# Patient Record
Sex: FEMALE | Race: WHITE | NOT HISPANIC OR LATINO | Employment: FULL TIME | URBAN - METROPOLITAN AREA
[De-identification: names, ages, dates, MRNs, and addresses within clinical notes are randomized per-mention and may not be internally consistent; named-entity substitution may affect disease eponyms.]

---

## 2023-08-29 ENCOUNTER — HOSPITAL ENCOUNTER (OUTPATIENT)
Dept: DATA CONVERSION | Facility: HOSPITAL | Age: 58
Discharge: HOME | End: 2023-08-29
Payer: MEDICARE

## 2023-08-29 DIAGNOSIS — B34.9 VIRAL INFECTION, UNSPECIFIED: ICD-10-CM

## 2023-08-29 DIAGNOSIS — G93.31 POSTVIRAL FATIGUE SYNDROME: ICD-10-CM

## 2023-08-29 LAB
ALBUMIN SERPL-MCNC: 3.7 GM/DL (ref 3.5–5)
ALBUMIN/GLOB SERPL: 1.2 RATIO (ref 1.5–3)
ALP BLD-CCNC: 97 U/L (ref 35–125)
ALT SERPL-CCNC: 28 U/L (ref 5–40)
ANION GAP SERPL CALCULATED.3IONS-SCNC: 13 MMOL/L (ref 0–19)
AST SERPL-CCNC: 31 U/L (ref 5–40)
BASOPHILS # BLD AUTO: 0.02 K/UL (ref 0–0.22)
BASOPHILS NFR BLD AUTO: 0.3 % (ref 0–1)
BILIRUB SERPL-MCNC: 0.2 MG/DL (ref 0.1–1.2)
BUN SERPL-MCNC: 24 MG/DL (ref 8–25)
BUN/CREAT SERPL: 24 RATIO (ref 8–21)
CALCIUM SERPL-MCNC: 9.5 MG/DL (ref 8.5–10.4)
CHLORIDE SERPL-SCNC: 105 MMOL/L (ref 97–107)
CO2 SERPL-SCNC: 25 MMOL/L (ref 24–31)
CREAT SERPL-MCNC: 1 MG/DL (ref 0.4–1.6)
DEPRECATED RDW RBC AUTO: 47.5 FL (ref 37–54)
DIFFERENTIAL METHOD BLD: ABNORMAL
EOSINOPHIL # BLD AUTO: 0.03 K/UL (ref 0–0.45)
EOSINOPHIL NFR BLD: 0.5 % (ref 0–3)
ERYTHROCYTE [DISTWIDTH] IN BLOOD BY AUTOMATED COUNT: 13.4 % (ref 11.7–15)
GFR SERPL CREATININE-BSD FRML MDRD: 66 ML/MIN/1.73 M2
GLOBULIN SER-MCNC: 3.1 G/DL (ref 1.9–3.7)
GLUCOSE SERPL-MCNC: 115 MG/DL (ref 65–99)
HCT VFR BLD AUTO: 38.8 % (ref 36–44)
HGB BLD-MCNC: 12.8 GM/DL (ref 12–15)
IMM GRANULOCYTES # BLD AUTO: 0.02 K/UL (ref 0–0.1)
LYMPHOCYTES # BLD AUTO: 0.93 K/UL (ref 1.2–3.2)
LYMPHOCYTES NFR BLD MANUAL: 14.8 % (ref 20–40)
MCH RBC QN AUTO: 31.5 PG (ref 26–34)
MCHC RBC AUTO-ENTMCNC: 33 % (ref 31–37)
MCV RBC AUTO: 95.6 FL (ref 80–100)
MONOCYTES # BLD AUTO: 0.67 K/UL (ref 0–0.8)
MONOCYTES NFR BLD MANUAL: 10.7 % (ref 0–8)
NEUTROPHILS # BLD AUTO: 4.62 K/UL
NEUTROPHILS # BLD AUTO: 4.62 K/UL (ref 1.8–7.7)
NEUTROPHILS.IMMATURE NFR BLD: 0.3 % (ref 0–1)
NEUTS SEG NFR BLD: 73.4 % (ref 50–70)
NRBC BLD-RTO: 0 /100 WBC
PLATELET # BLD AUTO: 262 K/UL (ref 150–450)
PMV BLD AUTO: 10 CU (ref 7–12.6)
POTASSIUM SERPL-SCNC: 4.6 MMOL/L (ref 3.4–5.1)
PROT SERPL-MCNC: 6.8 G/DL (ref 5.9–7.9)
RBC # BLD AUTO: 4.06 M/UL (ref 4–4.9)
SODIUM SERPL-SCNC: 143 MMOL/L (ref 133–145)
TSH SERPL DL<=0.05 MIU/L-ACNC: 0.89 MIU/L (ref 0.27–4.2)
WBC # BLD AUTO: 6.3 K/UL (ref 4.5–11)

## 2023-09-13 ENCOUNTER — HOSPITAL ENCOUNTER (OUTPATIENT)
Dept: DATA CONVERSION | Facility: HOSPITAL | Age: 58
Discharge: HOME | End: 2023-09-13
Payer: MEDICARE

## 2023-09-13 DIAGNOSIS — M70.62 TROCHANTERIC BURSITIS, LEFT HIP: ICD-10-CM

## 2023-10-09 DIAGNOSIS — G89.29 OTHER CHRONIC PAIN: Primary | ICD-10-CM

## 2023-10-09 RX ORDER — LEVOCETIRIZINE DIHYDROCHLORIDE 5 MG/1
TABLET, FILM COATED ORAL EVERY 24 HOURS
COMMUNITY

## 2023-10-09 RX ORDER — OXYCODONE AND ACETAMINOPHEN 5; 325 MG/1; MG/1
TABLET ORAL
COMMUNITY
Start: 2023-01-10 | End: 2024-04-29 | Stop reason: ALTCHOICE

## 2023-10-09 RX ORDER — BUPROPION HYDROCHLORIDE 150 MG/1
TABLET ORAL
COMMUNITY
End: 2024-04-16

## 2023-10-09 RX ORDER — AMOXICILLIN AND CLAVULANATE POTASSIUM 875; 125 MG/1; MG/1
1 TABLET, FILM COATED ORAL 2 TIMES DAILY
COMMUNITY
Start: 2022-11-25 | End: 2022-12-05

## 2023-10-09 RX ORDER — TRIAMCINOLONE ACETONIDE 0.25 MG/G
CREAM TOPICAL
COMMUNITY
Start: 2023-06-07 | End: 2024-04-29

## 2023-10-09 RX ORDER — BUPROPION HYDROCHLORIDE 300 MG/1
TABLET ORAL
COMMUNITY
End: 2024-04-16

## 2023-10-09 RX ORDER — ACETAMINOPHEN 325 MG/1
TABLET ORAL EVERY 4 HOURS
COMMUNITY

## 2023-10-09 RX ORDER — DOXYCYCLINE 100 MG/1
100 CAPSULE ORAL 2 TIMES DAILY
COMMUNITY
Start: 2023-01-10 | End: 2023-01-17

## 2023-10-09 RX ORDER — DULOXETIN HYDROCHLORIDE 60 MG/1
60 CAPSULE, DELAYED RELEASE ORAL DAILY
COMMUNITY
End: 2024-04-16

## 2023-10-09 RX ORDER — MOMETASONE FUROATE AND FORMOTEROL FUMARATE DIHYDRATE 100; 5 UG/1; UG/1
AEROSOL RESPIRATORY (INHALATION) EVERY 12 HOURS
COMMUNITY
End: 2024-04-29 | Stop reason: ALTCHOICE

## 2023-10-09 RX ORDER — MECLIZINE HCL 12.5 MG 12.5 MG/1
TABLET ORAL
COMMUNITY
End: 2024-04-29 | Stop reason: ALTCHOICE

## 2023-10-09 RX ORDER — ACETAMINOPHEN, ASPIRIN (NSAID), AND CAFFEINE 250; 250; 65 MG/1; MG/1; MG/1
TABLET, FILM COATED ORAL EVERY 6 HOURS
COMMUNITY

## 2023-10-09 RX ORDER — CELECOXIB 200 MG/1
CAPSULE ORAL
COMMUNITY
End: 2024-01-02 | Stop reason: SDUPTHER

## 2023-10-09 RX ORDER — SULFAMETHOXAZOLE AND TRIMETHOPRIM 800; 160 MG/1; MG/1
TABLET ORAL
COMMUNITY
End: 2024-04-29 | Stop reason: ALTCHOICE

## 2023-10-09 RX ORDER — ELECTROLYTES/DEXTROSE
SOLUTION, ORAL ORAL EVERY 24 HOURS
COMMUNITY

## 2023-10-09 RX ORDER — FLUTICASONE PROPIONATE 50 MCG
SPRAY, SUSPENSION (ML) NASAL
COMMUNITY
Start: 2022-11-25 | End: 2024-04-29 | Stop reason: ALTCHOICE

## 2023-10-09 RX ORDER — ALBUTEROL SULFATE 90 UG/1
AEROSOL, METERED RESPIRATORY (INHALATION)
COMMUNITY
Start: 2013-07-23

## 2023-10-09 RX ORDER — BUDESONIDE AND FORMOTEROL FUMARATE DIHYDRATE 160; 4.5 UG/1; UG/1
2 AEROSOL RESPIRATORY (INHALATION) 2 TIMES DAILY
COMMUNITY
Start: 2013-10-14 | End: 2024-04-29 | Stop reason: ALTCHOICE

## 2023-10-09 RX ORDER — CELECOXIB 200 MG/1
200 CAPSULE ORAL DAILY
Qty: 30 CAPSULE | Refills: 0 | OUTPATIENT
Start: 2023-10-09

## 2023-10-09 RX ORDER — ONDANSETRON 4 MG/1
TABLET, FILM COATED ORAL
COMMUNITY
Start: 2023-08-30 | End: 2024-04-29 | Stop reason: ALTCHOICE

## 2023-10-09 RX ORDER — PREDNISONE 10 MG/1
TABLET ORAL
COMMUNITY
End: 2024-04-29 | Stop reason: ALTCHOICE

## 2023-10-10 RX ORDER — CELECOXIB 200 MG/1
200 CAPSULE ORAL DAILY
Qty: 30 CAPSULE | Refills: 2 | Status: SHIPPED | OUTPATIENT
Start: 2023-10-10 | End: 2023-11-09

## 2023-11-02 PROBLEM — E21.3 HYPERPARATHYROIDISM (MULTI): Status: ACTIVE | Noted: 2023-11-02

## 2023-11-02 PROBLEM — N39.490 OVERFLOW INCONTINENCE: Status: ACTIVE | Noted: 2023-11-02

## 2023-11-02 PROBLEM — G54.2 CERVICAL NERVE ROOT IMPINGEMENT: Status: ACTIVE | Noted: 2023-11-02

## 2023-11-02 PROBLEM — G47.33 OBSTRUCTIVE SLEEP APNEA SYNDROME: Status: ACTIVE | Noted: 2023-11-02

## 2023-11-02 PROBLEM — K90.9 INTESTINAL MALABSORPTION (HHS-HCC): Status: ACTIVE | Noted: 2023-11-02

## 2023-11-02 PROBLEM — J45.20 MILD INTERMITTENT ASTHMA WITHOUT COMPLICATION (HHS-HCC): Status: ACTIVE | Noted: 2023-11-02

## 2023-11-02 PROBLEM — E55.9 VITAMIN D DEFICIENCY: Status: ACTIVE | Noted: 2023-11-02

## 2023-11-02 PROBLEM — G43.109 OCULAR MIGRAINE: Status: ACTIVE | Noted: 2023-11-02

## 2023-11-02 PROBLEM — M19.012 ARTHRITIS OF LEFT ACROMIOCLAVICULAR JOINT: Status: ACTIVE | Noted: 2023-11-02

## 2023-11-02 PROBLEM — G89.29 OTHER CHRONIC PAIN: Status: ACTIVE | Noted: 2023-11-02

## 2023-11-02 PROBLEM — F41.8 DEPRESSION WITH ANXIETY: Status: ACTIVE | Noted: 2023-11-02

## 2023-11-02 PROBLEM — G44.89 OTHER HEADACHE SYNDROME: Status: ACTIVE | Noted: 2023-11-02

## 2023-11-02 PROBLEM — M50.30 DDD (DEGENERATIVE DISC DISEASE), CERVICAL: Status: ACTIVE | Noted: 2023-11-02

## 2023-11-02 PROBLEM — J45.30 MILD PERSISTENT ASTHMA WITHOUT COMPLICATION (HHS-HCC): Status: ACTIVE | Noted: 2023-11-02

## 2023-11-02 PROBLEM — F43.21 GRIEF: Status: ACTIVE | Noted: 2023-11-02

## 2023-11-02 PROBLEM — N39.3 STRESS INCONTINENCE (FEMALE) (MALE): Status: ACTIVE | Noted: 2023-11-02

## 2023-11-02 PROBLEM — J45.909 ASTHMA (HHS-HCC): Status: ACTIVE | Noted: 2023-11-02

## 2023-11-02 PROBLEM — E66.01 MORBID (SEVERE) OBESITY DUE TO EXCESS CALORIES (MULTI): Status: ACTIVE | Noted: 2023-11-02

## 2023-11-02 PROBLEM — M47.812 SPONDYLOSIS WITHOUT MYELOPATHY OR RADICULOPATHY, CERVICAL REGION: Status: ACTIVE | Noted: 2023-11-02

## 2023-11-02 PROBLEM — J31.0 CHRONIC RHINITIS: Status: ACTIVE | Noted: 2023-11-02

## 2023-11-02 PROBLEM — H93.13 TINNITUS OF BOTH EARS: Status: ACTIVE | Noted: 2023-11-02

## 2023-11-02 PROBLEM — M19.011 PRIMARY OSTEOARTHRITIS, RIGHT SHOULDER: Status: ACTIVE | Noted: 2023-11-02

## 2023-11-02 RX ORDER — CALCIUM CITRATE/VITAMIN D3 500MG-12.5
1 TABLET,CHEWABLE ORAL 2 TIMES DAILY
COMMUNITY

## 2023-11-02 RX ORDER — PNV NO.95/FERROUS FUM/FOLIC AC 28MG-0.8MG
1 TABLET ORAL DAILY
COMMUNITY

## 2023-11-02 RX ORDER — ASCORBIC ACID 125 MG
1 TABLET,CHEWABLE ORAL DAILY
COMMUNITY
End: 2024-04-29 | Stop reason: ALTCHOICE

## 2023-11-02 RX ORDER — ONDANSETRON 4 MG/1
1 TABLET, FILM COATED ORAL EVERY 4 HOURS PRN
COMMUNITY
Start: 2023-08-30 | End: 2024-04-29 | Stop reason: ALTCHOICE

## 2023-11-06 ENCOUNTER — ANCILLARY PROCEDURE (OUTPATIENT)
Dept: RADIOLOGY | Facility: CLINIC | Age: 58
End: 2023-11-06
Payer: MEDICARE

## 2023-11-06 ENCOUNTER — OFFICE VISIT (OUTPATIENT)
Dept: ORTHOPEDIC SURGERY | Facility: CLINIC | Age: 58
End: 2023-11-06
Payer: MEDICARE

## 2023-11-06 DIAGNOSIS — M25.512 LEFT SHOULDER PAIN, UNSPECIFIED CHRONICITY: ICD-10-CM

## 2023-11-06 DIAGNOSIS — M25.812 IMPINGEMENT OF LEFT SHOULDER: Primary | ICD-10-CM

## 2023-11-06 PROCEDURE — 73030 X-RAY EXAM OF SHOULDER: CPT | Mod: LT,FY

## 2023-11-06 PROCEDURE — 99213 OFFICE O/P EST LOW 20 MIN: CPT | Performed by: ORTHOPAEDIC SURGERY

## 2023-11-06 PROCEDURE — 20611 DRAIN/INJ JOINT/BURSA W/US: CPT | Performed by: ORTHOPAEDIC SURGERY

## 2023-11-06 PROCEDURE — 1036F TOBACCO NON-USER: CPT | Performed by: ORTHOPAEDIC SURGERY

## 2023-11-06 RX ORDER — TRIAMCINOLONE ACET/0.9%NACL/PF 40 MG/ML
40 VIAL (ML) INJECTION ONCE
Status: COMPLETED | OUTPATIENT
Start: 2023-11-06 | End: 2023-11-06

## 2023-11-06 RX ADMIN — Medication 40 MG: at 11:16

## 2023-11-06 ASSESSMENT — ENCOUNTER SYMPTOMS
FEVER: 0
ARTHRALGIAS: 1
SHORTNESS OF BREATH: 0
WHEEZING: 0
FATIGUE: 0
CHILLS: 0

## 2023-11-06 ASSESSMENT — PAIN SCALES - GENERAL: PAINLEVEL_OUTOF10: 7

## 2023-11-06 ASSESSMENT — PAIN - FUNCTIONAL ASSESSMENT: PAIN_FUNCTIONAL_ASSESSMENT: 0-10

## 2023-11-06 NOTE — PROGRESS NOTES
Reason for Appointment  Chief Complaint   Patient presents with    Left Shoulder - Follow-up       History of Present Illness  Patient is a 58 y.o. female here today for evaluation of new left shoulder pain. She is about 2 months s/p a right subacromial injection which has provided excellent relief. X-rays today do not show any severe DJD of the shoulder joint, there is some mild AC joint DJD. She complains of similar symptoms in the left shoulder that are worse with lifting and better with rest. The pain is anterior and anterior lateral. She has continued to work as a .     History reviewed. No pertinent past medical history.    History reviewed. No pertinent surgical history.    Medication Documentation Review Audit       Reviewed by Deborah Stein MA (Medical Assistant) on 11/06/23 at 0807      Medication Order Taking? Sig Documenting Provider Last Dose Status   acetaminophen (TylenoL) 325 mg tablet [id number]  every 4 hours. Historical Provider, MD  Active   albuterol (Ventolin HFA) 90 mcg/actuation inhaler [id number]  Inhale. Historical Provider, MD  Active   aspirin-acetaminophen-caffeine (Excedrin Extra Strength) 250-250-65 mg tablet [id number]  every 6 hours. Historical Provider, MD  Active   biotin 5 mg capsule [id number]  once every 24 hours. Historical Provider, MD  Active   budesonide-formoteroL (Symbicort) 160-4.5 mcg/actuation inhaler [id number]  Inhale 2 puffs 2 times a day. Historical Provider, MD  Active   buPROPion XL (Wellbutrin XL) 150 mg 24 hr tablet [id number]  TAKE ONE TABLET BY MOUTH DAILY IN THE MORNING ONCE A DAY WITH A 300 MG TABLET Historical Provider, MD  Active   buPROPion XL (Wellbutrin XL) 300 mg 24 hr tablet [id number]  TAKE ONE TABLET BY MOUTH DAILY IN THE MORNING WITH  MG DOSE Historical Provider, MD  Active   calcium citrate-vitamin D3 500 mg-12.5 mcg (500 unit) tablet,chewable [id number]  Chew 1 tablet 2 times a day. Historical Provider, MD  Active   celecoxib  (CeleBREX) 200 mg capsule [id number]  take one capsule by mouth once daily with food for 30 Historical Provider, MD  Active   celecoxib (CeleBREX) 200 mg capsule [id number]  Take 1 capsule (200 mg) by mouth once daily. Joel Jones MD  Active   cholecalciferol, vitamin D3, 25 mcg (1,000 unit) tablet,chewable [id number]  Chew 1 tablet (25 mcg) once daily. Historical Provider, MD  Active   cyanocobalamin (Vitamin B-12) 100 mcg tablet [id number]  Take 1 tablet (100 mcg) by mouth once daily. Historical Provider, MD  Active   Dulera 100-5 mcg/actuation inhaler [id number]  every 12 hours. Historical Provider, MD  Active   DULoxetine (Cymbalta) 60 mg DR capsule [id number]  Take 1 capsule (60 mg) by mouth once daily. Historical Provider, MD  Active   fluticasone (Flonase) 50 mcg/actuation nasal spray [id number]  INSTILL 1 SPRAY INTO EACH NOSTRIL DAILY FOR 14 DAYS Historical Provider, MD  Active   levocetirizine (Xyzal) 5 mg tablet [id number]  once every 24 hours. Historical Provider, MD  Active   meclizine (Antivert) 12.5 mg tablet [id number]  TAKE 1 TO 2 TABLETS BY MOUTH 3 TIMES A DAY for 10 Historical Provider, MD  Active   multivit-minerals/folic acid (ADULT ONE DAILY MULTIVITAMIN ORAL) [id number]  Take by mouth. As directed Historical Provider, MD  Active   ondansetron (Zofran) 4 mg tablet [id number]  TAKE ONE TABLET BY MOUTH EVERY 4 HOURS AS NEEDED FOR NAUSEA/VOMITING/HEADACHE Historical Provider, MD  Active   ondansetron (Zofran) 4 mg tablet [id number]  Take 1 tablet (4 mg) by mouth every 4 hours if needed.  N/V/HA for 30 days Historical Provider, MD  Active   oxyCODONE-acetaminophen (Percocet) 5-325 mg tablet [id number]  TAKE ONE TABLET BY MOUTH EVERY 6 HOURS AS NEEDED FOR PAIN FOR 7 DAYS Historical Provider, MD  Active   predniSONE (Deltasone) 10 mg tablet [id number]  TAKE 4 TABLETS (40 MG) BY MOUTH DAILY FOR 4 DAYS  THEN TAKE 2 TABLETS (20 MG) DAILY FOR 4 DAYS  THEN TAKE 1 TABLET (10 MG) DAILY FOR 4 DAYS. Historical  Provider, MD  Active   sulfamethoxazole-trimethoprim (Bactrim DS) 800-160 mg tablet [id number]  TAKE ONE TABLET BY MOUTH TWO TIMES A DAY FOR 3 DAYS Historical Provider, MD  Active   triamcinolone (Kenalog) 0.025 % cream [id number]  APPLY TOPICALLY 2-4 TIMES DAILY. DO NOT APPLY TO FACE/INTERTRIGINOUS (ARMPIT OR GROIN) AREAS. Historical Provider, MD  Active                    Allergies   Allergen Reactions    House Dust Mite Other     congested, stuffy    Mold Other     congested, stuffy       Review of Systems   Constitutional:  Negative for chills, fatigue and fever.   Respiratory:  Negative for shortness of breath and wheezing.    Cardiovascular:  Negative for chest pain and leg swelling.   Musculoskeletal:  Positive for arthralgias.   All other systems reviewed and are negative.      Exam   On exam, the left shoulder shows good ROM and excellent cuff strength, markedly positive impingement sign, no significant biceps tenderness, good pulses, good sensation, good deltoid function    Assessment   Encounter Diagnosis   Name Primary?    Left shoulder pain, unspecified chronicity        Plan   The injection to the right shoulder provided excellent relief. Today we discussed conservative treatment. At this point, the patient is experiencing increased left shoulder pain that is consistent with impingement syndrome on clinical examination with positive impingement signs. We will do one cortisone injection into the left subacromial space in hopes to calm their symptoms nicely. Pt understands the small risk of infection and warning signs including flare reaction.     Procedures  After discussing the risks and benefits of the procedure with proceeded with an injection.  Using ultrasound guidance we identified the acromion, humeral head and the subacromial bursa, images obtained. We then sterilely injected the left subacrominal space with a mixture of 40 mg of Kenalog and 1 cc of 1 % lidocaine. Pt tolerated the procedure well  without any adverse reactions.    I, Sita Muniz, attest that this documentation has been prepared under the direction and in the presence of Armani Vegas MD. By signing below, I, Armani Vegas MD, personally performed the services described in this documentation. All medical record entries made by the scribe were at my direction and in my presence. I have reviewed the chart and agree that the record reflects my personal performance and is accurate and complete. 11/06/23

## 2023-12-05 ENCOUNTER — TELEPHONE (OUTPATIENT)
Dept: PAIN MEDICINE | Facility: CLINIC | Age: 58
End: 2023-12-05
Payer: MEDICARE

## 2023-12-05 NOTE — TELEPHONE ENCOUNTER
Pt lm, asking for celebrex refill and another procedure. I didn't see any recent appts for her. I called her back to schedule a follow up/ ended up leaving her a message stating she needs a follow up appt to discuss med refills and procedure. kb

## 2024-01-01 NOTE — PROGRESS NOTES
[facility]  Follow Up Office Visit Note 2024    Patient Information: Arin Phan, MRN: [id number], : 1965   Primary Care/Referring Physician: Alejandrina Hodge MD, [address] [facility] [address] / [facility] [address]     Chief Complaint: Neck and shoulder pain  History of Pain: Ms. Arin Phan is a 58 y.o. female with a PMHx of asthma, GAYE, anxiety, depression who presents for left sided neck and shoulder pain.    She describes primarily axial neck pain, worse on the left, which worsens when extending her head, rotating her head to the left.  This pain radiates down to her left shoulder but not further down the arm.  She does describe intermittent tingling in her hands.  She has undergone a C6/7 MARK with Dr. Jones in the past with over 60% pain relief for 3-4 months. She has also been utilizing Celecoxib daily and ran out a few months ago with significant worsening of her pain.     She also describes pain in other areas of her body, including her left shoulder, left hip, and bilateral knees. Was last seen by Dr. Vegas on 23 who performed a left subacromial bursa injection. She is also seeing Dr. Prescott for hip and knee pain (has had injections in both knees and her left hip). Of note, she is planning to undergo surgery on her right foot on 24.     Current Pain Medications: OTC Tylenol, Celecoxib 200 mg daily, Duloxetine 60 mg daily,   Previously Tried Pain Medications:    Relevant Surgeries: Denies spine or major orthopedic surgery.   Injections: Underwent bilateral subacromial bursa injections , left hip injection, bilateral knee injections, C6/7 MARK  Physical/Occupational Therapy: Has done PT for this pain in the past    Medications:   Current Outpatient Medications   Medication Instructions    acetaminophen (TylenoL) 325 mg tablet Every 4 hours    albuterol (Ventolin HFA) 90 mcg/actuation inhaler inhalation    aspirin-acetaminophen-caffeine (Excedrin Extra Strength) 250-250-65 mg  tablet Every 6 hours    biotin 5 mg capsule Every 24 hours    budesonide-formoteroL (Symbicort) 160-4.5 mcg/actuation inhaler 2 puffs, inhalation, 2 times daily    buPROPion XL (Wellbutrin XL) 150 mg 24 hr tablet TAKE ONE TABLET BY MOUTH DAILY IN THE MORNING ONCE A DAY WITH A 300 MG TABLET    buPROPion XL (Wellbutrin XL) 300 mg 24 hr tablet TAKE ONE TABLET BY MOUTH DAILY IN THE MORNING WITH  MG DOSE    calcium citrate-vitamin D3 500 mg-12.5 mcg (500 unit) tablet,chewable 1 tablet, oral, 2 times daily    celecoxib (CELEBREX) 200 mg, oral, Daily    cholecalciferol, vitamin D3, 25 mcg (1,000 unit) tablet,chewable 1 tablet, oral, Daily    cyanocobalamin (Vitamin B-12) 100 mcg tablet 1 tablet, oral, Daily    Dulera 100-5 mcg/actuation inhaler Every 12 hours    DULoxetine (CYMBALTA) 60 mg, oral, Daily    fluticasone (Flonase) 50 mcg/actuation nasal spray INSTILL 1 SPRAY INTO EACH NOSTRIL DAILY FOR 14 DAYS    levocetirizine (Xyzal) 5 mg tablet Every 24 hours    meclizine (Antivert) 12.5 mg tablet TAKE 1 TO 2 TABLETS BY MOUTH 3 TIMES A DAY for 10    multivit-minerals/folic acid (ADULT ONE DAILY MULTIVITAMIN ORAL) oral, As directed    ondansetron (Zofran) 4 mg tablet TAKE ONE TABLET BY MOUTH EVERY 4 HOURS AS NEEDED FOR NAUSEA/VOMITING/HEADACHE    ondansetron (Zofran) 4 mg tablet 1 tablet, oral, Every 4 hours PRN,  N/V/HA for 30 days<BR>    oxyCODONE-acetaminophen (Percocet) 5-325 mg tablet TAKE ONE TABLET BY MOUTH EVERY 6 HOURS AS NEEDED FOR PAIN FOR 7 DAYS    predniSONE (Deltasone) 10 mg tablet TAKE 4 TABLETS (40 MG) BY MOUTH DAILY FOR 4 DAYS  THEN TAKE 2 TABLETS (20 MG) DAILY FOR 4 DAYS  THEN TAKE 1 TABLET (10 MG) DAILY FOR 4 DAYS.    sulfamethoxazole-trimethoprim (Bactrim DS) 800-160 mg tablet TAKE ONE TABLET BY MOUTH TWO TIMES A DAY FOR 3 DAYS    triamcinolone (Kenalog) 0.025 % cream APPLY TOPICALLY 2-4 TIMES DAILY. DO NOT APPLY TO FACE/INTERTRIGINOUS (ARMPIT OR GROIN) AREAS.      Allergies:   Allergies   Allergen  Reactions    House Dust Mite Other     congested, stuffy    Mold Other     congested, stuffy       Past Medical & Surgical History:  History reviewed. No pertinent past medical history. History reviewed. No pertinent surgical history.    Family History   Problem Relation Name Age of Onset    Heart disease Mother 81     Coronary artery disease Mother 81     Other (s/p cabg) Mother 81     Hypertension Father 81     Obesity Father 81     Atrial fibrillation Father 81     Heart failure Father 81     Other (CRF) Father 81     Other (CVA) Brother 59      Social History     Socioeconomic History    Marital status: Single     Spouse name: Not on file    Number of children: Not on file    Years of education: Not on file    Highest education level: Not on file   Occupational History    Not on file   Tobacco Use    Smoking status: Never    Smokeless tobacco: Never   Substance and Sexual Activity    Alcohol use: Never    Drug use: Never    Sexual activity: Not on file   Other Topics Concern    Not on file   Social History Narrative    Not on file     Social Determinants of Health     Financial Resource Strain: Not on file   Food Insecurity: Not on file   Transportation Needs: Not on file   Physical Activity: Not on file   Stress: Not on file   Social Connections: Not on file   Intimate Partner Violence: Not on file   Housing Stability: Not on file       Problems, Past medical history, past surgical history, Medications, allergies, social and family history reviewed and as per the electronic medical record from today's encounter    Review of Systems:  CONST: No fever, chills, fatigue, weight changes  EYES: No loss of vision  ENT: No hearing loss, tinnitus  CV: No chest pain, palpitations  RESP: No dyspnea, shortness of breath, cough  GI: No stool incontinence, nausea, vomiting  : No urinary incontinence  MSK: No joint swelling  SKIN: No rash, no hives  NEURO: No headache, dizziness, weakness  PSYCH: Hx of anxiety and  "depression  HEM/LYMPH: No easy bruising or bleeding  All other systems reviewed are negative     Physical Exam:  Vitals: /80   Pulse 81   Resp 18   Ht 1.626 m (5' 4\")   Wt 109 kg (240 lb)   BMI 41.20 kg/m²   General: No apparent distress. Alert, appropriate, oriented x 3. Mood and affect normal. Speaking in full sentences.  HENT: Normocephalic, atraumatic. Hearing intact.  Eyes: Extraocular movements grossly intact. Pupils equal and round.   Neck: Supple, trachea midline.  Lungs: Symmetric respiratory excursion on visual exam, nonlabored breathing.  Extremities: No clubbing, cyanosis, or edema noted in arms or legs.  Skin: No rashes, lesions, alopecia noted on back or extremities.   Neck: Reports pain with extension, leftward rotation. Spurling's maneuver causes axial pain bilaterally but no radiation of pain down to her hands. Reports tenderness to palpation of her bilateral cervical paraspinal muscles, bilateral trapezius muscles.  Neuro: Alert and appropriate. Gait within normal limits. Bulk and tone within normal limits.    Laboratory Data:  The following laboratory data were reviewed during this visit:   Lab Results   Component Value Date    WBC 6.3 08/29/2023    RBC 4.06 08/29/2023    HGB 12.8 08/29/2023    HCT 38.8 08/29/2023     08/29/2023      No results found for: \"INR\"  Lab Results   Component Value Date    CREATININE 1.0 08/29/2023    HGBA1C 5.7 03/28/2018       Imaging:  The following imaging impressions were reviewed by me during this visit:    -1/17/22 cervical spine MRI shows C5-6 circumferential disc bulge at this level, mild facet arthrosis and uncovertebral joint spurring bilaterally resulting in bilateral foraminal stenosis, more severe on the right than on the left.  -11/6/23 left shoulder xray shows mild degenerative narrowing and spurring of the acromioclavicular joint space. The glenohumeral joint space overall appears maintained.      I also personally reviewed the images " from the above studies myself. These images and my interpretation of them contributed to the management and decision making of the patient's medical plan.    ASSESSMENT:  Ms. Arin Phan is a 58 y.o. female with left neck and shoulder pain that is consistent with:    1. Cervical radiculopathy    2. Impingement of left shoulder    3. Arthropathy of cervical facet joint        PLAN:    Diagnostics:   -I reviewed her most recent cervical spine MRI (see above for details).  No new diagnostics at this time    Physical Therapy and Rehabilitation:     -Completed physical therapy for her pain in the past, without durable improvement.  She should maintain her current home exercise program    Psychologically:  -No needs at this time    Medications  -She reports significant improvement in pain from celecoxib 200 mg daily.  Will continue this medication. Refills provided today    Duration  - Multiple years    Interventions:  -I suspect her neck and shoulder pain is multifactorial in the setting of cervical radiculopathy, cervical facet arthropathy, and left shoulder impingement.  She has previously undergone a C6/7 MARK with 60% pain relief for 3 to 4 months.  Her neck pain is back to baseline and she is interested in repeating this procedure utilizing live fluoroscopy and local anesthesia.  I would also consider to a left-sided C4/5, C5/6 mbb/RF ablation in the future as a means of providing pain relief while avoiding additional steroid use since she is getting steroid injections from multiple different providers.        Sincerely,  Efrem Lyn MD  [facility] [place]

## 2024-01-02 ENCOUNTER — OFFICE VISIT (OUTPATIENT)
Dept: PAIN MEDICINE | Facility: CLINIC | Age: 59
End: 2024-01-02
Payer: MEDICARE

## 2024-01-02 ENCOUNTER — APPOINTMENT (OUTPATIENT)
Dept: PAIN MEDICINE | Facility: CLINIC | Age: 59
End: 2024-01-02
Payer: MEDICARE

## 2024-01-02 VITALS
DIASTOLIC BLOOD PRESSURE: 80 MMHG | WEIGHT: 240 LBS | SYSTOLIC BLOOD PRESSURE: 135 MMHG | HEART RATE: 81 BPM | HEIGHT: 64 IN | BODY MASS INDEX: 40.97 KG/M2 | RESPIRATION RATE: 18 BRPM

## 2024-01-02 DIAGNOSIS — M47.812 ARTHROPATHY OF CERVICAL FACET JOINT: ICD-10-CM

## 2024-01-02 DIAGNOSIS — M25.812 IMPINGEMENT OF LEFT SHOULDER: ICD-10-CM

## 2024-01-02 DIAGNOSIS — M54.12 CERVICAL RADICULOPATHY: Primary | ICD-10-CM

## 2024-01-02 PROBLEM — E66.9 OBESITY, UNSPECIFIED: Status: ACTIVE | Noted: 2024-01-02

## 2024-01-02 PROCEDURE — 99214 OFFICE O/P EST MOD 30 MIN: CPT | Performed by: STUDENT IN AN ORGANIZED HEALTH CARE EDUCATION/TRAINING PROGRAM

## 2024-01-02 PROCEDURE — 1036F TOBACCO NON-USER: CPT | Performed by: STUDENT IN AN ORGANIZED HEALTH CARE EDUCATION/TRAINING PROGRAM

## 2024-01-02 RX ORDER — CELECOXIB 200 MG/1
200 CAPSULE ORAL DAILY
Qty: 30 CAPSULE | Refills: 2 | Status: SHIPPED | OUTPATIENT
Start: 2024-01-02 | End: 2024-04-16

## 2024-01-02 ASSESSMENT — PATIENT HEALTH QUESTIONNAIRE - PHQ9
SUM OF ALL RESPONSES TO PHQ9 QUESTIONS 1 AND 2: 1
10. IF YOU CHECKED OFF ANY PROBLEMS, HOW DIFFICULT HAVE THESE PROBLEMS MADE IT FOR YOU TO DO YOUR WORK, TAKE CARE OF THINGS AT HOME, OR GET ALONG WITH OTHER PEOPLE: SOMEWHAT DIFFICULT
2. FEELING DOWN, DEPRESSED OR HOPELESS: SEVERAL DAYS
1. LITTLE INTEREST OR PLEASURE IN DOING THINGS: NOT AT ALL

## 2024-01-02 ASSESSMENT — PAIN SCALES - GENERAL
PAINLEVEL: 4
PAINLEVEL_OUTOF10: 4

## 2024-01-02 ASSESSMENT — PAIN DESCRIPTION - DESCRIPTORS: DESCRIPTORS: TIGHTNESS

## 2024-01-02 ASSESSMENT — PAIN - FUNCTIONAL ASSESSMENT: PAIN_FUNCTIONAL_ASSESSMENT: 0-10

## 2024-01-08 ENCOUNTER — DOCUMENTATION (OUTPATIENT)
Dept: PHYSICAL THERAPY | Facility: CLINIC | Age: 59
End: 2024-01-08
Payer: MEDICARE

## 2024-01-08 ENCOUNTER — TELEPHONE (OUTPATIENT)
Dept: PAIN MEDICINE | Facility: CLINIC | Age: 59
End: 2024-01-08
Payer: MEDICARE

## 2024-01-08 NOTE — TELEPHONE ENCOUNTER
Patient left message  states celebrex requires prior authorization. I did call back patient to confirm her insurance is still [facility]

## 2024-01-16 ENCOUNTER — TELEPHONE (OUTPATIENT)
Dept: PAIN MEDICINE | Facility: CLINIC | Age: 59
End: 2024-01-16
Payer: MEDICARE

## 2024-01-16 ENCOUNTER — APPOINTMENT (OUTPATIENT)
Dept: PRIMARY CARE | Facility: CLINIC | Age: 59
End: 2024-01-16
Payer: MEDICARE

## 2024-02-07 ENCOUNTER — HOSPITAL ENCOUNTER (OUTPATIENT)
Dept: RADIOLOGY | Facility: CLINIC | Age: 59
Discharge: HOME | End: 2024-02-07
Payer: MEDICARE

## 2024-02-07 DIAGNOSIS — M21.611 BUNION OF RIGHT FOOT: ICD-10-CM

## 2024-02-12 NOTE — TELEPHONE ENCOUNTER
SPOKE TO PATIENT SHE APOLOGIZED BECAUSE SHE DID NOT ASK THE FOOT SURGEON IF SHE COULD HAVE THE MARK. SHE IS GOING TO ASK AT HER FOLLOW UP as THE SURGEON IS NOW ON VACATION.

## 2024-03-12 ENCOUNTER — APPOINTMENT (OUTPATIENT)
Dept: PRIMARY CARE | Facility: CLINIC | Age: 59
End: 2024-03-12
Payer: MEDICARE

## 2024-04-10 NOTE — PROGRESS NOTES
[facility]  Follow Up Office Visit Note 2024    Patient Information: Arin Phan, MRN: [id number], : 1965   Primary Care/Referring Physician: Alejandrina Hodge MD, [address] [facility] [address] / Formerly [facility] [id number]     Chief Complaint: Neck and shoulder pain    Interval History: Mrs. Phan presents today for follow up.    Today she reports no changes since last seen. We were planning to perform a C6/7 MARK but this was delayed due to her foot surgery. She has recovered from the surgery and would like to proceed with this injection. She continues to bilateral axial neck pain, worse on the left, with pain down both arms as well as headaches (prominent above her right eye)    She also has significant left lateral hip pain and can't sleep on that side. She has gotten a blind left GTB injection without much pain relief in the past, but feels that the injection wasn't done in the right spot.     When she first started Celecoxib felt it was helping, but is not sure it is providing much relief anymore.  She would like to stop this and return to taking over-the-counter NSAIDs    Brief History of Pain: Ms. Arin Phan is a 58 y.o. female with a PMHx of asthma, GAYE, anxiety, depression who presents for left sided neck and shoulder pain.    She describes primarily axial neck pain, worse on the left, which worsens when extending her head, rotating her head to the left.  This pain radiates down to her left shoulder but not further down the arm.  She does describe intermittent tingling in her hands.  She has undergone a C6/7 MARK with Dr. Jones in the past with over 60% pain relief for 3-4 months. She has also been utilizing Celecoxib daily and ran out a few months ago with significant worsening of her pain.     She also describes pain in other areas of her body, including her left shoulder, left hip, and bilateral knees. Was last seen by Dr. Vegas on 23 who performed a left subacromial bursa injection.  She is also seeing Dr. Prescott for hip and knee pain (has had injections in both knees and her left hip). Of note, she is planning to undergo surgery on her right foot on 2/7/24.     Current Pain Medications: OTC Tylenol, Celecoxib 200 mg daily, Duloxetine 60 mg daily,   Previously Tried Pain Medications:    Relevant Surgeries: Denies spine or major orthopedic surgery   Injections: Underwent bilateral subacromial bursa injections, left hip injection, bilateral knee injections, C6/7 MARK   Physical/Occupational Therapy: Has done PT for this pain in the past    Medications:   Current Outpatient Medications   Medication Instructions    acetaminophen (TylenoL) 325 mg tablet Every 4 hours    albuterol (Ventolin HFA) 90 mcg/actuation inhaler inhalation    aspirin-acetaminophen-caffeine (Excedrin Extra Strength) 250-250-65 mg tablet Every 6 hours    biotin 5 mg capsule Every 24 hours    budesonide-formoteroL (Symbicort) 160-4.5 mcg/actuation inhaler 2 puffs, inhalation, 2 times daily    buPROPion XL (Wellbutrin XL) 150 mg 24 hr tablet TAKE ONE TABLET BY MOUTH DAILY IN THE MORNING ONCE A DAY WITH A 300 MG TABLET    buPROPion XL (Wellbutrin XL) 300 mg 24 hr tablet TAKE ONE TABLET BY MOUTH DAILY IN THE MORNING WITH  MG DOSE    calcium citrate-vitamin D3 500 mg-12.5 mcg (500 unit) tablet,chewable 1 tablet, oral, 2 times daily    cholecalciferol, vitamin D3, 25 mcg (1,000 unit) tablet,chewable 1 tablet, oral, Daily    cyanocobalamin (Vitamin B-12) 100 mcg tablet 1 tablet, oral, Daily    Dulera 100-5 mcg/actuation inhaler Every 12 hours    DULoxetine (CYMBALTA) 60 mg, oral, Daily    fluticasone (Flonase) 50 mcg/actuation nasal spray INSTILL 1 SPRAY INTO EACH NOSTRIL DAILY FOR 14 DAYS    levocetirizine (Xyzal) 5 mg tablet Every 24 hours    meclizine (Antivert) 12.5 mg tablet TAKE 1 TO 2 TABLETS BY MOUTH 3 TIMES A DAY for 10    multivit-minerals/folic acid (ADULT ONE DAILY MULTIVITAMIN ORAL) oral, As directed    ondansetron  (Zofran) 4 mg tablet TAKE ONE TABLET BY MOUTH EVERY 4 HOURS AS NEEDED FOR NAUSEA/VOMITING/HEADACHE    ondansetron (Zofran) 4 mg tablet 1 tablet, oral, Every 4 hours PRN,  N/V/HA for 30 days<BR>    oxyCODONE-acetaminophen (Percocet) 5-325 mg tablet TAKE ONE TABLET BY MOUTH EVERY 6 HOURS AS NEEDED FOR PAIN FOR 7 DAYS    predniSONE (Deltasone) 10 mg tablet TAKE 4 TABLETS (40 MG) BY MOUTH DAILY FOR 4 DAYS  THEN TAKE 2 TABLETS (20 MG) DAILY FOR 4 DAYS  THEN TAKE 1 TABLET (10 MG) DAILY FOR 4 DAYS.    sulfamethoxazole-trimethoprim (Bactrim DS) 800-160 mg tablet TAKE ONE TABLET BY MOUTH TWO TIMES A DAY FOR 3 DAYS    triamcinolone (Kenalog) 0.025 % cream APPLY TOPICALLY 2-4 TIMES DAILY. DO NOT APPLY TO FACE/INTERTRIGINOUS (ARMPIT OR GROIN) AREAS.      Allergies:   Allergies   Allergen Reactions    House Dust Mite Other     congested, stuffy    Mold Other     congested, stuffy       Past Medical & Surgical History:  History reviewed. No pertinent past medical history. History reviewed. No pertinent surgical history.    Family History   Problem Relation Name Age of Onset    Heart disease Mother 81     Coronary artery disease Mother 81     Other (s/p cabg) Mother 81     Hypertension Father 81     Obesity Father 81     Atrial fibrillation Father 81     Heart failure Father 81     Other (CRF) Father 81     Other (CVA) Brother 59      Social History     Socioeconomic History    Marital status: Single     Spouse name: Not on file    Number of children: Not on file    Years of education: Not on file    Highest education level: Not on file   Occupational History    Not on file   Tobacco Use    Smoking status: Never    Smokeless tobacco: Never   Substance and Sexual Activity    Alcohol use: Never    Drug use: Never    Sexual activity: Not on file   Other Topics Concern    Not on file   Social History Narrative    Not on file     Social Determinants of Health     Financial Resource Strain: Not on file   Food Insecurity: Not on file  "  Transportation Needs: Not on file   Physical Activity: Not on file   Stress: Not on file   Social Connections: Not on file   Intimate Partner Violence: Not on file   Housing Stability: Not on file       Problems, Past medical history, past surgical history, Medications, allergies, social and family history reviewed and as per the electronic medical record from today's encounter    Review of Systems:  CONST: No fever, chills, fatigue, weight changes  EYES: No loss of vision  ENT: No hearing loss, tinnitus  CV: No chest pain, palpitations  RESP: No dyspnea, shortness of breath, cough  GI: No stool incontinence, nausea, vomiting  : No urinary incontinence  MSK: No joint swelling  SKIN: No rash, no hives  NEURO: No headache, dizziness, weakness  PSYCH: Hx of anxiety and depression  HEM/LYMPH: No easy bruising or bleeding  All other systems reviewed are negative     Physical Exam:  Vitals: /79   Pulse 84   Resp 18   Ht 1.626 m (5' 4\")   Wt 109 kg (240 lb)   BMI 41.20 kg/m²   General: No apparent distress. Alert, appropriate, oriented x 3. Mood and affect normal. Speaking in full sentences.  HENT: Normocephalic, atraumatic. Hearing intact.  Eyes: Extraocular movements grossly intact. Pupils equal and round.   Neck: Supple, trachea midline.  Lungs: Symmetric respiratory excursion on visual exam, nonlabored breathing.  Extremities: No clubbing, cyanosis, or edema noted in arms or legs.  Skin: No rashes, lesions, alopecia noted on back or extremities.   Back: Reports pain to palpation of left GTB  Neuro: Alert and appropriate. Gait within normal limits. Bulk and tone within normal limits.    Laboratory Data:  The following laboratory data were reviewed during this visit:   Lab Results   Component Value Date    WBC 6.3 08/29/2023    RBC 4.06 08/29/2023    HGB 12.8 08/29/2023    HCT 38.8 08/29/2023     08/29/2023      No results found for: \"INR\"  Lab Results   Component Value Date    CREATININE 1.0 " 08/29/2023    HGBA1C 5.7 03/28/2018       Imaging:  The following imaging impressions were reviewed by me during this visit:    -1/17/22 cervical spine MRI shows C5-6 circumferential disc bulge at this level, mild facet arthrosis and uncovertebral joint spurring bilaterally resulting in bilateral foraminal stenosis, more severe on the right than on the left.  -11/6/23 left shoulder xray shows mild degenerative narrowing and spurring of the acromioclavicular joint space. The glenohumeral joint space overall appears maintained.      I also personally reviewed the images from the above studies myself. These images and my interpretation of them contributed to the management and decision making of the patient's medical plan.    ASSESSMENT:  Ms. Arin Phan is a 58 y.o. female with left neck and shoulder pain, left lateral hip pain that is consistent with:    1. Cervical radiculopathy    2. Greater trochanteric bursitis of left hip    3. Impingement of left shoulder    4. Arthropathy of cervical facet joint          PLAN:    Diagnostics:   -I reviewed her most recent cervical spine MRI (see above for details).  No new diagnostics at this time    Physical Therapy and Rehabilitation:     -Completed physical therapy for her pain in the past, without durable improvement.  She should maintain her current home exercise program    Psychologically:  -No needs at this time    Medications  -Discontinue Celecoxib.  She does not feel is providing much pain relief right now.  She can return to taking OTC NSAID's    Duration  - Multiple years    Interventions:  -I suspect her neck and shoulder pain is multifactorial in the setting of cervical radiculopathy, cervical facet arthropathy, and left shoulder impingement.  She has previously undergone a C6/7 MARK with 60% pain relief for 3 to 4 months.  Her neck/arm pain is back to baseline and she is interested in repeating this procedure utilizing live fluoroscopy and local anesthesia.  I  would also consider to cervical mbb/RF ablation in the future as a means of providing pain relief while avoiding additional steroid use since she is getting steroid injections from multiple different providers.  - Her left lateral hip pain is consistent with greater trochanteric bursitis. After her cervical MARK is performed I will schedule a left greater trochanteric bursa injection utilizing live fluoroscopy and local anesthesia.        Sincerely,  Efrem Lyn MD  [facility] - [place]

## 2024-04-10 NOTE — H&P (VIEW-ONLY)
[facility]  Follow Up Office Visit Note 2024    Patient Information: Arin Phan, MRN: [id number], : 1965   Primary Care/Referring Physician: Alejandrina Hodge MD, [address] [facility] [address] / [facility] [id number]     Chief Complaint: Neck and shoulder pain    Interval History: Mrs. Phan presents today for follow up.    Today she reports no changes since last seen. We were planning to perform a C6/7 MARK but this was delayed due to her foot surgery. She has recovered from the surgery and would like to proceed with this injection. She continues to bilateral axial neck pain, worse on the left, with pain down both arms as well as headaches (prominent above her right eye)    She also has significant left lateral hip pain and can't sleep on that side. She has gotten a blind left GTB injection without much pain relief in the past, but feels that the injection wasn't done in the right spot.     When she first started Celecoxib felt it was helping, but is not sure it is providing much relief anymore.  She would like to stop this and return to taking over-the-counter NSAIDs    Brief History of Pain: Ms. Arin Phan is a 58 y.o. female with a PMHx of asthma, GAYE, anxiety, depression who presents for left sided neck and shoulder pain.    She describes primarily axial neck pain, worse on the left, which worsens when extending her head, rotating her head to the left.  This pain radiates down to her left shoulder but not further down the arm.  She does describe intermittent tingling in her hands.  She has undergone a C6/7 MARK with Dr. Jones in the past with over 60% pain relief for 3-4 months. She has also been utilizing Celecoxib daily and ran out a few months ago with significant worsening of her pain.     She also describes pain in other areas of her body, including her left shoulder, left hip, and bilateral knees. Was last seen by Dr. Vegas on 23 who performed a left subacromial bursa injection.  She is also seeing Dr. Prescott for hip and knee pain (has had injections in both knees and her left hip). Of note, she is planning to undergo surgery on her right foot on 2/7/24.     Current Pain Medications: OTC Tylenol, Celecoxib 200 mg daily, Duloxetine 60 mg daily,   Previously Tried Pain Medications:    Relevant Surgeries: Denies spine or major orthopedic surgery   Injections: Underwent bilateral subacromial bursa injections, left hip injection, bilateral knee injections, C6/7 MARK   Physical/Occupational Therapy: Has done PT for this pain in the past    Medications:   Current Outpatient Medications   Medication Instructions    acetaminophen (TylenoL) 325 mg tablet Every 4 hours    albuterol (Ventolin HFA) 90 mcg/actuation inhaler inhalation    aspirin-acetaminophen-caffeine (Excedrin Extra Strength) 250-250-65 mg tablet Every 6 hours    biotin 5 mg capsule Every 24 hours    budesonide-formoteroL (Symbicort) 160-4.5 mcg/actuation inhaler 2 puffs, inhalation, 2 times daily    buPROPion XL (Wellbutrin XL) 150 mg 24 hr tablet TAKE ONE TABLET BY MOUTH DAILY IN THE MORNING ONCE A DAY WITH A 300 MG TABLET    buPROPion XL (Wellbutrin XL) 300 mg 24 hr tablet TAKE ONE TABLET BY MOUTH DAILY IN THE MORNING WITH  MG DOSE    calcium citrate-vitamin D3 500 mg-12.5 mcg (500 unit) tablet,chewable 1 tablet, oral, 2 times daily    cholecalciferol, vitamin D3, 25 mcg (1,000 unit) tablet,chewable 1 tablet, oral, Daily    cyanocobalamin (Vitamin B-12) 100 mcg tablet 1 tablet, oral, Daily    Dulera 100-5 mcg/actuation inhaler Every 12 hours    DULoxetine (CYMBALTA) 60 mg, oral, Daily    fluticasone (Flonase) 50 mcg/actuation nasal spray INSTILL 1 SPRAY INTO EACH NOSTRIL DAILY FOR 14 DAYS    levocetirizine (Xyzal) 5 mg tablet Every 24 hours    meclizine (Antivert) 12.5 mg tablet TAKE 1 TO 2 TABLETS BY MOUTH 3 TIMES A DAY for 10    multivit-minerals/folic acid (ADULT ONE DAILY MULTIVITAMIN ORAL) oral, As directed    ondansetron  (Zofran) 4 mg tablet TAKE ONE TABLET BY MOUTH EVERY 4 HOURS AS NEEDED FOR NAUSEA/VOMITING/HEADACHE    ondansetron (Zofran) 4 mg tablet 1 tablet, oral, Every 4 hours PRN,  N/V/HA for 30 days<BR>    oxyCODONE-acetaminophen (Percocet) 5-325 mg tablet TAKE ONE TABLET BY MOUTH EVERY 6 HOURS AS NEEDED FOR PAIN FOR 7 DAYS    predniSONE (Deltasone) 10 mg tablet TAKE 4 TABLETS (40 MG) BY MOUTH DAILY FOR 4 DAYS  THEN TAKE 2 TABLETS (20 MG) DAILY FOR 4 DAYS  THEN TAKE 1 TABLET (10 MG) DAILY FOR 4 DAYS.    sulfamethoxazole-trimethoprim (Bactrim DS) 800-160 mg tablet TAKE ONE TABLET BY MOUTH TWO TIMES A DAY FOR 3 DAYS    triamcinolone (Kenalog) 0.025 % cream APPLY TOPICALLY 2-4 TIMES DAILY. DO NOT APPLY TO FACE/INTERTRIGINOUS (ARMPIT OR GROIN) AREAS.      Allergies:   Allergies   Allergen Reactions    House Dust Mite Other     congested, stuffy    Mold Other     congested, stuffy       Past Medical & Surgical History:  History reviewed. No pertinent past medical history. History reviewed. No pertinent surgical history.    Family History   Problem Relation Name Age of Onset    Heart disease Mother 81     Coronary artery disease Mother 81     Other (s/p cabg) Mother 81     Hypertension Father 81     Obesity Father 81     Atrial fibrillation Father 81     Heart failure Father 81     Other (CRF) Father 81     Other (CVA) Brother 59      Social History     Socioeconomic History    Marital status: Single     Spouse name: Not on file    Number of children: Not on file    Years of education: Not on file    Highest education level: Not on file   Occupational History    Not on file   Tobacco Use    Smoking status: Never    Smokeless tobacco: Never   Substance and Sexual Activity    Alcohol use: Never    Drug use: Never    Sexual activity: Not on file   Other Topics Concern    Not on file   Social History Narrative    Not on file     Social Determinants of Health     Financial Resource Strain: Not on file   Food Insecurity: Not on file  "  Transportation Needs: Not on file   Physical Activity: Not on file   Stress: Not on file   Social Connections: Not on file   Intimate Partner Violence: Not on file   Housing Stability: Not on file       Problems, Past medical history, past surgical history, Medications, allergies, social and family history reviewed and as per the electronic medical record from today's encounter    Review of Systems:  CONST: No fever, chills, fatigue, weight changes  EYES: No loss of vision  ENT: No hearing loss, tinnitus  CV: No chest pain, palpitations  RESP: No dyspnea, shortness of breath, cough  GI: No stool incontinence, nausea, vomiting  : No urinary incontinence  MSK: No joint swelling  SKIN: No rash, no hives  NEURO: No headache, dizziness, weakness  PSYCH: Hx of anxiety and depression  HEM/LYMPH: No easy bruising or bleeding  All other systems reviewed are negative     Physical Exam:  Vitals: /79   Pulse 84   Resp 18   Ht 1.626 m (5' 4\")   Wt 109 kg (240 lb)   BMI 41.20 kg/m²   General: No apparent distress. Alert, appropriate, oriented x 3. Mood and affect normal. Speaking in full sentences.  HENT: Normocephalic, atraumatic. Hearing intact.  Eyes: Extraocular movements grossly intact. Pupils equal and round.   Neck: Supple, trachea midline.  Lungs: Symmetric respiratory excursion on visual exam, nonlabored breathing.  Extremities: No clubbing, cyanosis, or edema noted in arms or legs.  Skin: No rashes, lesions, alopecia noted on back or extremities.   Back: Reports pain to palpation of left GTB  Neuro: Alert and appropriate. Gait within normal limits. Bulk and tone within normal limits.    Laboratory Data:  The following laboratory data were reviewed during this visit:   Lab Results   Component Value Date    WBC 6.3 08/29/2023    RBC 4.06 08/29/2023    HGB 12.8 08/29/2023    HCT 38.8 08/29/2023     08/29/2023      No results found for: \"INR\"  Lab Results   Component Value Date    CREATININE 1.0 " 08/29/2023    HGBA1C 5.7 03/28/2018       Imaging:  The following imaging impressions were reviewed by me during this visit:    -1/17/22 cervical spine MRI shows C5-6 circumferential disc bulge at this level, mild facet arthrosis and uncovertebral joint spurring bilaterally resulting in bilateral foraminal stenosis, more severe on the right than on the left.  -11/6/23 left shoulder xray shows mild degenerative narrowing and spurring of the acromioclavicular joint space. The glenohumeral joint space overall appears maintained.      I also personally reviewed the images from the above studies myself. These images and my interpretation of them contributed to the management and decision making of the patient's medical plan.    ASSESSMENT:  Ms. Arin Phan is a 58 y.o. female with left neck and shoulder pain, left lateral hip pain that is consistent with:    1. Cervical radiculopathy    2. Greater trochanteric bursitis of left hip    3. Impingement of left shoulder    4. Arthropathy of cervical facet joint          PLAN:    Diagnostics:   -I reviewed her most recent cervical spine MRI (see above for details).  No new diagnostics at this time    Physical Therapy and Rehabilitation:     -Completed physical therapy for her pain in the past, without durable improvement.  She should maintain her current home exercise program    Psychologically:  -No needs at this time    Medications  -Discontinue Celecoxib.  She does not feel is providing much pain relief right now.  She can return to taking OTC NSAID's    Duration  - Multiple years    Interventions:  -I suspect her neck and shoulder pain is multifactorial in the setting of cervical radiculopathy, cervical facet arthropathy, and left shoulder impingement.  She has previously undergone a C6/7 MARK with 60% pain relief for 3 to 4 months.  Her neck/arm pain is back to baseline and she is interested in repeating this procedure utilizing live fluoroscopy and local anesthesia.  I  would also consider to cervical mbb/RF ablation in the future as a means of providing pain relief while avoiding additional steroid use since she is getting steroid injections from multiple different providers.  - Her left lateral hip pain is consistent with greater trochanteric bursitis. After her cervical MARK is performed I will schedule a left greater trochanteric bursa injection utilizing live fluoroscopy and local anesthesia.        Sincerely,  Efrem Lyn MD  [facility] - [place]

## 2024-04-11 ENCOUNTER — OFFICE VISIT (OUTPATIENT)
Dept: PAIN MEDICINE | Facility: CLINIC | Age: 59
End: 2024-04-11
Payer: MEDICARE

## 2024-04-11 VITALS
SYSTOLIC BLOOD PRESSURE: 130 MMHG | DIASTOLIC BLOOD PRESSURE: 79 MMHG | WEIGHT: 240 LBS | BODY MASS INDEX: 40.97 KG/M2 | RESPIRATION RATE: 18 BRPM | HEART RATE: 84 BPM | HEIGHT: 64 IN

## 2024-04-11 DIAGNOSIS — M25.812 IMPINGEMENT OF LEFT SHOULDER: ICD-10-CM

## 2024-04-11 DIAGNOSIS — M54.12 CERVICAL RADICULOPATHY: Primary | ICD-10-CM

## 2024-04-11 DIAGNOSIS — M70.62 GREATER TROCHANTERIC BURSITIS OF LEFT HIP: ICD-10-CM

## 2024-04-11 DIAGNOSIS — M47.812 ARTHROPATHY OF CERVICAL FACET JOINT: ICD-10-CM

## 2024-04-11 PROCEDURE — 99214 OFFICE O/P EST MOD 30 MIN: CPT | Performed by: STUDENT IN AN ORGANIZED HEALTH CARE EDUCATION/TRAINING PROGRAM

## 2024-04-11 PROCEDURE — 1036F TOBACCO NON-USER: CPT | Performed by: STUDENT IN AN ORGANIZED HEALTH CARE EDUCATION/TRAINING PROGRAM

## 2024-04-11 ASSESSMENT — PATIENT HEALTH QUESTIONNAIRE - PHQ9
SUM OF ALL RESPONSES TO PHQ9 QUESTIONS 1 AND 2: 0
1. LITTLE INTEREST OR PLEASURE IN DOING THINGS: NOT AT ALL
2. FEELING DOWN, DEPRESSED OR HOPELESS: NOT AT ALL

## 2024-04-11 ASSESSMENT — PAIN DESCRIPTION - DESCRIPTORS: DESCRIPTORS: ACHING;SORE;TINGLING

## 2024-04-11 ASSESSMENT — PAIN SCALES - GENERAL
PAINLEVEL: 7
PAINLEVEL_OUTOF10: 7

## 2024-04-11 ASSESSMENT — PAIN - FUNCTIONAL ASSESSMENT: PAIN_FUNCTIONAL_ASSESSMENT: 0-10

## 2024-04-12 ENCOUNTER — TELEPHONE (OUTPATIENT)
Dept: PAIN MEDICINE | Facility: CLINIC | Age: 59
End: 2024-04-12
Payer: MEDICARE

## 2024-04-16 DIAGNOSIS — M25.812 IMPINGEMENT OF LEFT SHOULDER: ICD-10-CM

## 2024-04-16 DIAGNOSIS — F41.8 DEPRESSION WITH ANXIETY: Primary | ICD-10-CM

## 2024-04-16 DIAGNOSIS — M54.12 CERVICAL RADICULOPATHY: ICD-10-CM

## 2024-04-16 DIAGNOSIS — M47.812 ARTHROPATHY OF CERVICAL FACET JOINT: ICD-10-CM

## 2024-04-16 RX ORDER — BUPROPION HYDROCHLORIDE 300 MG/1
TABLET ORAL
Qty: 30 TABLET | Refills: 0 | Status: SHIPPED | OUTPATIENT
Start: 2024-04-16

## 2024-04-16 RX ORDER — BUPROPION HYDROCHLORIDE 150 MG/1
TABLET ORAL
Qty: 30 TABLET | Refills: 0 | Status: SHIPPED | OUTPATIENT
Start: 2024-04-16

## 2024-04-16 RX ORDER — DULOXETIN HYDROCHLORIDE 60 MG/1
60 CAPSULE, DELAYED RELEASE ORAL DAILY
Qty: 30 CAPSULE | Refills: 0 | Status: SHIPPED | OUTPATIENT
Start: 2024-04-16

## 2024-04-16 RX ORDER — CELECOXIB 200 MG/1
CAPSULE ORAL
Qty: 30 CAPSULE | Refills: 2 | Status: SHIPPED | OUTPATIENT
Start: 2024-04-16 | End: 2024-04-29 | Stop reason: ALTCHOICE

## 2024-04-29 ENCOUNTER — HOSPITAL ENCOUNTER (OUTPATIENT)
Dept: RADIOLOGY | Facility: HOSPITAL | Age: 59
Discharge: HOME | End: 2024-04-29
Payer: MEDICARE

## 2024-04-29 ENCOUNTER — HOSPITAL ENCOUNTER (OUTPATIENT)
Dept: GASTROENTEROLOGY | Facility: HOSPITAL | Age: 59
Discharge: HOME | End: 2024-04-29
Payer: MEDICARE

## 2024-04-29 VITALS
RESPIRATION RATE: 16 BRPM | HEIGHT: 64 IN | TEMPERATURE: 96.8 F | HEART RATE: 73 BPM | BODY MASS INDEX: 38.93 KG/M2 | DIASTOLIC BLOOD PRESSURE: 50 MMHG | SYSTOLIC BLOOD PRESSURE: 133 MMHG | WEIGHT: 228 LBS | OXYGEN SATURATION: 100 %

## 2024-04-29 DIAGNOSIS — M54.12 CERVICAL RADICULOPATHY: ICD-10-CM

## 2024-04-29 PROCEDURE — 2550000001 HC RX 255 CONTRASTS: Performed by: STUDENT IN AN ORGANIZED HEALTH CARE EDUCATION/TRAINING PROGRAM

## 2024-04-29 PROCEDURE — 62321 NJX INTERLAMINAR CRV/THRC: CPT | Performed by: STUDENT IN AN ORGANIZED HEALTH CARE EDUCATION/TRAINING PROGRAM

## 2024-04-29 PROCEDURE — 2500000005 HC RX 250 GENERAL PHARMACY W/O HCPCS: Performed by: STUDENT IN AN ORGANIZED HEALTH CARE EDUCATION/TRAINING PROGRAM

## 2024-04-29 PROCEDURE — 2500000004 HC RX 250 GENERAL PHARMACY W/ HCPCS (ALT 636 FOR OP/ED): Performed by: STUDENT IN AN ORGANIZED HEALTH CARE EDUCATION/TRAINING PROGRAM

## 2024-04-29 RX ORDER — LIDOCAINE HYDROCHLORIDE 10 MG/ML
INJECTION INFILTRATION; PERINEURAL AS NEEDED
Status: COMPLETED | OUTPATIENT
Start: 2024-04-29 | End: 2024-04-29

## 2024-04-29 RX ORDER — TRIAMCINOLONE ACETONIDE 40 MG/ML
INJECTION, SUSPENSION INTRA-ARTICULAR; INTRAMUSCULAR AS NEEDED
Status: COMPLETED | OUTPATIENT
Start: 2024-04-29 | End: 2024-04-29

## 2024-04-29 RX ADMIN — TRIAMCINOLONE ACETONIDE 40 MG: 40 INJECTION, SUSPENSION INTRA-ARTICULAR; INTRAMUSCULAR at 09:38

## 2024-04-29 RX ADMIN — LIDOCAINE HYDROCHLORIDE 6 ML: 10 INJECTION, SOLUTION INFILTRATION; PERINEURAL at 09:33

## 2024-04-29 RX ADMIN — IOHEXOL 0.5 ML: 240 INJECTION, SOLUTION INTRATHECAL; INTRAVASCULAR; INTRAVENOUS; ORAL at 09:38

## 2024-04-29 ASSESSMENT — PAIN - FUNCTIONAL ASSESSMENT
PAIN_FUNCTIONAL_ASSESSMENT: 0-10
PAIN_FUNCTIONAL_ASSESSMENT: 0-10

## 2024-04-29 ASSESSMENT — PAIN SCALES - GENERAL
PAINLEVEL_OUTOF10: 7
PAINLEVEL_OUTOF10: 7
PAINLEVEL_OUTOF10: 4

## 2024-04-29 ASSESSMENT — PAIN DESCRIPTION - DESCRIPTORS: DESCRIPTORS: BURNING

## 2024-04-29 ASSESSMENT — COLUMBIA-SUICIDE SEVERITY RATING SCALE - C-SSRS
1. IN THE PAST MONTH, HAVE YOU WISHED YOU WERE DEAD OR WISHED YOU COULD GO TO SLEEP AND NOT WAKE UP?: NO
2. HAVE YOU ACTUALLY HAD ANY THOUGHTS OF KILLING YOURSELF?: NO
6. HAVE YOU EVER DONE ANYTHING, STARTED TO DO ANYTHING, OR PREPARED TO DO ANYTHING TO END YOUR LIFE?: NO

## 2024-04-29 NOTE — OP NOTE
"Procedure Note: 2024    PROCEDURE NAME: Interlaminar Cervical Epidural Steroid Injection at C7/T1    Patient Information: Arin Phan, MRN: [id number], : 1965  Chief Complaint: Neck and arm pain    Pain Diagnosis: The diagnosis of Cervical radiculopathy has been made given the patient's clinical evaluation at prior evaluation.    Vitals:/66   Pulse 71   Temp 36 °C (96.8 °F) (Temporal)   Resp 16   Ht 1.626 m (5' 4\")   Wt 103 kg (228 lb)   LMP  (Exact Date)   SpO2 100%   BMI 39.14 kg/m²     DESCRIPTION OF PROCEDURE:    Arin Phan was brought to the procedure room. The assistant obtained vital signs, which were found to be stable. She was then informed of the procedure, its benefits, and its risks, and her questions were answered regarding the procedure. Written informed consent was obtained from the patient. Immediately prior to starting the procedure, a time-out safety check was conducted and the patient's identification, procedure name, and procedure site were confirmed with the patient.    Left Paramedian Cervical Interlaminar Epidural Steroid Injection, C7/T1  After informed written consent was obtained, the patient was placed in prone position with a pillow under the chest. Monitoring of pulse oximetry, heart rate, and blood pressure was done pre-procedure, and post-procedure. During the procedure the patient was monitored with continuous pulse-ox. A time out was performed before the beginning of the procedure. The correct site and laterality were marked. The skin was prepped with chlorhexidine, allowed to dry for a minimum of 3 minutes, and draped in a sterile fashion.     The C7/T1 vertebral level was identified with use of fluoroscopy in AP view with slight caudal tilt, and the target in the interlaminar space was identified and marked. The skin and subcutaneous tissue were anesthetized using 6 ml of 1% lidocaine with a 25 G 1.5 inch needle. A 3.5-inch 20 G Tuohy needle was " slowly advanced towards the posterior epidural space until it was felt to be engaged in the ligamentum flavum. Needle position was confirmed with contralateral oblique views. The epidural space was accessed using loss of resistance technique to normal saline. After negative aspiration for blood or CSF, 0.5 ml of Omnipaque contrast was injected to confirm the needle placement. A mixture of 40 mg triamcinolone diluted in 2 mL sterile normal saline with a total volume of 3 mL was injected in increments. The stylet was reinserted and the needle was removed    Of note, I attempted the injection first at C6/7 but was unable to get appropriate contrast spread with the needle tip anterior to the ventral interlaminar line, thus the decision was made to change to the C7/T1 level      Anesthesia: local anesthesia   Complications: none      Efrem Lyn MD

## 2024-04-29 NOTE — DISCHARGE INSTRUCTIONS
DISCHARGE INSTRUCTIONS FOR INJECTIONS     You underwent a cervical epidural steroid injection today    Aftermost injections, it is recommended that you relax and limit your activity for the remainder of the day unless you have been told otherwise by your pain physician.  You should not drive a car, operate machinery, or make important legal decisions unless otherwise directed by your pain physician.  You may resume your normal activity, including exercise, tomorrow.      Keep a written pain diary of how much pain relief you experienced following the injection procedure and the length of time of pain relief you experienced pain relief. Following diagnostic injections like medial branch nerve blocks, sacroiliac joint blocks, stellate ganglion injections and other blocks, it is very important you record the specific amount of pain relief you experienced immediately after the injectionand how long it lasted. Your doctor will ask you for this information at your follow up visit.     For all injections, please keep the injection site dry and inspect the site for a couple of days. You may remove the Band-Aid the day of the injection at any time.     Some discomfort, bruising or slight swelling may occur at the injection site. This is not abnormal if it occurs.  If needed you may:    -Take over the counter medication such as Tylenol or Motrin.   -Apply an ice pack for 30 minutes, 2 to 3 times a day for the first 24 hours.     You may shower today; no soaking baths, hot tubs, whirlpools or swimming pools for two days.      If you are given steroids in your injection, it may take 3-5 days for the steroid medication to take effect. You may notice a worsening of your symptoms for 1-2 days after the injection. This is not abnormal.  You may use acetaminophen, ibuprofen, or prescription medication that your doctor may have prescribed for you if you need to do so.     A few common side effects of steroids include facial flushing,  sweating, restlessness, irritability,difficulty sleeping, increase in blood sugar, and increased blood pressure. If you have diabetes, please monitor your blood sugar at least once a day for at least 5 days. If you have poorly controlled high blood pressure, monitoryour blood pressure for at least 2 days and contact your primary care physician if these numbers are unusually high for you.      If you take aspirin or non-steroidal anti-inflammatory drugs (examples are Motrin, Advil, ibuprofen, Naprosyn, Voltaren, Relafen, etc.) you may restart these this evening, but stop taking it 3 days before your next appointment, unless instructed otherwiseby your physician.      You do not need to discontinue non-aspirin-containing pain medications prior to an injection (examples: Celebrex, tramadol, hydrocodone and acetaminophen).      If you take a blood thinning medication (Coumadin, Lovenox, Fragmin,Ticlid, Plavix, Pradaxa, etc.), please discuss this with your primary care physician/cardiologist and your pain physician. These medications MUST be discontinued before you can have an injection safely, without the risk of uncontrolled bleeding. If these medications are not discontinued for an appropriate period of time, you will not be able to receivean injection.      If you are taking Coumadin, please have your INR checked the morning of your procedure and bringthe result to your appointment unless otherwise instructed. If your INR is over 1.2, your injection may need to be rescheduled to avoid uncontrolled bleeding from the needle placement.     Call [facility] at [phone] between 8am-4pm Monday - Friday if you are experiencing the following:    If you received an epidural or spinal injection:    -Headache that doesnot go away with medicine, is worse when sitting or standing up, and is greatly relieved upon lying down.   -Severe pain worse than or different than your baseline pain.   -Chills or fever  (101º F or greater).   -Drainage or signs of infection at the injection site     Go directly to the Emergency Department if you are experiencing the following and received an epidural or spinal injection:   -Abrupt weakness or progressive weakness in your legs that starts after you leave the clinic.   -Abrupt severe or worsening numbness in your legs.   -Inability to urinate after the injection or loss of bowel or bladder control without the urge to defecate or urinate.     If you have a clinical question that cannot wait until your next appointment, please call [phone] between 8am-4pm Monday - Friday or send a [employer] message. We do our best to return all non-emergency messages within 24 hours, Monday - Friday. A nurse or physician will return your message.      If you need to cancel an appointment, please call the scheduling staff at [phone] during normal business hours or leave a message at least 24 hours in advance.     If you are going to be sedated for your next procedure, you MUST have responsible adult who can legally drive accompany you home. You cannot eat or drink for eight hours prior to the planned procedure if you are going to receive sedation. You may take your non-blood thinning medications with a small sip of water.

## 2024-04-29 NOTE — PERIOPERATIVE NURSING NOTE
0953  Patient arrived in stable condition. Bed low and in locked position. Dressing is clean dry and intact. Juice given. VSS. Discharge instructions reviewed

## 2024-04-29 NOTE — Clinical Note
Dr. Lyn discussed with patient difficulty with C6/7 level and discussed approaching at a lower level at C7/T1, patient agrees. See doctors notes.

## 2024-05-03 ENCOUNTER — TELEPHONE (OUTPATIENT)
Dept: SURGERY | Facility: CLINIC | Age: 59
End: 2024-05-03
Payer: MEDICARE

## 2024-05-09 ENCOUNTER — TELEPHONE (OUTPATIENT)
Dept: SURGERY | Facility: CLINIC | Age: 59
End: 2024-05-09
Payer: MEDICARE

## 2024-05-13 ENCOUNTER — APPOINTMENT (OUTPATIENT)
Dept: GASTROENTEROLOGY | Facility: HOSPITAL | Age: 59
End: 2024-05-13
Payer: MEDICARE

## 2024-05-29 ENCOUNTER — APPOINTMENT (OUTPATIENT)
Dept: PAIN MEDICINE | Facility: CLINIC | Age: 59
End: 2024-05-29
Payer: MEDICARE

## 2024-06-03 ENCOUNTER — HOSPITAL ENCOUNTER (OUTPATIENT)
Dept: GASTROENTEROLOGY | Facility: HOSPITAL | Age: 59
Discharge: HOME | End: 2024-06-03
Payer: MEDICARE

## 2024-06-03 ENCOUNTER — HOSPITAL ENCOUNTER (OUTPATIENT)
Dept: RADIOLOGY | Facility: HOSPITAL | Age: 59
Discharge: HOME | End: 2024-06-03
Payer: MEDICARE

## 2024-06-03 VITALS
HEIGHT: 64 IN | WEIGHT: 218 LBS | SYSTOLIC BLOOD PRESSURE: 109 MMHG | RESPIRATION RATE: 17 BRPM | TEMPERATURE: 97 F | HEART RATE: 76 BPM | DIASTOLIC BLOOD PRESSURE: 42 MMHG | OXYGEN SATURATION: 99 % | BODY MASS INDEX: 37.22 KG/M2

## 2024-06-03 DIAGNOSIS — M70.62 GREATER TROCHANTERIC BURSITIS OF LEFT HIP: ICD-10-CM

## 2024-06-03 PROCEDURE — 7100000009 HC PHASE TWO TIME - INITIAL BASE CHARGE

## 2024-06-03 PROCEDURE — 7100000010 HC PHASE TWO TIME - EACH INCREMENTAL 1 MINUTE

## 2024-06-03 PROCEDURE — 2500000004 HC RX 250 GENERAL PHARMACY W/ HCPCS (ALT 636 FOR OP/ED): Performed by: STUDENT IN AN ORGANIZED HEALTH CARE EDUCATION/TRAINING PROGRAM

## 2024-06-03 PROCEDURE — 2500000005 HC RX 250 GENERAL PHARMACY W/O HCPCS: Performed by: STUDENT IN AN ORGANIZED HEALTH CARE EDUCATION/TRAINING PROGRAM

## 2024-06-03 PROCEDURE — 20610 DRAIN/INJ JOINT/BURSA W/O US: CPT | Performed by: STUDENT IN AN ORGANIZED HEALTH CARE EDUCATION/TRAINING PROGRAM

## 2024-06-03 PROCEDURE — 2550000001 HC RX 255 CONTRASTS: Performed by: STUDENT IN AN ORGANIZED HEALTH CARE EDUCATION/TRAINING PROGRAM

## 2024-06-03 RX ORDER — TRIAMCINOLONE ACETONIDE 40 MG/ML
INJECTION, SUSPENSION INTRA-ARTICULAR; INTRAMUSCULAR AS NEEDED
Status: COMPLETED | OUTPATIENT
Start: 2024-06-03 | End: 2024-06-03

## 2024-06-03 RX ORDER — LIDOCAINE HYDROCHLORIDE 10 MG/ML
INJECTION INFILTRATION; PERINEURAL AS NEEDED
Status: COMPLETED | OUTPATIENT
Start: 2024-06-03 | End: 2024-06-03

## 2024-06-03 RX ORDER — BUPIVACAINE HYDROCHLORIDE 2.5 MG/ML
INJECTION, SOLUTION INFILTRATION; PERINEURAL AS NEEDED
Status: COMPLETED | OUTPATIENT
Start: 2024-06-03 | End: 2024-06-03

## 2024-06-03 RX ADMIN — IOHEXOL 2 ML: 240 INJECTION, SOLUTION INTRATHECAL; INTRAVASCULAR; INTRAVENOUS; ORAL at 09:34

## 2024-06-03 RX ADMIN — LIDOCAINE HYDROCHLORIDE 3 ML: 10 INJECTION, SOLUTION INFILTRATION; PERINEURAL at 09:32

## 2024-06-03 RX ADMIN — BUPIVACAINE HYDROCHLORIDE 5 ML: 2.5 INJECTION, SOLUTION INFILTRATION; PERINEURAL at 09:33

## 2024-06-03 RX ADMIN — TRIAMCINOLONE ACETONIDE 40 MG: 40 INJECTION, SUSPENSION INTRA-ARTICULAR; INTRAMUSCULAR at 09:33

## 2024-06-03 ASSESSMENT — PAIN SCALES - GENERAL
PAINLEVEL_OUTOF10: 6
PAINLEVEL_OUTOF10: 0 - NO PAIN

## 2024-06-03 ASSESSMENT — COLUMBIA-SUICIDE SEVERITY RATING SCALE - C-SSRS
6. HAVE YOU EVER DONE ANYTHING, STARTED TO DO ANYTHING, OR PREPARED TO DO ANYTHING TO END YOUR LIFE?: NO
2. HAVE YOU ACTUALLY HAD ANY THOUGHTS OF KILLING YOURSELF?: NO
1. IN THE PAST MONTH, HAVE YOU WISHED YOU WERE DEAD OR WISHED YOU COULD GO TO SLEEP AND NOT WAKE UP?: NO

## 2024-06-03 ASSESSMENT — ENCOUNTER SYMPTOMS
PALPITATIONS: 0
LIGHT-HEADEDNESS: 0
COUGH: 0
DYSPHORIC MOOD: 0
SORE THROAT: 0
WEAKNESS: 0
DIZZINESS: 0
HEADACHES: 0
CHILLS: 0
FEVER: 0
NERVOUS/ANXIOUS: 0
UNEXPECTED WEIGHT CHANGE: 0
RHINORRHEA: 0
SHORTNESS OF BREATH: 0
COLOR CHANGE: 0
MYALGIAS: 0
BRUISES/BLEEDS EASILY: 0
NUMBNESS: 0
EYE DISCHARGE: 0

## 2024-06-03 ASSESSMENT — PAIN - FUNCTIONAL ASSESSMENT
PAIN_FUNCTIONAL_ASSESSMENT: 0-10
PAIN_FUNCTIONAL_ASSESSMENT: 0-10

## 2024-06-03 ASSESSMENT — PAIN DESCRIPTION - DESCRIPTORS: DESCRIPTORS: BURNING;ACHING

## 2024-06-03 NOTE — DISCHARGE INSTRUCTIONS
DISCHARGE INSTRUCTIONS FOR INJECTIONS     You underwent a left sided greater trochanteric bursa injection today    Aftermost injections, it is recommended that you relax and limit your activity for the remainder of the day unless you have been told otherwise by your pain physician.  You should not drive a car, operate machinery, or make important legal decisions unless otherwise directed by your pain physician.  You may resume your normal activity, including exercise, tomorrow.      Keep a written pain diary of how much pain relief you experienced following the injection procedure and the length of time of pain relief you experienced pain relief. Following diagnostic injections like medial branch nerve blocks, sacroiliac joint blocks, stellate ganglion injections and other blocks, it is very important you record the specific amount of pain relief you experienced immediately after the injectionand how long it lasted. Your doctor will ask you for this information at your follow up visit.     For all injections, please keep the injection site dry and inspect the site for a couple of days. You may remove the Band-Aid the day of the injection at any time.     Some discomfort, bruising or slight swelling may occur at the injection site. This is not abnormal if it occurs.  If needed you may:    -Take over the counter medication such as Tylenol or Motrin.   -Apply an ice pack for 30 minutes, 2 to 3 times a day for the first 24 hours.     You may shower today; no soaking baths, hot tubs, whirlpools or swimming pools for two days.      If you are given steroids in your injection, it may take 3-5 days for the steroid medication to take effect. You may notice a worsening of your symptoms for 1-2 days after the injection. This is not abnormal.  You may use acetaminophen, ibuprofen, or prescription medication that your doctor may have prescribed for you if you need to do so.     A few common side effects of steroids include  facial flushing, sweating, restlessness, irritability,difficulty sleeping, increase in blood sugar, and increased blood pressure. If you have diabetes, please monitor your blood sugar at least once a day for at least 5 days. If you have poorly controlled high blood pressure, monitoryour blood pressure for at least 2 days and contact your primary care physician if these numbers are unusually high for you.      If you take aspirin or non-steroidal anti-inflammatory drugs (examples are Motrin, Advil, ibuprofen, Naprosyn, Voltaren, Relafen, etc.) you may restart these this evening, but stop taking it 3 days before your next appointment, unless instructed otherwiseby your physician.      You do not need to discontinue non-aspirin-containing pain medications prior to an injection (examples: Celebrex, tramadol, hydrocodone and acetaminophen).      If you take a blood thinning medication (Coumadin, Lovenox, Fragmin,Ticlid, Plavix, Pradaxa, etc.), please discuss this with your primary care physician/cardiologist and your pain physician. These medications MUST be discontinued before you can have an injection safely, without the risk of uncontrolled bleeding. If these medications are not discontinued for an appropriate period of time, you will not be able to receivean injection.      If you are taking Coumadin, please have your INR checked the morning of your procedure and bringthe result to your appointment unless otherwise instructed. If your INR is over 1.2, your injection may need to be rescheduled to avoid uncontrolled bleeding from the needle placement.     Call [facility] at [phone] between 8am-4pm Monday - Friday if you are experiencing the following:    If you received an epidural or spinal injection:    -Headache that doesnot go away with medicine, is worse when sitting or standing up, and is greatly relieved upon lying down.   -Severe pain worse than or different than your baseline pain.    -Chills or fever (101º F or greater).   -Drainage or signs of infection at the injection site     Go directly to the Emergency Department if you are experiencing the following and received an epidural or spinal injection:   -Abrupt weakness or progressive weakness in your legs that starts after you leave the clinic.   -Abrupt severe or worsening numbness in your legs.   -Inability to urinate after the injection or loss of bowel or bladder control without the urge to defecate or urinate.     If you have a clinical question that cannot wait until your next appointment, please call [phone] between 8am-4pm Monday - Friday or send a [employer] message. We do our best to return all non-emergency messages within 24 hours, Monday - Friday. A nurse or physician will return your message.      If you need to cancel an appointment, please call the scheduling staff at [phone] during normal business hours or leave a message at least 24 hours in advance.     If you are going to be sedated for your next procedure, you MUST have responsible adult who can legally drive accompany you home. You cannot eat or drink for eight hours prior to the planned procedure if you are going to receive sedation. You may take your non-blood thinning medications with a small sip of water.      Gestational Age less than 36 Weeks

## 2024-06-03 NOTE — OP NOTE
"Procedure Note: 6/3/2024    PROCEDURE NAME: Left greater trochanteric bursa injection    Patient Information: Arin Phan, MRN: [id number], : 1965  Chief Complaint: Left lateral hip pain    Pain Diagnosis: The diagnosis of Greater trochanteric bursitis of left hip has been made given the patient's clinical evaluation at prior evaluation.    Vitals:/71   Pulse 87   Temp 36.1 °C (97 °F) (Temporal)   Resp 17   Ht 1.626 m (5' 4\")   Wt 98.9 kg (218 lb)   SpO2 99%   BMI 37.42 kg/m²     DESCRIPTION OF PROCEDURE:    Arin Phan was brought to the procedure room. The assistant obtained vital signs, which were found to be stable. She was then informed of the procedure, its benefits, and its risks, and her questions were answered regarding the procedure. Written informed consent was obtained from the patient. Immediately prior to starting the procedure, a time-out safety check was conducted and the patient's identification, procedure name, and procedure site were confirmed with the patient.    Left Greater Trochanteric Bursa Injection  After informed written consent was obtained, the patient was placed in the right lateral decubitus position on the fluoroscopy table. Monitoring of pulse oximetry, heart rate, and blood pressure was done pre-procedure, and post-procedure. During the procedure the patient was monitored with continuous pulse-ox. A time out was performed before the beginning of the procedure. The correct site and laterality were marked. The left hip was prepped and draped in sterile fashion using ChloraPrep and and allowed to dry for 3 minutes.     AP and lateral views were used to identify the left hip under fluoroscopy and the area overlying the great trochanter. A marker was used to identify the skin entry site just lateral to the greater trochanter. The skin and subcutaneous tissue was anesthetized using 5 mL of 1% plain lidocaine with 1.5-inch 25-gauge needle at this site. This same " needle was slowly advanced towards the upper border of the greater trochanter. At a position with the tip of the needle just lateral to the trochanter, negative aspiration was confirmed and 0.5 mL of Omnipaque contrast dye was injected. A lenticular outline of contrast on fluoroscopy indicated proximity to the greater trochanteric bursa. A total volume of 3 ml of 40 mg of methylprednisolone (40 mg/mL) diluted in 3 mL of bupivacaine 0.25% was then injected. The needle was then removed      Anesthesia: local anesthesia   Complications: none      Efrem Lyn MD

## 2024-06-03 NOTE — H&P
History Of Present Illness  Arin Phan is a 58 y.o. female presenting with left lateral hip pain secondary to greater trochanteric bursitis. She denies any changes to her pain or underlying health since last seen in the office.     Past Medical History  History reviewed. No pertinent past medical history.    Surgical History  History reviewed. No pertinent surgical history.     Social History  She reports that she has never smoked. She has never used smokeless tobacco. She reports that she does not drink alcohol and does not use drugs.    Family History  Family History   Problem Relation Name Age of Onset    Heart disease Mother 81     Coronary artery disease Mother 81     Other (s/p cabg) Mother 81     Hypertension Father 81     Obesity Father 81     Atrial fibrillation Father 81     Heart failure Father 81     Other (CRF) Father 81     Other (CVA) Brother 59         Allergies  House dust mite and Mold    Review of Systems   Constitutional:  Negative for chills, fever and unexpected weight change.   HENT:  Negative for congestion, rhinorrhea, sneezing and sore throat.    Eyes:  Negative for discharge.   Respiratory:  Negative for cough and shortness of breath.    Cardiovascular:  Negative for chest pain, palpitations and leg swelling.   Musculoskeletal:  Negative for gait problem and myalgias.   Skin:  Negative for color change and rash.   Neurological:  Negative for dizziness, weakness, light-headedness, numbness and headaches.   Hematological:  Does not bruise/bleed easily.   Psychiatric/Behavioral:  Negative for dysphoric mood. The patient is not nervous/anxious.         Physical Exam  Vitals and nursing note reviewed.   Constitutional:       General: She is not in acute distress.     Appearance: Normal appearance. She is not ill-appearing.   HENT:      Head: Normocephalic and atraumatic.   Eyes:      Conjunctiva/sclera: Conjunctivae normal.   Pulmonary:      Effort: Pulmonary effort is normal. No  "respiratory distress.   Musculoskeletal:         General: No swelling or deformity.      Cervical back: Neck supple. No rigidity or tenderness.      Right lower leg: No edema.      Left lower leg: No edema.   Skin:     General: Skin is warm and dry.      Findings: No bruising, erythema, lesion or rash.   Neurological:      General: No focal deficit present.      Mental Status: She is alert and oriented to person, place, and time.   Psychiatric:         Mood and Affect: Mood normal.          Last Recorded Vitals  Blood pressure 127/71, pulse 87, temperature 36.1 °C (97 °F), temperature source Temporal, resp. rate 17, height 1.626 m (5' 4\"), weight 98.9 kg (218 lb), SpO2 99%.    Relevant Results           Assessment/Plan   Active Problems:  Greater trochanteric bursitis of the left hip    She continues to have left lateral hip pain. Will proceed with left greater trochanteric bursa injection as discussed         I spent 10 minutes in the professional and overall care of this patient.      Efrem Lyn MD    "

## 2024-06-14 ENCOUNTER — HOSPITAL ENCOUNTER (OUTPATIENT)
Dept: RADIOLOGY | Facility: CLINIC | Age: 59
Discharge: HOME | End: 2024-06-14
Payer: MEDICARE

## 2024-06-14 ENCOUNTER — OFFICE VISIT (OUTPATIENT)
Dept: ORTHOPEDIC SURGERY | Facility: CLINIC | Age: 59
End: 2024-06-14
Payer: MEDICARE

## 2024-06-14 DIAGNOSIS — M25.561 BILATERAL CHRONIC KNEE PAIN: Primary | ICD-10-CM

## 2024-06-14 DIAGNOSIS — G89.29 BILATERAL CHRONIC KNEE PAIN: ICD-10-CM

## 2024-06-14 DIAGNOSIS — M25.562 BILATERAL CHRONIC KNEE PAIN: ICD-10-CM

## 2024-06-14 DIAGNOSIS — M25.562 BILATERAL CHRONIC KNEE PAIN: Primary | ICD-10-CM

## 2024-06-14 DIAGNOSIS — G89.29 BILATERAL CHRONIC KNEE PAIN: Primary | ICD-10-CM

## 2024-06-14 DIAGNOSIS — M25.561 BILATERAL CHRONIC KNEE PAIN: ICD-10-CM

## 2024-06-14 PROCEDURE — 20610 DRAIN/INJ JOINT/BURSA W/O US: CPT | Mod: 50 | Performed by: STUDENT IN AN ORGANIZED HEALTH CARE EDUCATION/TRAINING PROGRAM

## 2024-06-14 PROCEDURE — 2500000005 HC RX 250 GENERAL PHARMACY W/O HCPCS: Performed by: STUDENT IN AN ORGANIZED HEALTH CARE EDUCATION/TRAINING PROGRAM

## 2024-06-14 PROCEDURE — 73562 X-RAY EXAM OF KNEE 3: CPT | Mod: 50

## 2024-06-14 PROCEDURE — 99214 OFFICE O/P EST MOD 30 MIN: CPT | Performed by: STUDENT IN AN ORGANIZED HEALTH CARE EDUCATION/TRAINING PROGRAM

## 2024-06-14 PROCEDURE — 2500000004 HC RX 250 GENERAL PHARMACY W/ HCPCS (ALT 636 FOR OP/ED): Performed by: STUDENT IN AN ORGANIZED HEALTH CARE EDUCATION/TRAINING PROGRAM

## 2024-06-14 RX ORDER — LIDOCAINE HYDROCHLORIDE 10 MG/ML
4 INJECTION INFILTRATION; PERINEURAL
Status: COMPLETED | OUTPATIENT
Start: 2024-06-14 | End: 2024-06-14

## 2024-06-14 RX ORDER — TRIAMCINOLONE ACETONIDE 40 MG/ML
40 INJECTION, SUSPENSION INTRA-ARTICULAR; INTRAMUSCULAR
Status: COMPLETED | OUTPATIENT
Start: 2024-06-14 | End: 2024-06-14

## 2024-06-14 ASSESSMENT — PAIN SCALES - GENERAL: PAINLEVEL_OUTOF10: 4

## 2024-06-14 ASSESSMENT — PAIN DESCRIPTION - DESCRIPTORS: DESCRIPTORS: ACHING;BURNING;SHARP;SHOOTING;SORE

## 2024-06-14 ASSESSMENT — PAIN - FUNCTIONAL ASSESSMENT: PAIN_FUNCTIONAL_ASSESSMENT: 0-10

## 2024-06-14 NOTE — PROGRESS NOTES
ORTHOPAEDIC SURGERY OUTPATIENT PROGRESS NOTE    Chief Complaint:  Bilateral knee     History Of Present Illness  Arin Phan is a 58 y.o. female who presents for evaluation of bilateral knee pain, self-referred.  Patient reports a history of bilateral knee pain that has been ongoing for numerous years.  Pain is typically rated as 4 out of 10 but can worsen to 9 out of 10 depending on activity.  Pain is made worse with walking and standing.  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  Previous consideration has been given for total knee arthroplasty, however due to insurance changes the patient was unable to keep her previous arthroplasty surgeon.  She has previously responded positively to corticosteroid injections.  She has undergone a recent 20 pound weight loss with improvement in her pain.  Patient denies any new numbness, tingling or weakness in the bilateral lower extremities.     Past Medical History  No past medical history on file.    Surgical History  Recent Surgeries in Orthopaedic Surgery            No cases to display             Social History  Social History     Socioeconomic History    Marital status: Single     Spouse name: Not on file    Number of children: Not on file    Years of education: Not on file    Highest education level: Not on file   Occupational History    Not on file   Tobacco Use    Smoking status: Never    Smokeless tobacco: Never   Vaping Use    Vaping status: Not on file   Substance and Sexual Activity    Alcohol use: Never    Drug use: Never    Sexual activity: Not on file   Other Topics Concern    Not on file   Social History Narrative    Not on file     Social Determinants of Health     Financial Resource Strain: Not on file   Food Insecurity: Not on file   Transportation Needs: Not on file   Physical Activity: Not on file   Stress: Not on file   Social Connections: Not on file   Intimate Partner Violence: Not on file   Housing Stability: Not on file        Family History  Family History   Problem Relation Name Age of Onset    Heart disease Mother 81     Coronary artery disease Mother 81     Other (s/p cabg) Mother 81     Hypertension Father 81     Obesity Father 81     Atrial fibrillation Father 81     Heart failure Father 81     Other (CRF) Father 81     Other (CVA) Brother 59         Allergies  Allergies   Allergen Reactions    House Dust Mite Other     congested, stuffy    Mold Other     congested, stuffy       Review of Systems  REVIEW OF SYSTEMS  Constitutional: no unplanned weight loss  Psychiatric: no suicidal ideation  ENT: no vision changes, no sinus problems  Pulmonary: no shortness of breath  Lymphatic: no enlarged lymph nodes  Cardiovascular: no chest pain or shortness of breath  Gastrointestinal: no stomach problems  Genitourinary: no dysuria   Skin: no rashes  Endocrine: no thyroid problems  Neurological: no headache, no numbness  Hematological: no easy bruising  Musculoskeletal: Bilateral knee pain     Physical Exam  PHYSICAL EXAMINATION  Constitutional Exam: well developed and well nourished  Psychiatric Exam: alert and oriented, appropriate mood and behavior  Eye Exam: EOMI  Pulmonary Exam: breathing non-labored, no apparent distress  Lymphatic exam: no appreciable lymphadenopathy in the lower extremities  Cardiovascular exam: RRR to peripheral palpation, DP pulses 2+, PT 2+, toes are pink with good capillary refill, no pitting edema  Skin exam: no open lesions, rashes, abrasions or ulcerations  Neurological exam: sensation to light touch intact in both lower extremities in peripheral and dermatomal distributions (except for any abnormalities noted in musculoskeletal exam)    Musculoskeletal exam: Bilateral lower extremity examination.  Patient pain localized to the bilateral knees, she is focally tender to palpation about the medial joint line on the right and less tender to palpation along the medial joint line on the left.  Patient has  painful knee range of motion from approximately full extension to 100 degrees of flexion with crepitus through the right knee range of motion and pain.  Patient has sensation intact to light touch grossly in a saphenous, sural, superficial peroneal, deep peroneal and tibial nerve distribution.  She has intact knee extension, knee flexion, ankle dorsiflexion and ankle plantarflexion.  She has 2+ DP/PT pulse palpated.  Knee ligamentous examination intact.     Last Recorded Vitals  There were no vitals taken for this visit.    Laboratory Results  No results found for this or any previous visit (from the past 24 hour(s)).     Radiology Results  X-ray imaging 3 view weightbearing bilateral knee reviewed from 06/14/2024 and independently evaluated by me demonstrates obvious tricompartmental osteoarthritis with near complete joint space loss on the right medial compartment.  There is significant patellofemoral osteophytosis noted as well as lateral compartment osteophytes on both sides.    Patient ID: Arin Phan is a 58 y.o. female.    L Inj/Asp on 6/14/2024 5:51 PM  Medications: 4 mL lidocaine 10 mg/mL (1 %); 40 mg triamcinolone acetonide 40 mg/mL    I reviewed the risks and benefits of right knee injection with the patient.  Risks including pain, bleeding, infection, hypopigmentation of the skin, loss of subcutaneous fat, metabolic complications and possibility that further injections may not be effective.  The patient gave informed consent for the procedure.       A time-out was called and confirmed identifying the right knee as the site of injection.  Sterile skin preparation was made over the right knee.  Then, using sterile technique, the right knee was injected with 4 cc of 1% lidocaine and 1cc of kenalog 40 mg with no complications.   The patient was given post-procedure instructions and precautions.  I personally performed the procedure.          L Inj/Asp on 6/14/2024 5:52 PM  Medications: 4 mL lidocaine 10  mg/mL (1 %); 40 mg triamcinolone acetonide 40 mg/mL    I reviewed the risks and benefits of left knee injection with the patient.  Risks including pain, bleeding, infection, hypopigmentation of the skin, loss of subcutaneous fat, metabolic complications and possibility that further injections may not be effective.  The patient gave informed consent for the procedure.       A time-out was called and confirmed identifying the left knee as the site of injection.  Sterile skin preparation was made over the left knee.  Then, using sterile technique, the left knee was injected with 4 cc of 1% lidocaine and 1cc of kenalog 40 mg with no complications.   The patient was given post-procedure instructions and precautions.  I personally performed the procedure.              Assessment/Plan:  58-year-old female who my impression has bilateral symptomatic knee osteoarthritis.  I have reviewed the diagnosis and treatment options extensively with the patient.  I recommend the patient continue weightbearing to her tolerance in her bilateral lower extremities.  We discussed the obvious health benefits of continued weight loss and in particular with respect to her lower extremity joint pain.  I reviewed the diagnostic and therapeutic benefits of bilateral knee corticosteroid injections, please see my separate procedure note.  Patient reported pain relief with the procedure.  I will plan to see the patient back in 3 months for consideration of repeat CSI.  Upon return, patient would not require further imaging.    Arthur William MD, JOSE LUIS  Department of Orthopaedic Surgery  [facility]    The diagnosis and treatment plan were reviewed with the patient. All questions were answered. The patient verbalized understanding of the treatment plan. There were no barriers to understanding identified.    Note dictated with [employer] software.  Completed without full type editing and sent to  avoid delay.

## 2024-06-19 ENCOUNTER — OFFICE VISIT (OUTPATIENT)
Dept: PAIN MEDICINE | Facility: CLINIC | Age: 59
End: 2024-06-19
Payer: MEDICARE

## 2024-06-19 VITALS
WEIGHT: 218 LBS | HEART RATE: 82 BPM | HEIGHT: 64 IN | DIASTOLIC BLOOD PRESSURE: 66 MMHG | RESPIRATION RATE: 18 BRPM | SYSTOLIC BLOOD PRESSURE: 114 MMHG | BODY MASS INDEX: 37.22 KG/M2 | OXYGEN SATURATION: 99 %

## 2024-06-19 DIAGNOSIS — M25.812 IMPINGEMENT OF LEFT SHOULDER: ICD-10-CM

## 2024-06-19 DIAGNOSIS — M70.62 GREATER TROCHANTERIC BURSITIS OF LEFT HIP: ICD-10-CM

## 2024-06-19 DIAGNOSIS — M47.812 ARTHROPATHY OF CERVICAL FACET JOINT: ICD-10-CM

## 2024-06-19 DIAGNOSIS — M54.12 CERVICAL RADICULOPATHY: Primary | ICD-10-CM

## 2024-06-19 PROCEDURE — 1036F TOBACCO NON-USER: CPT | Performed by: STUDENT IN AN ORGANIZED HEALTH CARE EDUCATION/TRAINING PROGRAM

## 2024-06-19 PROCEDURE — 99213 OFFICE O/P EST LOW 20 MIN: CPT | Performed by: STUDENT IN AN ORGANIZED HEALTH CARE EDUCATION/TRAINING PROGRAM

## 2024-06-19 ASSESSMENT — PAIN DESCRIPTION - DESCRIPTORS: DESCRIPTORS: SHARP;ACHING

## 2024-06-19 ASSESSMENT — PAIN SCALES - GENERAL
PAINLEVEL: 2
PAINLEVEL_OUTOF10: 2

## 2024-06-19 ASSESSMENT — PAIN - FUNCTIONAL ASSESSMENT: PAIN_FUNCTIONAL_ASSESSMENT: 0-10

## 2024-06-19 NOTE — PROGRESS NOTES
[facility]  Follow Up Office Visit Note 2024    Patient Information: Arin Phan, MRN: [id number], : 1965   Primary Care/Referring Physician: Alejandrina Hodge MD, [address] [facility] [address] / [facility] [id number]     Chief Complaint: Neck and shoulder pain    Interval History: Mrs. Phan presents today for follow up after C7/T1 MARK and left GTB injection.    Today she reports around 50% improvement from the cervical MARK and 70% pain relief from her left GTB injection.  She is overall pleased with these results, but remains somewhat anxious about if/when this pain will return.    Brief History of Pain: Ms. Arin Phan is a 58 y.o. female with a PMHx of asthma, GAYE, anxiety, depression who presents for left sided neck and shoulder pain.    She describes primarily axial neck pain, worse on the left, which worsens when extending her head, rotating her head to the left.  This pain radiates down to her left shoulder but not further down the arm.  She does describe intermittent tingling in her hands.  She has undergone a C6/7 MARK with Dr. Jones in the past with over 60% pain relief for 3-4 months. She has also been utilizing Celecoxib daily and ran out a few months ago with significant worsening of her pain.     She also describes pain in other areas of her body, including her left shoulder, left hip, and bilateral knees. Was last seen by Dr. Vegas on 23 who performed a left subacromial bursa injection. She is also seeing Dr. Prescott for hip and knee pain (has had injections in both knees and her left hip). Of note, she is planning to undergo surgery on her right foot on 24.     Current Pain Medications: OTC Tylenol  Previously Tried Pain Medications: Celecoxib, Duloxetine    Relevant Surgeries: Denies spine or major orthopedic surgery   Injections: Underwent bilateral subacromial bursa injections, left hip injection, bilateral knee injections, C7/T1 MARK - 50% pain relief, left GTB  injection - 70% pain relief  Physical/Occupational Therapy: Has done PT for this pain in the past    Medications:   Current Outpatient Medications   Medication Instructions    acetaminophen (TylenoL) 325 mg tablet Every 4 hours    albuterol (Ventolin HFA) 90 mcg/actuation inhaler inhalation    aspirin-acetaminophen-caffeine (Excedrin Extra Strength) 250-250-65 mg tablet Every 6 hours    biotin 5 mg capsule Every 24 hours    buPROPion XL (Wellbutrin XL) 150 mg 24 hr tablet TAKE ONE TABLET BY MOUTH DAILY IN THE MORNING ONCE A DAY WITH A 300 MG TABLET    buPROPion XL (Wellbutrin XL) 300 mg 24 hr tablet TAKE ONE TABLET BY MOUTH DAILY IN THE MORNING WITH  MG DOSE    calcium citrate-vitamin D3 500 mg-12.5 mcg (500 unit) tablet,chewable 1 tablet, oral, 2 times daily    cyanocobalamin (Vitamin B-12) 100 mcg tablet 1 tablet, oral, Daily    levocetirizine (Xyzal) 5 mg tablet Every 24 hours    multivit-minerals/folic acid (ADULT ONE DAILY MULTIVITAMIN ORAL) oral, As directed      Allergies:   Allergies   Allergen Reactions    House Dust Mite Other     congested, stuffy    Mold Other     congested, stuffy       Past Medical & Surgical History:  History reviewed. No pertinent past medical history. History reviewed. No pertinent surgical history.    Family History   Problem Relation Name Age of Onset    Heart disease Mother 81     Coronary artery disease Mother 81     Other (s/p cabg) Mother 81     Hypertension Father 81     Obesity Father 81     Atrial fibrillation Father 81     Heart failure Father 81     Other (CRF) Father 81     Other (CVA) Brother 59      Social History     Socioeconomic History    Marital status: Single     Spouse name: Not on file    Number of children: Not on file    Years of education: Not on file    Highest education level: Not on file   Occupational History    Not on file   Tobacco Use    Smoking status: Never    Smokeless tobacco: Never   Vaping Use    Vaping status: Not on file   Substance and  "Sexual Activity    Alcohol use: Never    Drug use: Never    Sexual activity: Not on file   Other Topics Concern    Not on file   Social History Narrative    Not on file     Social Determinants of Health     Financial Resource Strain: Not on file   Food Insecurity: Not on file   Transportation Needs: Not on file   Physical Activity: Not on file   Stress: Not on file   Social Connections: Not on file   Intimate Partner Violence: Not on file   Housing Stability: Not on file       Problems, Past medical history, past surgical history, Medications, allergies, social and family history reviewed and as per the electronic medical record from today's encounter    Review of Systems:  CONST: No fever, chills, fatigue, weight changes  EYES: No loss of vision  ENT: No hearing loss, tinnitus  CV: No chest pain, palpitations  RESP: No dyspnea, shortness of breath, cough  GI: No stool incontinence, nausea, vomiting  : No urinary incontinence  MSK: No joint swelling  SKIN: No rash, no hives  NEURO: No headache, dizziness, weakness  PSYCH: Hx of anxiety and depression  HEM/LYMPH: No easy bruising or bleeding  All other systems reviewed are negative     Physical Exam:  Vitals: /66   Pulse 82   Resp 18   Ht 1.626 m (5' 4\")   Wt 98.9 kg (218 lb)   SpO2 99%   BMI 37.42 kg/m²   General: No apparent distress. Alert, appropriate, oriented x 3. Mood and affect normal. Speaking in full sentences.  HENT: Normocephalic, atraumatic. Hearing intact.  Eyes: Extraocular movements grossly intact. Pupils equal and round.   Neck: Supple, trachea midline.  Lungs: Symmetric respiratory excursion on visual exam, nonlabored breathing.  Extremities: No clubbing, cyanosis, or edema noted in arms or legs.  Skin: No rashes, lesions, alopecia noted on back or extremities.   Neuro: Alert and appropriate. Gait within normal limits. Bulk and tone within normal limits.    Laboratory Data:  The following laboratory data were reviewed during this " "visit:   Lab Results   Component Value Date    WBC 6.3 08/29/2023    RBC 4.06 08/29/2023    HGB 12.8 08/29/2023    HCT 38.8 08/29/2023     08/29/2023      No results found for: \"INR\"  Lab Results   Component Value Date    CREATININE 1.0 08/29/2023    HGBA1C 5.7 03/28/2018       Imaging:  The following imaging impressions were reviewed by me during this visit:    -1/17/22 cervical spine MRI shows C5-6 circumferential disc bulge at this level, mild facet arthrosis and uncovertebral joint spurring bilaterally resulting in bilateral foraminal stenosis, more severe on the right than on the left.  -11/6/23 left shoulder xray shows mild degenerative narrowing and spurring of the acromioclavicular joint space. The glenohumeral joint space overall appears maintained.      I also personally reviewed the images from the above studies myself. These images and my interpretation of them contributed to the management and decision making of the patient's medical plan.    ASSESSMENT:  Ms. Arin Phan is a 58 y.o. female with left neck and shoulder pain, left lateral hip pain that is consistent with:    1. Cervical radiculopathy    2. Greater trochanteric bursitis of left hip    3. Arthropathy of cervical facet joint    4. Impingement of left shoulder        PLAN:    Diagnostics:   -I reviewed her most recent cervical spine MRI (see above for details).  No new diagnostics at this time    Physical Therapy and Rehabilitation:     -Completed physical therapy for her pain in the past, without durable improvement.  She should maintain her current home exercise program    Psychologically:  -No needs at this time    Medications  -No changes to medications today    Duration  - Multiple years    Interventions:  -I suspect her neck and shoulder pain is multifactorial in the setting of cervical radiculopathy, cervical facet arthropathy, and left shoulder impingement.  She has previously undergone a C6/7 MARK with 60% pain relief for 3 " to 4 months.  She underwent a repeat C7/T1 (had to change levels intra-procedure) with 50% pain relief. I would also consider cervical mbb/RF ablation in the future as a means of providing pain relief while avoiding additional steroid use since she is getting steroid injections from multiple different providers.  - Her left lateral hip pain is consistent with greater trochanteric bursitis. She underwent a left GTB injection with 70% pain relief.  Will monitor for duration of pain relief        Sincerely,  Efrem Lyn MD  [facility] [place]

## 2024-07-08 ENCOUNTER — HOSPITAL ENCOUNTER (OUTPATIENT)
Dept: RADIOLOGY | Facility: CLINIC | Age: 59
Discharge: HOME | End: 2024-07-08
Payer: MEDICARE

## 2024-07-08 DIAGNOSIS — M20.11 HALLUX VALGUS (ACQUIRED), RIGHT FOOT: ICD-10-CM

## 2024-07-08 DIAGNOSIS — M25.774 OSTEOPHYTE, RIGHT FOOT: ICD-10-CM

## 2024-09-06 ENCOUNTER — APPOINTMENT (OUTPATIENT)
Dept: ORTHOPEDIC SURGERY | Facility: CLINIC | Age: 59
End: 2024-09-06
Payer: MEDICARE

## 2024-09-26 ENCOUNTER — APPOINTMENT (OUTPATIENT)
Dept: ORTHOPEDIC SURGERY | Facility: HOSPITAL | Age: 59
End: 2024-09-26
Payer: MEDICARE

## 2024-10-01 ENCOUNTER — OFFICE VISIT (OUTPATIENT)
Dept: ORTHOPEDIC SURGERY | Facility: CLINIC | Age: 59
End: 2024-10-01
Payer: MEDICARE

## 2024-10-01 DIAGNOSIS — M25.812 IMPINGEMENT OF LEFT SHOULDER: Primary | ICD-10-CM

## 2024-10-01 DIAGNOSIS — M75.41 IMPINGEMENT SYNDROME, SHOULDER, RIGHT: ICD-10-CM

## 2024-10-01 DIAGNOSIS — M25.811 IMPINGEMENT OF RIGHT SHOULDER: ICD-10-CM

## 2024-10-01 PROCEDURE — 99213 OFFICE O/P EST LOW 20 MIN: CPT | Performed by: ORTHOPAEDIC SURGERY

## 2024-10-01 PROCEDURE — 20611 DRAIN/INJ JOINT/BURSA W/US: CPT | Mod: LT | Performed by: ORTHOPAEDIC SURGERY

## 2024-10-01 PROCEDURE — 20611 DRAIN/INJ JOINT/BURSA W/US: CPT | Mod: RT | Performed by: ORTHOPAEDIC SURGERY

## 2024-10-01 PROCEDURE — 1036F TOBACCO NON-USER: CPT | Performed by: ORTHOPAEDIC SURGERY

## 2024-10-01 PROCEDURE — 2500000004 HC RX 250 GENERAL PHARMACY W/ HCPCS (ALT 636 FOR OP/ED): Performed by: ORTHOPAEDIC SURGERY

## 2024-10-01 RX ORDER — LIDOCAINE HYDROCHLORIDE 10 MG/ML
3 INJECTION, SOLUTION INFILTRATION; PERINEURAL
Status: COMPLETED | OUTPATIENT
Start: 2024-10-01 | End: 2024-10-01

## 2024-10-01 RX ORDER — TRIAMCINOLONE ACETONIDE 40 MG/ML
40 INJECTION, SUSPENSION INTRA-ARTICULAR; INTRAMUSCULAR
Status: COMPLETED | OUTPATIENT
Start: 2024-10-01 | End: 2024-10-01

## 2024-10-01 ASSESSMENT — ENCOUNTER SYMPTOMS
SHORTNESS OF BREATH: 0
WHEEZING: 0
FATIGUE: 0
FEVER: 0
NECK PAIN: 0
NUMBNESS: 0
NECK STIFFNESS: 0
CHILLS: 0
BRUISES/BLEEDS EASILY: 1

## 2024-10-01 ASSESSMENT — PAIN - FUNCTIONAL ASSESSMENT: PAIN_FUNCTIONAL_ASSESSMENT: 0-10

## 2024-10-01 ASSESSMENT — PAIN SCALES - GENERAL: PAINLEVEL_OUTOF10: 8

## 2024-10-01 NOTE — PROGRESS NOTES
Reason for Appointment  Chief Complaint   Patient presents with    Left Shoulder - Pain    Right Shoulder - Pain     History of Present Illness  Patient is a 59 y.o. 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  Both hurt equally depending on the given day does take anti-inflammatories but usually Tylenol    History reviewed. No pertinent past medical history.    History reviewed. No pertinent surgical history.    Medication Documentation Review Audit       Reviewed by Armani Vegas MD (Physician) on 10/01/24 at 0902      Medication Order Taking? Sig Documenting Provider Last Dose Status   acetaminophen (TylenoL) 325 mg tablet [id number] Yes every 4 hours. Historical Provider, MD Taking Active   albuterol (Ventolin HFA) 90 mcg/actuation inhaler [id number] Yes Inhale. Historical Provider, MD Taking Active   aspirin-acetaminophen-caffeine (Excedrin Extra Strength) 250-250-65 mg tablet [id number] Yes every 6 hours. Historical Provider, MD Taking Active   biotin 5 mg capsule [id number] Yes once every 24 hours. Historical Provider, MD Taking Active   buPROPion XL (Wellbutrin XL) 150 mg 24 hr tablet [id number] Yes TAKE ONE TABLET BY MOUTH DAILY IN THE MORNING ONCE A DAY WITH A 300 MG TABLET Zak Shields DO Taking Active   buPROPion XL (Wellbutrin XL) 300 mg 24 hr tablet [id number] Yes TAKE ONE TABLET BY MOUTH DAILY IN THE MORNING WITH  MG DOSE Zak Shields DO Taking Active   calcium citrate-vitamin D3  500 mg-12.5 mcg (500 unit) tablet,chewable [id number] Yes Chew 1 tablet 2 times a day. Historical Provider, MD Taking Active   cyanocobalamin (Vitamin B-12) 100 mcg tablet [id number] Yes Take 1 tablet (100 mcg) by mouth once daily. Historical Provider, MD Taking Active   levocetirizine (Xyzal) 5 mg tablet [id number] Yes once every 24 hours. Historical Provider, MD Taking Active   multivit-minerals/folic acid (ADULT ONE DAILY MULTIVITAMIN ORAL) [id number] Yes Take by mouth. As directed Historical Provider, MD Taking Active                    Allergies   Allergen Reactions    House Dust Mite Other     congested, stuffy    Mold Other     congested, stuffy       Review of Systems   Constitutional:  Negative for chills, fatigue and fever.   Respiratory:  Negative for shortness of breath and wheezing.    Cardiovascular:  Negative for chest pain and leg swelling.   Musculoskeletal:  Negative for neck pain and neck stiffness.   Skin: Negative.    Allergic/Immunologic: Positive for immunocompromised state.   Neurological:  Negative for numbness.   Hematological:  Bruises/bleeds easily.       Exam   Exam reveals near full shoulder range of motion but a markedly positive impingement sign today slightly more weakness in external rotation on the left than the right but good deltoid function no significant neck tenderness good pulses good sensation both upper extremities  Assessment   Bilateral shoulder impingement bilateral subacromial bursitis  Plan     We talked about the risk benefits and alternatives of repeated injection but still this is the safest way to go at this juncture with her shots lasting so long we sterilely injected both subacromial spaces today patient understand small risk infection signs look for I will see her back on an as-needed basisPatient ID: Arin Phan is a 59 y.o. female.    L Inj/Asp: R subacromial bursa on 10/1/2024 9:04 AM  Indications: pain  Details: 22 G needle, ultrasound-guided  Medications:  40 mg triamcinolone acetonide 40 mg/mL; 3 mL lidocaine 10 mg/mL (1 %)  Outcome: tolerated well, no immediate complications  Procedure, treatment alternatives, risks and benefits explained, specific risks discussed. Consent was given by the patient. Immediately prior to procedure a time out was called to verify the correct patient, procedure, equipment, support staff and site/side marked as required. Patient was prepped and draped in the usual sterile fashion.       L Inj/Asp: L subacromial bursa on 10/1/2024 9:04 AM  Indications: pain  Details: 22 G needle, ultrasound-guided  Medications: 40 mg triamcinolone acetonide 40 mg/mL; 3 mL lidocaine 10 mg/mL (1 %)  Outcome: tolerated well, no immediate complications  Procedure, treatment alternatives, risks and benefits explained, specific risks discussed. Consent was given by the patient. Immediately prior to procedure a time out was called to verify the correct patient, procedure, equipment, support staff and site/side marked as required. Patient was prepped and draped in the usual sterile fashion.

## 2024-10-18 ENCOUNTER — OFFICE VISIT (OUTPATIENT)
Dept: PRIMARY CARE | Facility: CLINIC | Age: 59
End: 2024-10-18
Payer: MEDICARE

## 2024-10-18 VITALS
HEIGHT: 64 IN | BODY MASS INDEX: 38.31 KG/M2 | DIASTOLIC BLOOD PRESSURE: 82 MMHG | OXYGEN SATURATION: 100 % | WEIGHT: 224.4 LBS | SYSTOLIC BLOOD PRESSURE: 124 MMHG | TEMPERATURE: 97.7 F | RESPIRATION RATE: 16 BRPM | HEART RATE: 61 BPM

## 2024-10-18 DIAGNOSIS — R42 VERTIGO: ICD-10-CM

## 2024-10-18 DIAGNOSIS — R51.9 FREQUENT HEADACHES: ICD-10-CM

## 2024-10-18 DIAGNOSIS — E66.812 CLASS 2 OBESITY DUE TO EXCESS CALORIES WITH BODY MASS INDEX (BMI) OF 38.0 TO 38.9 IN ADULT, UNSPECIFIED WHETHER SERIOUS COMORBIDITY PRESENT: ICD-10-CM

## 2024-10-18 DIAGNOSIS — Z12.31 BREAST CANCER SCREENING BY MAMMOGRAM: Primary | ICD-10-CM

## 2024-10-18 DIAGNOSIS — E66.09 CLASS 2 OBESITY DUE TO EXCESS CALORIES WITH BODY MASS INDEX (BMI) OF 38.0 TO 38.9 IN ADULT, UNSPECIFIED WHETHER SERIOUS COMORBIDITY PRESENT: ICD-10-CM

## 2024-10-18 DIAGNOSIS — R23.3 EASY BRUISING: ICD-10-CM

## 2024-10-18 DIAGNOSIS — Z23 ENCOUNTER FOR IMMUNIZATION: ICD-10-CM

## 2024-10-18 DIAGNOSIS — Z00.00 ROUTINE HEALTH MAINTENANCE: ICD-10-CM

## 2024-10-18 PROCEDURE — 90656 IIV3 VACC NO PRSV 0.5 ML IM: CPT | Performed by: FAMILY MEDICINE

## 2024-10-18 PROCEDURE — 99214 OFFICE O/P EST MOD 30 MIN: CPT | Performed by: FAMILY MEDICINE

## 2024-10-18 PROCEDURE — 3008F BODY MASS INDEX DOCD: CPT | Performed by: FAMILY MEDICINE

## 2024-10-18 PROCEDURE — 90471 IMMUNIZATION ADMIN: CPT | Performed by: FAMILY MEDICINE

## 2024-10-18 RX ORDER — SUMATRIPTAN 50 MG/1
50 TABLET, FILM COATED ORAL ONCE AS NEEDED
Qty: 27 TABLET | Refills: 3 | Status: SHIPPED | OUTPATIENT
Start: 2024-10-18 | End: 2025-10-18

## 2024-10-18 RX ORDER — TOPIRAMATE 25 MG/1
25 CAPSULE, EXTENDED RELEASE ORAL DAILY
Qty: 30 CAPSULE | Refills: 1 | Status: SHIPPED | OUTPATIENT
Start: 2024-10-18 | End: 2024-10-24

## 2024-10-18 ASSESSMENT — ENCOUNTER SYMPTOMS
DEPRESSION: 0
OCCASIONAL FEELINGS OF UNSTEADINESS: 0
LOSS OF SENSATION IN FEET: 0

## 2024-10-18 ASSESSMENT — PAIN SCALES - GENERAL: PAINLEVEL_OUTOF10: 0-NO PAIN

## 2024-10-18 ASSESSMENT — PATIENT HEALTH QUESTIONNAIRE - PHQ9
2. FEELING DOWN, DEPRESSED OR HOPELESS: NOT AT ALL
1. LITTLE INTEREST OR PLEASURE IN DOING THINGS: NOT AT ALL
SUM OF ALL RESPONSES TO PHQ9 QUESTIONS 1 & 2: 0

## 2024-10-18 ASSESSMENT — COLUMBIA-SUICIDE SEVERITY RATING SCALE - C-SSRS
1. IN THE PAST MONTH, HAVE YOU WISHED YOU WERE DEAD OR WISHED YOU COULD GO TO SLEEP AND NOT WAKE UP?: NO
6. HAVE YOU EVER DONE ANYTHING, STARTED TO DO ANYTHING, OR PREPARED TO DO ANYTHING TO END YOUR LIFE?: NO
2. HAVE YOU ACTUALLY HAD ANY THOUGHTS OF KILLING YOURSELF?: NO

## 2024-10-18 NOTE — PATIENT INSTRUCTIONS
Problem List Items Addressed This Visit             ICD-10-CM    Obesity, unspecified E66.9    Relevant Orders    Hemoglobin A1c    Insulin, Fasting     Other Visit Diagnoses         Codes    Breast cancer screening by mammogram    -  Primary Z12.31    Relevant Orders    BI mammo bilateral screening tomosynthesis    Frequent headaches     R51.9    Relevant Medications    SUMAtriptan (Imitrex) 50 mg tablet    topiramate 25 mg capsule,extended release 24hr    Other Relevant Orders    Comprehensive Metabolic Panel    Lipid Panel    CBC    TSH with reflex to Free T4 if abnormal    Hemoglobin A1c    Insulin, Fasting    Routine health maintenance     Z00.00    Relevant Orders    Comprehensive Metabolic Panel    Lipid Panel    CBC    TSH with reflex to Free T4 if abnormal    Hemoglobin A1c    Insulin, Fasting            Additional Visit Plans:  - History and physical exam is reassuring with findings suggestive of migraines  - Treatment includes supportive care with avoidance of triggers and the use of medications to treat the acute attack or prevent future attacks  - Acute treatment of headaches is most effective if medication is used early when the headache is mild, medications include NSAIDs such as Ibuprofen or Aspirin  - For a mild headache/migraine, take 800 mg Ibuprofen at symptom onset, then 400-800 mg ever 4-6 hours as needed (no more than 2400 mg/day). OR take 900-1000 mg Aspirin at symptom onset, then 325-975 mg ever 4-6 hours as needed (no more than 4000 mg/day).  - If you are not able to take NSAIDs, you can consider taking Acetaminophen. 325-1000 mg every 4-6 hours as needed (no more than 4000 mg/day). Caution is advised, as excessive use of this can cause the medication-overuse headache.  - Dehydration is a trigger for headache attacks, so it is recommended you stay well-hydrated when possible  - For a more severe migraine, early treatment with a Triptan can be effective and lower the chance of a recurrent  headache. May consider Sumatriptan with  mg orally at migraine onset, may repeat one time 2 hours later if needed (no more than 200 mg/day)   - I encourage lifestyle changes that may help to avoid triggering of migraines: regular meals, good sleep hygiene, good hydration, regular exercise, and identify and avoid specific migraine triggers  - I recommend you use something like the Migraine Pk dhruv that is available on your phone to keep a log of your headaches in an effort to identify potential triggers and monitor the frequency / severity by which they occur  - We should consider a daily preventative treatment. Plan to take topiramate XR 25mg daily every day to prevent headaches.     PLEASE CALL ME IN 4 WEEKS AND TELL ME HOW YOUR HEADACHES HAVE CHANGED AND HOW YOU ARE FEELING.     If we cannot get the headaches better I want you to consider a repeat sleep study. Sleep apnea can cause daily headaches. We will do labs to look for other sources.       Next Wellness Exam/Annual Physical Due  Please return at your earliest convenience for complete physical.  The complete physical allows us to review appropriate cancer screenings, routine blood work and immunizations.  Will order routine annual blood work in preparation for your physical     Patient Care Team:  Jimenez Haines DO as PCP - General (Family Medicine)  Alejandrina Hodge MD as PCP - [facility] PCP  MD Alejandrina Hansen MD as Primary Care Provider    Jimenez Haines DO   10/18/24   4:16 PM

## 2024-10-18 NOTE — PROGRESS NOTES
Outpatient Visit Note    Chief Complaint   Patient presents with    Headache    Dizziness       HPI:  Arin Phan is a 59 y.o. female here to establish care with concerns regarding headache and dizziness.    Previous PCP is Dr. Hodge.     She is following closely with Dr. Vegas for impingement of her left and right shoulder.  She has bursitis symptoms.  Works as a hairdresser.  She does also follow closely with pain management for cervical radiculopathy.    Today she states that she needs a mammogram order.     She gets frequent headaches, takes excedrin for this. Tries to wait it out before taking an excedrin. Headaches are DAILY. Frontal headaches - mostly the right side.     Does get dizzy and lightheaded and sweaty sometimes randomly. Gets dizziness when she turns her head to the left or looks up in the shower.     Getting lots of bruises randomly.     Has trouble with her weight.    Has insomnia. Up every few hours. This causes her to eat out of boredom. She does not snore. Does not wake up feeling rested. Did a sleep study 8 years ago. Mild GAYE. Previously on CPAP but did not like it. Highest weight was 323 lbs.     A lot of family members have chronic headaches.     PHQ9/GAD7:         Patient Active Problem List    Diagnosis Date Noted    Obesity, unspecified 01/02/2024    Arthritis of left acromioclavicular joint 11/02/2023    Asthma 11/02/2023    Cervical nerve root impingement 11/02/2023    Chronic rhinitis 11/02/2023    DDD (degenerative disc disease), cervical 11/02/2023    Spondylosis without myelopathy or radiculopathy, cervical region 11/02/2023    Depression with anxiety 11/02/2023    Grief 11/02/2023    Intestinal malabsorption 11/02/2023    Hyperparathyroidism (Multi) 11/02/2023    Mild intermittent asthma without complication (HHS-HCC) 11/02/2023    Mild persistent asthma without complication (HHS-HCC) 11/02/2023    Morbid (severe) obesity due to excess calories (Multi) 11/02/2023     Obstructive sleep apnea syndrome 11/02/2023    Ocular migraine 11/02/2023    Other chronic pain 11/02/2023    Other headache syndrome 11/02/2023    Primary osteoarthritis, right shoulder 11/02/2023    Overflow incontinence 11/02/2023    Stress incontinence (female) (male) 11/02/2023    Tinnitus of both ears 11/02/2023    Vitamin D deficiency 11/02/2023    Sleep apnea 12/08/2016    Eating disorder 10/10/2016    Psychological stress 10/10/2016    Restless leg 08/10/2016    Radial tunnel syndrome 03/23/2015    Osteoarthritis of knee 02/17/2015    Primary osteoarthritis of right knee 02/17/2015    Low back pain 07/09/2003    Osteoarthritis 07/09/2003        History reviewed. No pertinent past medical history.     Current Medications  Current Outpatient Medications   Medication Instructions    acetaminophen (TylenoL) 325 mg tablet Every 4 hours    albuterol (Ventolin HFA) 90 mcg/actuation inhaler Inhale.    calcium citrate-vitamin D3 500 mg-12.5 mcg (500 unit) tablet,chewable 1 tablet, 2 times daily    cyanocobalamin (Vitamin B-12) 100 mcg tablet 1 tablet, Daily    multivit-minerals/folic acid (ADULT ONE DAILY MULTIVITAMIN ORAL) Take by mouth. As directed    SUMAtriptan (IMITREX) 50 mg, oral, Once as needed, May repeat after 2 hours.    topiramate 25 mg, oral, Daily        Allergies  Allergies   Allergen Reactions    House Dust Mite Other     congested, stuffy    Mold Other     congested, stuffy        History reviewed. No pertinent surgical history.  Family History   Problem Relation Name Age of Onset    Heart disease Mother 81     Coronary artery disease Mother 81     Other (s/p cabg) Mother 81     Hypertension Father 81     Obesity Father 81     Atrial fibrillation Father 81     Heart failure Father 81     Other (CRF) Father 81     Other (CVA) Brother 59      Social History     Tobacco Use    Smoking status: Never    Smokeless tobacco: Never   Substance Use Topics    Alcohol use: Never    Drug use: Never      Tobacco Use: Low Risk  (10/18/2024)    Patient History     Smoking Tobacco Use: Never     Smokeless Tobacco Use: Never     Passive Exposure: Not on file        ROS  All pertinent positive symptoms are included in the history of present illness.  All other systems have been reviewed and are negative and noncontributory to this patient's current ailments.    VITAL SIGNS  Vitals:    10/18/24 1530   BP: 124/82   Pulse: 61   Resp: 16   Temp: 36.5 °C (97.7 °F)   SpO2: 100%     Vitals:    10/18/24 1530   Weight: 102 kg (224 lb 6.4 oz)      Body mass index is 38.52 kg/m².     PHYSICAL EXAM  GENERAL APPEARANCE: well nourished, well developed, looks like stated age, in no acute distress, not ill or tired appearing, conversing well.   HEENT: no trauma, normocephalic.   NECK: no nodes, supple without rigidity, no neck mass was observed,  no thyromegaly.   HEART: regular rate and rhythm, S1 and S2 heard with no murmurs or skipped beats. No carotid bruits.  LUNGS: clear to auscultation bilaterally with no wheezes, crackles or rales.   EXTREMITIES: moving all extremities equally with no edema or deformities.   SKIN: normal skin color and pigmentation, normal skin turgor without rash, lesions, or nodules visualized.   NEUROLOGIC EXAM: CN II-XII grossly intact, normal gait, normal balance.   PSYCH: mood and affect appropriate; alert and oriented to time, place, person; no difficulty with speech or language.       Counseling:       Medication education:           Education:  The patient is counseled regarding potential side-effects of all new medications          Understanding:  Patient expressed understanding of information conveyed today          Adherence:  No barriers to adherence identified     Assessment/Plan   Problem List Items Addressed This Visit             ICD-10-CM    Obesity, unspecified E66.9    Relevant Orders    Hemoglobin A1c    Insulin, Fasting     Other Visit Diagnoses         Codes    Breast cancer screening  by mammogram    -  Primary Z12.31    Relevant Orders    BI mammo bilateral screening tomosynthesis    Frequent headaches     R51.9    Relevant Medications    SUMAtriptan (Imitrex) 50 mg tablet    topiramate 25 mg capsule,extended release 24hr    Other Relevant Orders    Comprehensive Metabolic Panel    Lipid Panel    CBC    TSH with reflex to Free T4 if abnormal    Hemoglobin A1c    Insulin, Fasting    Routine health maintenance     Z00.00    Relevant Orders    Comprehensive Metabolic Panel    Lipid Panel    CBC    TSH with reflex to Free T4 if abnormal    Hemoglobin A1c    Insulin, Fasting            Additional Visit Plans:  - History and physical exam is reassuring with findings suggestive of migraines  - Treatment includes supportive care with avoidance of triggers and the use of medications to treat the acute attack or prevent future attacks  - Acute treatment of headaches is most effective if medication is used early when the headache is mild, medications include NSAIDs such as Ibuprofen or Aspirin  - For a mild headache/migraine, take 800 mg Ibuprofen at symptom onset, then 400-800 mg ever 4-6 hours as needed (no more than 2400 mg/day). OR take 900-1000 mg Aspirin at symptom onset, then 325-975 mg ever 4-6 hours as needed (no more than 4000 mg/day).  - If you are not able to take NSAIDs, you can consider taking Acetaminophen. 325-1000 mg every 4-6 hours as needed (no more than 4000 mg/day). Caution is advised, as excessive use of this can cause the medication-overuse headache.  - Dehydration is a trigger for headache attacks, so it is recommended you stay well-hydrated when possible  - For a more severe migraine, early treatment with a Triptan can be effective and lower the chance of a recurrent headache. May consider Sumatriptan with  mg orally at migraine onset, may repeat one time 2 hours later if needed (no more than 200 mg/day)   - I encourage lifestyle changes that may help to avoid triggering of  migraines: regular meals, good sleep hygiene, good hydration, regular exercise, and identify and avoid specific migraine triggers  - I recommend you use something like the Migraine Pk dhruv that is available on your phone to keep a log of your headaches in an effort to identify potential triggers and monitor the frequency / severity by which they occur  - We should consider a daily preventative treatment. Plan to take topiramate XR 25mg daily every day to prevent headaches.     PLEASE CALL ME IN 4 WEEKS AND TELL ME HOW YOUR HEADACHES HAVE CHANGED AND HOW YOU ARE FEELING.     If we cannot get the headaches better I want you to consider a repeat sleep study. Sleep apnea can cause daily headaches. We will do labs to look for other sources.     Plan  for vestibular rehab for what sounds like vertigo.  Will do additional testing to look into sources of bruising.    Next Wellness Exam/Annual Physical Due  Please return at your earliest convenience for complete physical.  The complete physical allows us to review appropriate cancer screenings, routine blood work and immunizations.  Will order routine annual blood work in preparation for your physical     Patient Care Team:  Jimenez Haines DO as PCP - General (Family Medicine)  Alejandrina Hodge MD as PCP - [facility] PCP  MD Alejandrina Hansen MD as Primary Care Provider    Jimenez Haines DO   10/18/24   4:16 PM

## 2024-10-23 ENCOUNTER — HOSPITAL ENCOUNTER (OUTPATIENT)
Dept: RADIOLOGY | Facility: HOSPITAL | Age: 59
Discharge: HOME | End: 2024-10-23
Payer: MEDICARE

## 2024-10-23 ENCOUNTER — LAB (OUTPATIENT)
Dept: LAB | Facility: LAB | Age: 59
End: 2024-10-23
Payer: MEDICARE

## 2024-10-23 VITALS — HEIGHT: 64 IN | BODY MASS INDEX: 38.24 KG/M2 | WEIGHT: 224 LBS

## 2024-10-23 DIAGNOSIS — R51.9 FREQUENT HEADACHES: ICD-10-CM

## 2024-10-23 DIAGNOSIS — E66.812 CLASS 2 OBESITY DUE TO EXCESS CALORIES WITH BODY MASS INDEX (BMI) OF 38.0 TO 38.9 IN ADULT, UNSPECIFIED WHETHER SERIOUS COMORBIDITY PRESENT: ICD-10-CM

## 2024-10-23 DIAGNOSIS — R23.3 EASY BRUISING: ICD-10-CM

## 2024-10-23 DIAGNOSIS — E66.09 CLASS 2 OBESITY DUE TO EXCESS CALORIES WITH BODY MASS INDEX (BMI) OF 38.0 TO 38.9 IN ADULT, UNSPECIFIED WHETHER SERIOUS COMORBIDITY PRESENT: ICD-10-CM

## 2024-10-23 DIAGNOSIS — Z00.00 ROUTINE HEALTH MAINTENANCE: ICD-10-CM

## 2024-10-23 DIAGNOSIS — Z12.31 BREAST CANCER SCREENING BY MAMMOGRAM: ICD-10-CM

## 2024-10-23 LAB
ALBUMIN SERPL BCP-MCNC: 3.5 G/DL (ref 3.4–5)
ALP SERPL-CCNC: 74 U/L (ref 33–110)
ALT SERPL W P-5'-P-CCNC: 26 U/L (ref 7–45)
ANION GAP SERPL CALC-SCNC: 12 MMOL/L (ref 10–20)
AST SERPL W P-5'-P-CCNC: 24 U/L (ref 9–39)
BILIRUB SERPL-MCNC: 0.4 MG/DL (ref 0–1.2)
BUN SERPL-MCNC: 17 MG/DL (ref 6–23)
CALCIUM SERPL-MCNC: 8.6 MG/DL (ref 8.6–10.3)
CHLORIDE SERPL-SCNC: 104 MMOL/L (ref 98–107)
CHOLEST SERPL-MCNC: 201 MG/DL (ref 0–199)
CHOLESTEROL/HDL RATIO: 2.5
CO2 SERPL-SCNC: 29 MMOL/L (ref 21–32)
CREAT SERPL-MCNC: 0.71 MG/DL (ref 0.5–1.05)
EGFRCR SERPLBLD CKD-EPI 2021: >90 ML/MIN/1.73M*2
ERYTHROCYTE [DISTWIDTH] IN BLOOD BY AUTOMATED COUNT: 13.2 % (ref 11.5–14.5)
EST. AVERAGE GLUCOSE BLD GHB EST-MCNC: 117 MG/DL
GLUCOSE SERPL-MCNC: 84 MG/DL (ref 74–99)
HBA1C MFR BLD: 5.7 %
HCT VFR BLD AUTO: 39.8 % (ref 36–46)
HDLC SERPL-MCNC: 81 MG/DL
HGB BLD-MCNC: 12.7 G/DL (ref 12–16)
INR PPP: 0.9 (ref 0.9–1.1)
INSULIN P FAST SERPL-ACNC: 10 UIU/ML (ref 3–25)
LDLC SERPL CALC-MCNC: 107 MG/DL
MCH RBC QN AUTO: 30.5 PG (ref 26–34)
MCHC RBC AUTO-ENTMCNC: 31.9 G/DL (ref 32–36)
MCV RBC AUTO: 96 FL (ref 80–100)
NON HDL CHOLESTEROL: 120 MG/DL (ref 0–149)
NRBC BLD-RTO: 0 /100 WBCS (ref 0–0)
PLATELET # BLD AUTO: 229 X10*3/UL (ref 150–450)
POTASSIUM SERPL-SCNC: 4 MMOL/L (ref 3.5–5.3)
PROT SERPL-MCNC: 6 G/DL (ref 6.4–8.2)
PROTHROMBIN TIME: 9.9 SECONDS (ref 9.8–12.8)
RBC # BLD AUTO: 4.16 X10*6/UL (ref 4–5.2)
SODIUM SERPL-SCNC: 141 MMOL/L (ref 136–145)
TRIGL SERPL-MCNC: 64 MG/DL (ref 0–149)
TSH SERPL-ACNC: 1.61 MIU/L (ref 0.44–3.98)
VLDL: 13 MG/DL (ref 0–40)
WBC # BLD AUTO: 5.2 X10*3/UL (ref 4.4–11.3)

## 2024-10-23 PROCEDURE — 80053 COMPREHEN METABOLIC PANEL: CPT

## 2024-10-23 PROCEDURE — 80061 LIPID PANEL: CPT

## 2024-10-23 PROCEDURE — 84443 ASSAY THYROID STIM HORMONE: CPT

## 2024-10-23 PROCEDURE — 77063 BREAST TOMOSYNTHESIS BI: CPT | Performed by: RADIOLOGY

## 2024-10-23 PROCEDURE — 83036 HEMOGLOBIN GLYCOSYLATED A1C: CPT

## 2024-10-23 PROCEDURE — 77067 SCR MAMMO BI INCL CAD: CPT

## 2024-10-23 PROCEDURE — 36415 COLL VENOUS BLD VENIPUNCTURE: CPT

## 2024-10-23 PROCEDURE — 85610 PROTHROMBIN TIME: CPT

## 2024-10-23 PROCEDURE — 83525 ASSAY OF INSULIN: CPT

## 2024-10-23 PROCEDURE — 85027 COMPLETE CBC AUTOMATED: CPT

## 2024-10-23 PROCEDURE — 77067 SCR MAMMO BI INCL CAD: CPT | Performed by: RADIOLOGY

## 2024-10-24 ENCOUNTER — TELEPHONE (OUTPATIENT)
Dept: PRIMARY CARE | Facility: CLINIC | Age: 59
End: 2024-10-24
Payer: MEDICARE

## 2024-10-24 DIAGNOSIS — R51.9 FREQUENT HEADACHES: Primary | ICD-10-CM

## 2024-10-24 RX ORDER — TOPIRAMATE 25 MG/1
25 TABLET ORAL 2 TIMES DAILY
Qty: 60 TABLET | Refills: 2 | Status: SHIPPED | OUTPATIENT
Start: 2024-10-24 | End: 2025-01-22

## 2024-11-15 ENCOUNTER — APPOINTMENT (OUTPATIENT)
Dept: PRIMARY CARE | Facility: CLINIC | Age: 59
End: 2024-11-15
Payer: MEDICARE

## 2024-11-20 ENCOUNTER — OFFICE VISIT (OUTPATIENT)
Dept: PAIN MEDICINE | Facility: CLINIC | Age: 59
End: 2024-11-20
Payer: MEDICARE

## 2024-11-20 ENCOUNTER — CLINICAL SUPPORT (OUTPATIENT)
Dept: PHYSICAL THERAPY | Facility: CLINIC | Age: 59
End: 2024-11-20
Payer: MEDICARE

## 2024-11-20 VITALS
DIASTOLIC BLOOD PRESSURE: 82 MMHG | WEIGHT: 224 LBS | SYSTOLIC BLOOD PRESSURE: 126 MMHG | HEIGHT: 64 IN | BODY MASS INDEX: 38.24 KG/M2 | HEART RATE: 77 BPM | RESPIRATION RATE: 20 BRPM | OXYGEN SATURATION: 97 %

## 2024-11-20 DIAGNOSIS — M70.62 GREATER TROCHANTERIC BURSITIS OF LEFT HIP: Primary | ICD-10-CM

## 2024-11-20 DIAGNOSIS — H81.13 BENIGN PAROXYSMAL POSITIONAL VERTIGO DUE TO BILATERAL VESTIBULAR DISORDER: Primary | ICD-10-CM

## 2024-11-20 DIAGNOSIS — M54.12 CERVICAL RADICULOPATHY: ICD-10-CM

## 2024-11-20 DIAGNOSIS — R42 VERTIGO: ICD-10-CM

## 2024-11-20 DIAGNOSIS — M79.18 MYOFASCIAL PAIN: ICD-10-CM

## 2024-11-20 PROCEDURE — 99417 PROLNG OP E/M EACH 15 MIN: CPT | Performed by: STUDENT IN AN ORGANIZED HEALTH CARE EDUCATION/TRAINING PROGRAM

## 2024-11-20 PROCEDURE — 97161 PT EVAL LOW COMPLEX 20 MIN: CPT | Mod: GP | Performed by: PHYSICAL THERAPIST

## 2024-11-20 PROCEDURE — 1036F TOBACCO NON-USER: CPT | Performed by: STUDENT IN AN ORGANIZED HEALTH CARE EDUCATION/TRAINING PROGRAM

## 2024-11-20 PROCEDURE — 3008F BODY MASS INDEX DOCD: CPT | Performed by: STUDENT IN AN ORGANIZED HEALTH CARE EDUCATION/TRAINING PROGRAM

## 2024-11-20 PROCEDURE — 99214 OFFICE O/P EST MOD 30 MIN: CPT | Performed by: STUDENT IN AN ORGANIZED HEALTH CARE EDUCATION/TRAINING PROGRAM

## 2024-11-20 RX ORDER — DULOXETIN HYDROCHLORIDE 30 MG/1
CAPSULE, DELAYED RELEASE ORAL
Qty: 60 CAPSULE | Refills: 1 | Status: SHIPPED | OUTPATIENT
Start: 2024-11-20

## 2024-11-20 SDOH — ECONOMIC STABILITY: GENERAL: QUALITY OF LIFE: GOOD

## 2024-11-20 ASSESSMENT — ENCOUNTER SYMPTOMS
QUALITY: DISCOMFORT
DEPRESSION: 0
PAIN SCALE AT LOWEST: 1
OCCASIONAL FEELINGS OF UNSTEADINESS: 0
LOSS OF SENSATION IN FEET: 0
PAIN SCALE AT HIGHEST: 5
PAIN LOCATION: CERVICAL
PAIN SCALE: 2

## 2024-11-20 ASSESSMENT — PAIN - FUNCTIONAL ASSESSMENT: PAIN_FUNCTIONAL_ASSESSMENT: 0-10

## 2024-11-20 ASSESSMENT — ACTIVITIES OF DAILY LIVING (ADL): POOR_BALANCE: 1

## 2024-11-20 ASSESSMENT — PAIN SCALES - GENERAL
PAINLEVEL_OUTOF10: 4
PAINLEVEL_OUTOF10: 4

## 2024-11-20 ASSESSMENT — PAIN DESCRIPTION - DESCRIPTORS: DESCRIPTORS: ACHING

## 2024-11-20 NOTE — H&P (VIEW-ONLY)
"[facility] Pain Management  Follow Up Office Visit Note 2024    Patient Information: Arin Phan, MRN: [id number], : 1965   Primary Care/Referring Physician: Jimenez Haines DO, [address] / [facility] [id number]     Chief Complaint: Left lateral hip pain    Interval History: Mrs. Phan presents today for follow up. At her last visit no changes were made    Today she reports doing slightly worse since last seen. She feels like she is \"walking funny\" recently and her left lateral hip has been bothering her more. She is interested in repeating the left GTB injection which provided 70% relief of this pain last time.     Brief History of Pain: Ms. Arin Phan is a 59 y.o. female with a PMHx of asthma, GAYE, anxiety, depression, hx of gastric sleeve who presents for left sided neck and shoulder pain.    She describes primarily axial neck pain, worse on the left, which worsens when extending her head, rotating her head to the left.  This pain radiates down to her left shoulder but not further down the arm.  She does describe intermittent tingling in her hands.  She has undergone a C6/7 MARK with Dr. Jones in the past with over 60% pain relief for 3-4 months. She has also been utilizing Celecoxib daily and ran out a few months ago with significant worsening of her pain.     She also describes pain in other areas of her body, including her left shoulder, left hip, and bilateral knees. Was last seen by Dr. Vegas on 23 who performed a left subacromial bursa injection. She is also seeing Dr. Prescott for hip and knee pain (has had injections in both knees and her left hip). Of note, she is planning to undergo surgery on her right foot on 24.     Current Pain Medications: OTC Tylenol  Previously Tried Pain Medications: Celecoxib, Duloxetine - not sure why this was stopped, denies side effects    Relevant Surgeries: Denies spine or major orthopedic surgery   Injections: Underwent bilateral " subacromial bursa injections, left hip injection, bilateral knee injections, C7/T1 MARK - 50% pain relief, left GTB injection - 70% pain relief  Physical/Occupational Therapy: Has done PT for this pain in the past    Medications:   Current Outpatient Medications   Medication Instructions    acetaminophen (TylenoL) 325 mg tablet Every 4 hours    albuterol (Ventolin HFA) 90 mcg/actuation inhaler Inhale.    calcium citrate-vitamin D3 500 mg-12.5 mcg (500 unit) tablet,chewable 1 tablet, 2 times daily    cyanocobalamin (Vitamin B-12) 100 mcg tablet 1 tablet, Daily    DULoxetine (Cymbalta) 30 mg DR capsule Take 1 capsule by mouth daily for 2 weeks. If tolerating increase to 2 capsules by mouth once a day. Do not crush or chew.    gabapentin (Neurontin) 300 mg capsule TAKE ONE CAPSULE BY MOUTH TWO TIMES A DAY FOR 2 WEEKS    Lidocaine Pain Relief 4 % patch APPLY ONE PATCH EXTERNALLY DAILY AS DIRECTED AS NEEDED    multivit-minerals/folic acid (ADULT ONE DAILY MULTIVITAMIN ORAL) Take by mouth. As directed    ondansetron (Zofran) 4 mg tablet Take by mouth.    SUMAtriptan (IMITREX) 50 mg, oral, Once as needed, May repeat after 2 hours.    topiramate (TOPAMAX) 25 mg, oral, 2 times daily      Allergies:   Allergies   Allergen Reactions    House Dust Mite Other     congested, stuffy    Mold Other     congested, stuffy       Past Medical & Surgical History:  History reviewed. No pertinent past medical history. History reviewed. No pertinent surgical history.    Family History   Problem Relation Name Age of Onset    Heart disease Mother 81     Coronary artery disease Mother 81     Other (s/p cabg) Mother 81     Hypertension Father 81     Obesity Father 81     Atrial fibrillation Father 81     Heart failure Father 81     Other (CRF) Father 81     Other (CVA) Brother 59     Breast cancer Maternal Grandmother      Breast cancer Paternal Grandmother       Social History     Socioeconomic History    Marital status: Single     Spouse name:  "Not on file    Number of children: Not on file    Years of education: Not on file    Highest education level: Not on file   Occupational History    Not on file   Tobacco Use    Smoking status: Never    Smokeless tobacco: Never   Vaping Use    Vaping status: Not on file   Substance and Sexual Activity    Alcohol use: Never    Drug use: Never    Sexual activity: Defer   Other Topics Concern    Not on file   Social History Narrative    Not on file     Social Drivers of Health     Financial Resource Strain: Not on file   Food Insecurity: Not on file   Transportation Needs: Not on file   Physical Activity: Not on file   Stress: Not on file   Social Connections: Not on file   Intimate Partner Violence: Not on file   Housing Stability: Not on file       Problems, Past medical history, past surgical history, Medications, allergies, social and family history reviewed and as per the electronic medical record from today's encounter    Review of Systems:  CONST: No fever, chills, fatigue, weight changes  EYES: No loss of vision  ENT: No hearing loss, tinnitus  CV: No chest pain, palpitations  RESP: No dyspnea, shortness of breath, cough  GI: No stool incontinence, nausea, vomiting  : No urinary incontinence  MSK: No joint swelling  SKIN: No rash, no hives  NEURO: No headache, dizziness, weakness  PSYCH: Hx of anxiety and depression  HEM/LYMPH: No easy bruising or bleeding  All other systems reviewed are negative     Physical Exam:  Vitals: /82   Pulse 77   Resp 20   Ht 1.626 m (5' 4\")   Wt 102 kg (224 lb)   SpO2 97%   BMI 38.45 kg/m²   General: No apparent distress. Alert, appropriate, oriented x 3. Mood and affect normal. Speaking in full sentences.  HENT: Normocephalic, atraumatic. Hearing intact.  Eyes: Extraocular movements grossly intact. Pupils equal and round.   Neck: Supple, trachea midline.  Lungs: Symmetric respiratory excursion on visual exam, nonlabored breathing.  Extremities: No clubbing, cyanosis, " or edema noted in arms or legs.  Skin: No rashes, lesions, alopecia noted on back or extremities.   Back: Reports tenderness to palpation of the left GTB  Neuro: Alert and appropriate. Gait within normal limits. Bulk and tone within normal limits.    Laboratory Data:  The following laboratory data were reviewed during this visit:   Lab Results   Component Value Date    WBC 5.2 10/23/2024    RBC 4.16 10/23/2024    HGB 12.7 10/23/2024    HCT 39.8 10/23/2024     10/23/2024      Lab Results   Component Value Date    INR 0.9 10/23/2024     Lab Results   Component Value Date    CREATININE 0.71 10/23/2024    HGBA1C 5.7 (H) 10/23/2024       Imaging:  The following imaging impressions were reviewed by me during this visit:    -1/17/22 cervical spine MRI shows C5-6 circumferential disc bulge at this level, mild facet arthrosis and uncovertebral joint spurring bilaterally resulting in bilateral foraminal stenosis, more severe on the right than on the left.  -11/6/23 left shoulder xray shows mild degenerative narrowing and spurring of the acromioclavicular joint space. The glenohumeral joint space overall appears maintained.      I also personally reviewed the images from the above studies myself. These images and my interpretation of them contributed to the management and decision making of the patient's medical plan.    ASSESSMENT:  Ms. Arin Phan is a 59 y.o. female with left neck and shoulder pain, left lateral hip pain that is consistent with:    1. Greater trochanteric bursitis of left hip    2. Myofascial pain    3. Cervical radiculopathy          PLAN:    Diagnostics:   -I reviewed her most recent cervical spine MRI (see above for details).  No new diagnostics at this time    Physical Therapy and Rehabilitation:     -Completed physical therapy for her pain in the past, without durable improvement.  She should maintain her current home exercise program    Psychologically:  -No needs at this  time    Medications  -Recommend trial of Duloxetine 60 mg daily.  Education on this medication provided today.  Side effects reviewed. Titration instructions provided    Duration  - Multiple years    Interventions:  -I suspect her neck and shoulder pain is multifactorial in the setting of cervical radiculopathy, cervical facet arthropathy, and left shoulder impingement.  She has previously undergone a C6/7 MARK with 60% pain relief for 3 to 4 months.  She underwent a repeat C7/T1 (had to change levels intra-procedure) with 50% pain relief. I would also consider cervical mbb/RF ablation in the future as a means of providing pain relief while avoiding additional steroid use since she is getting steroid injections from multiple different providers.  - Her left lateral hip pain is consistent with greater trochanteric bursitis. She previously underwent a left GTB injection with 70% pain relief for 4 months.  This pain has now returned to baseline.  I recommend proceeding with a repeat left GTB injection utilizing live fluoroscopy and local anesthesia.  Risk, benefits, alternatives discussed.  She like to proceed.      Sincerely,  Efrem Lyn MD  Replaced by [facility] [place]

## 2024-11-20 NOTE — PROGRESS NOTES
"Physical Therapy Evaluation and Treatment     Patient Name: Arin Phan  MRN: [id number]  Encounter date: 11/20/2024  Time Calculation  Start Time: 0750  Stop Time: 0830  Time Calculation (min): 40 min  PT Evaluation Time Entry  PT Evaluation (Low) Time Entry: 40  Low complexity due to patient's clinical presentation being stable and uncomplicated by any significant comorbidities that may affect rehab tolerance and progression.     Visit # 1 of 6  Visits/Dates Authorized: AUTH REQ / 100% COVERAGE thru 12/31/24 / $2700 OOP MET / $5 copay - waived thru 12/31/24 / PT 20V/yr - 0 used   Insurance Type: Payor: [employer] / Plan: [employer] / Product Type: *No Product type* /     Current Problem:   Problem List Items Addressed This Visit    None  Visit Diagnoses         Codes    Benign paroxysmal positional vertigo due to bilateral vestibular disorder    -  Primary H81.13    Relevant Orders    Follow Up In Physical Therapy    Vertigo     R42    Relevant Orders    Follow Up In Physical Therapy          Precautions:          Subjective    Subjective Evaluation    History of Present Illness  Mechanism of injury: Pt presents to therapy with reports of dizziness. She reports it has been a chronic problem for a couple years. She has gone to an ENT in the past to \"reposition the crystals\" She reports that it occurs when she lays down in bed and turns her head to the right and when she is in the shower and she puts her head up. She feels like a rush occurs and she will get nauseous. She reports that it does not last long; she will stay in the position during the sensation goes away. She does have a history of headaches (R eye), thumping in the R ear. She gets headaches daily but does not report any sensitivity to light or sound. Headaches occur when she is sleeping. She does have degenerative spinal stenosis of L cervical vertebra per pt report, which does cause discomfort. She fell about 6 months ago but " does not know how it occurred, did occur outdoors. She does work as a hairdresser.     Quality of life: good    Pain  Current pain ratin  At best pain ratin  At worst pain ratin  Location: Cervical  Quality: discomfort    Treatments  Treatments tried: ENT treatment for BPPV.  Patient Goals  Patient goal: Reduce dizziness         Objective        Neuro Oculomotor:  Range of Motion: Normal  Smooth Pursuit: Other: (comment) (Overall normal motion - diagonal pattern appeared to create symptom; pt reports a weird sensation)  Horizontal Saccades: Normal  Vertical Saccades: Normal  Eye Alignment : Normal  Distraction Cover: Normal  Distraction Uncover: Normal  Convergence: Normal  Neuro Vestibular:  Horizontal VOR: Negative  Vertical VOR: Negative  R head thrust: Positive, Other: (comment) (nystagmus instantly during testing with postive finding)  L head thrust: Negative  Spontaneous Nystagmus: Absent  Gaze Evoked Nystagmus: Absent  Positional:  [place] Right: positive  [place] Left: positive  Horizontal Roll Test Right: Other (comment): (No nystagmus however did report odd sensation)  Horizontal Roll Test Left: negative     Outcome Measures:  Other Measures  Dizziness Handicap Inventory: 16     Treatments:  Balance/Neuromuscular Re-Education  Balance/Neuromuscular Re-Education Activity Performed: Yes  Balance/Neuromuscular Re-Education Activity 1: Epley manuever R side x1    HEP / Access Codes: Will provide at later date    Assessment   Assessment & Plan     Assessment  Impairments: impaired balance  Other impairment: Dizziness  Assessment details: Pt is a 59 y.o female presenting to therapy with dizziness. Pt demonstrated some abnormal findings during vestibular ocular testing however these may be multifactorial due to history of headaches and cervical deficits as well as potential R hypofunction. Largest finding this date was positive BPPV posterior canal bilaterally which does align with chief  complaints. Overall BPPV does appear to be creating current limitations however cannot rule out involvement of headaches, cervical and potential hypofunction. At this time pt would benefit from skilled physical therapy in order to prevent further functional decline and reduce dizziness.    Barriers to therapy: n/a  Prognosis: good  Prognosis details: Good response to treatment in the past     Goals  Reduce dizziness    Plan  Therapy options: will be seen for skilled physical therapy services  Planned therapy interventions: manual therapy, balance/weight-bearing training, postural training, body mechanics training, therapeutic activities, functional ROM exercises, gait training, home exercise program, flexibility, fine motor coordination training and soft tissue mobilization  Other planned therapy interventions: Canalith repositioning  Frequency: 1x week  Duration in visits: 5  Duration in weeks: 6  Treatment plan discussed with: patient           Goals:   Active       PT Problem       Pt will be 100% IND with HEP in 6 weeks in order to maintain progress with therapy.         Start:  11/20/24    Expected End:  01/19/25            Pt will report a reduction in dizziness score to 0/10 in 6 weeks in order to improve sleep, bathing and work tasks.        Start:  11/20/24    Expected End:  01/19/25            Pt will improve DHI self-reported score to 0 in 6 weeks in order to demonstrate improvement with functional tasks including cooking, dressing, bathing, transfers and work tasks        Start:  11/20/24    Expected End:  01/19/25

## 2024-11-20 NOTE — PROGRESS NOTES
"[facility]  Follow Up Office Visit Note 2024    Patient Information: Arin Phan, MRN: [id number], : 1965   Primary Care/Referring Physician: Jimenez Haines DO, [address] / [facility] [id number]     Chief Complaint: Left lateral hip pain    Interval History: Mrs. Phan presents today for follow up. At her last visit no changes were made    Today she reports doing slightly worse since last seen. She feels like she is \"walking funny\" recently and her left lateral hip has been bothering her more. She is interested in repeating the left GTB injection which provided 70% relief of this pain last time.     Brief History of Pain: Ms. Arin Phan is a 59 y.o. female with a PMHx of asthma, GAYE, anxiety, depression, hx of gastric sleeve who presents for left sided neck and shoulder pain.    She describes primarily axial neck pain, worse on the left, which worsens when extending her head, rotating her head to the left.  This pain radiates down to her left shoulder but not further down the arm.  She does describe intermittent tingling in her hands.  She has undergone a C6/7 MARK with Dr. Jones in the past with over 60% pain relief for 3-4 months. She has also been utilizing Celecoxib daily and ran out a few months ago with significant worsening of her pain.     She also describes pain in other areas of her body, including her left shoulder, left hip, and bilateral knees. Was last seen by Dr. Vegas on 23 who performed a left subacromial bursa injection. She is also seeing Dr. Prescott for hip and knee pain (has had injections in both knees and her left hip). Of note, she is planning to undergo surgery on her right foot on 24.     Current Pain Medications: OTC Tylenol  Previously Tried Pain Medications: Celecoxib, Duloxetine - not sure why this was stopped, denies side effects    Relevant Surgeries: Denies spine or major orthopedic surgery   Injections: Underwent bilateral " From: Nikki Prieto  To: Dr. Alejandra Arteaga  Sent: 3/2/2024 8:56 AM EST  Subject: scopolamine transdermal patch    I forgot to ask at my last appointment but I am going on a cruise in a few weeks and I was hoping you could call in a prescription for the scopolamine transdermal patches to wear while on the boat.    I use on the [facility] [address] in [place].     Thanks!  Nikki Prieto   subacromial bursa injections, left hip injection, bilateral knee injections, C7/T1 MARK - 50% pain relief, left GTB injection - 70% pain relief  Physical/Occupational Therapy: Has done PT for this pain in the past    Medications:   Current Outpatient Medications   Medication Instructions    acetaminophen (TylenoL) 325 mg tablet Every 4 hours    albuterol (Ventolin HFA) 90 mcg/actuation inhaler Inhale.    calcium citrate-vitamin D3 500 mg-12.5 mcg (500 unit) tablet,chewable 1 tablet, 2 times daily    cyanocobalamin (Vitamin B-12) 100 mcg tablet 1 tablet, Daily    DULoxetine (Cymbalta) 30 mg DR capsule Take 1 capsule by mouth daily for 2 weeks. If tolerating increase to 2 capsules by mouth once a day. Do not crush or chew.    gabapentin (Neurontin) 300 mg capsule TAKE ONE CAPSULE BY MOUTH TWO TIMES A DAY FOR 2 WEEKS    Lidocaine Pain Relief 4 % patch APPLY ONE PATCH EXTERNALLY DAILY AS DIRECTED AS NEEDED    multivit-minerals/folic acid (ADULT ONE DAILY MULTIVITAMIN ORAL) Take by mouth. As directed    ondansetron (Zofran) 4 mg tablet Take by mouth.    SUMAtriptan (IMITREX) 50 mg, oral, Once as needed, May repeat after 2 hours.    topiramate (TOPAMAX) 25 mg, oral, 2 times daily      Allergies:   Allergies   Allergen Reactions    House Dust Mite Other     congested, stuffy    Mold Other     congested, stuffy       Past Medical & Surgical History:  History reviewed. No pertinent past medical history. History reviewed. No pertinent surgical history.    Family History   Problem Relation Name Age of Onset    Heart disease Mother 81     Coronary artery disease Mother 81     Other (s/p cabg) Mother 81     Hypertension Father 81     Obesity Father 81     Atrial fibrillation Father 81     Heart failure Father 81     Other (CRF) Father 81     Other (CVA) Brother 59     Breast cancer Maternal Grandmother      Breast cancer Paternal Grandmother       Social History     Socioeconomic History    Marital status: Single     Spouse name:  "Not on file    Number of children: Not on file    Years of education: Not on file    Highest education level: Not on file   Occupational History    Not on file   Tobacco Use    Smoking status: Never    Smokeless tobacco: Never   Vaping Use    Vaping status: Not on file   Substance and Sexual Activity    Alcohol use: Never    Drug use: Never    Sexual activity: Defer   Other Topics Concern    Not on file   Social History Narrative    Not on file     Social Drivers of Health     Financial Resource Strain: Not on file   Food Insecurity: Not on file   Transportation Needs: Not on file   Physical Activity: Not on file   Stress: Not on file   Social Connections: Not on file   Intimate Partner Violence: Not on file   Housing Stability: Not on file       Problems, Past medical history, past surgical history, Medications, allergies, social and family history reviewed and as per the electronic medical record from today's encounter    Review of Systems:  CONST: No fever, chills, fatigue, weight changes  EYES: No loss of vision  ENT: No hearing loss, tinnitus  CV: No chest pain, palpitations  RESP: No dyspnea, shortness of breath, cough  GI: No stool incontinence, nausea, vomiting  : No urinary incontinence  MSK: No joint swelling  SKIN: No rash, no hives  NEURO: No headache, dizziness, weakness  PSYCH: Hx of anxiety and depression  HEM/LYMPH: No easy bruising or bleeding  All other systems reviewed are negative     Physical Exam:  Vitals: /82   Pulse 77   Resp 20   Ht 1.626 m (5' 4\")   Wt 102 kg (224 lb)   SpO2 97%   BMI 38.45 kg/m²   General: No apparent distress. Alert, appropriate, oriented x 3. Mood and affect normal. Speaking in full sentences.  HENT: Normocephalic, atraumatic. Hearing intact.  Eyes: Extraocular movements grossly intact. Pupils equal and round.   Neck: Supple, trachea midline.  Lungs: Symmetric respiratory excursion on visual exam, nonlabored breathing.  Extremities: No clubbing, cyanosis, " or edema noted in arms or legs.  Skin: No rashes, lesions, alopecia noted on back or extremities.   Back: Reports tenderness to palpation of the left GTB  Neuro: Alert and appropriate. Gait within normal limits. Bulk and tone within normal limits.    Laboratory Data:  The following laboratory data were reviewed during this visit:   Lab Results   Component Value Date    WBC 5.2 10/23/2024    RBC 4.16 10/23/2024    HGB 12.7 10/23/2024    HCT 39.8 10/23/2024     10/23/2024      Lab Results   Component Value Date    INR 0.9 10/23/2024     Lab Results   Component Value Date    CREATININE 0.71 10/23/2024    HGBA1C 5.7 (H) 10/23/2024       Imaging:  The following imaging impressions were reviewed by me during this visit:    -1/17/22 cervical spine MRI shows C5-6 circumferential disc bulge at this level, mild facet arthrosis and uncovertebral joint spurring bilaterally resulting in bilateral foraminal stenosis, more severe on the right than on the left.  -11/6/23 left shoulder xray shows mild degenerative narrowing and spurring of the acromioclavicular joint space. The glenohumeral joint space overall appears maintained.      I also personally reviewed the images from the above studies myself. These images and my interpretation of them contributed to the management and decision making of the patient's medical plan.    ASSESSMENT:  Ms. Arin Phan is a 59 y.o. female with left neck and shoulder pain, left lateral hip pain that is consistent with:    1. Greater trochanteric bursitis of left hip    2. Myofascial pain    3. Cervical radiculopathy          PLAN:    Diagnostics:   -I reviewed her most recent cervical spine MRI (see above for details).  No new diagnostics at this time    Physical Therapy and Rehabilitation:     -Completed physical therapy for her pain in the past, without durable improvement.  She should maintain her current home exercise program    Psychologically:  -No needs at this  time    Medications  -Recommend trial of Duloxetine 60 mg daily.  Education on this medication provided today.  Side effects reviewed. Titration instructions provided    Duration  - Multiple years    Interventions:  -I suspect her neck and shoulder pain is multifactorial in the setting of cervical radiculopathy, cervical facet arthropathy, and left shoulder impingement.  She has previously undergone a C6/7 MARK with 60% pain relief for 3 to 4 months.  She underwent a repeat C7/T1 (had to change levels intra-procedure) with 50% pain relief. I would also consider cervical mbb/RF ablation in the future as a means of providing pain relief while avoiding additional steroid use since she is getting steroid injections from multiple different providers.  - Her left lateral hip pain is consistent with greater trochanteric bursitis. She previously underwent a left GTB injection with 70% pain relief for 4 months.  This pain has now returned to baseline.  I recommend proceeding with a repeat left GTB injection utilizing live fluoroscopy and local anesthesia.  Risk, benefits, alternatives discussed.  She like to proceed.      Sincerely,  Efrem Lyn MD  [facility] - [place]

## 2024-11-27 ENCOUNTER — TREATMENT (OUTPATIENT)
Dept: PHYSICAL THERAPY | Facility: CLINIC | Age: 59
End: 2024-11-27
Payer: MEDICARE

## 2024-11-27 DIAGNOSIS — H81.13 BENIGN PAROXYSMAL POSITIONAL VERTIGO DUE TO BILATERAL VESTIBULAR DISORDER: ICD-10-CM

## 2024-11-27 DIAGNOSIS — R42 VERTIGO: ICD-10-CM

## 2024-11-27 PROCEDURE — 97140 MANUAL THERAPY 1/> REGIONS: CPT | Mod: GP | Performed by: PHYSICAL THERAPIST

## 2024-11-27 ASSESSMENT — PAIN - FUNCTIONAL ASSESSMENT: PAIN_FUNCTIONAL_ASSESSMENT: 0-10

## 2024-11-27 ASSESSMENT — PAIN SCALES - GENERAL: PAINLEVEL_OUTOF10: 8

## 2024-11-27 NOTE — PROGRESS NOTES
Physical Therapy Treatment    Patient Name: Arin Phan  MRN: [id number]  Today's Date: 11/27/2024  Time Calculation  Start Time: 0801  Stop Time: 0831  Time Calculation (min): 30 min  PT Therapeutic Procedures Time Entry  Manual Therapy Time Entry: 30    Insurance:  Visit number: 2 of 6  Authorization info: 6 visits until 12/31/2024  Insurance Type: Payor: [employer] / Plan: [employer] / Product Type: *No Product type* /     Current Problem   1. Vertigo  Follow Up In Physical Therapy      2. Benign paroxysmal positional vertigo due to bilateral vestibular disorder  Follow Up In Physical Therapy          Subjective   General   General Comment: Pt does not report any dizziness since the initial eval however does have a headache this date.  Precautions:  Precautions  Precautions Comment: None  Pain   Pain Assessment: 0-10  0-10 (Numeric) Pain Score: 8  Post Treatment Pain Level 5-6    Objective   Negative BPPV testing this date     Treatments:    Manual:  Manual Therapy  Manual Therapy Performed: Yes  Manual Therapy Activity 1: Quick recheck of BPPV  Manual Therapy Activity 2: STM along suboccipitals, paraspinatls and scalenes      Assessment   Assessment:    Pt tolerated session well, recheck of viky hallpike without any positive findings this date, focus on manual therapy due to headache increase with reduction post treatment this date.     Plan:    Continues to assess dizziness symptoms, focus on cervical therapy     OP EDUCATION:   Use of heat     Goals:   Active       PT Problem       Pt will be 100% IND with HEP in 6 weeks in order to maintain progress with therapy.         Start:  11/20/24    Expected End:  01/19/25            Pt will report a reduction in dizziness score to 0/10 in 6 weeks in order to improve sleep, bathing and work tasks.        Start:  11/20/24    Expected End:  01/19/25            Pt will improve DHI self-reported score to 0 in 6 weeks in order to demonstrate  improvement with functional tasks including cooking, dressing, bathing, transfers and work tasks        Start:  11/20/24    Expected End:  01/19/25

## 2024-12-02 ENCOUNTER — APPOINTMENT (OUTPATIENT)
Dept: PRIMARY CARE | Facility: CLINIC | Age: 59
End: 2024-12-02
Payer: MEDICARE

## 2024-12-04 ENCOUNTER — DOCUMENTATION (OUTPATIENT)
Dept: PHYSICAL THERAPY | Facility: CLINIC | Age: 59
End: 2024-12-04
Payer: MEDICARE

## 2024-12-04 NOTE — PROGRESS NOTES
Physical Therapy                 Therapy Communication Note    Patient Name: Arin Phan  MRN: [id number]  Department:   Room: Room/bed info not found  Today's Date: 12/4/2024     Discipline: Physical Therapy    Missed Visit Reason:      Missed Time: No Show    Comment: PT called and LVM for pt, reminding her of next appointment 12/11

## 2024-12-09 ENCOUNTER — HOSPITAL ENCOUNTER (OUTPATIENT)
Dept: GASTROENTEROLOGY | Facility: HOSPITAL | Age: 59
Discharge: HOME | End: 2024-12-09
Payer: MEDICARE

## 2024-12-09 VITALS
HEART RATE: 74 BPM | SYSTOLIC BLOOD PRESSURE: 123 MMHG | TEMPERATURE: 96.6 F | HEIGHT: 64 IN | WEIGHT: 222 LBS | OXYGEN SATURATION: 98 % | BODY MASS INDEX: 37.9 KG/M2 | DIASTOLIC BLOOD PRESSURE: 77 MMHG | RESPIRATION RATE: 17 BRPM

## 2024-12-09 DIAGNOSIS — M70.62 GREATER TROCHANTERIC BURSITIS OF LEFT HIP: ICD-10-CM

## 2024-12-09 PROCEDURE — 2500000004 HC RX 250 GENERAL PHARMACY W/ HCPCS (ALT 636 FOR OP/ED): Performed by: STUDENT IN AN ORGANIZED HEALTH CARE EDUCATION/TRAINING PROGRAM

## 2024-12-09 PROCEDURE — 20610 DRAIN/INJ JOINT/BURSA W/O US: CPT | Performed by: STUDENT IN AN ORGANIZED HEALTH CARE EDUCATION/TRAINING PROGRAM

## 2024-12-09 PROCEDURE — 2550000001 HC RX 255 CONTRASTS: Performed by: STUDENT IN AN ORGANIZED HEALTH CARE EDUCATION/TRAINING PROGRAM

## 2024-12-09 RX ORDER — LIDOCAINE HYDROCHLORIDE 10 MG/ML
INJECTION, SOLUTION EPIDURAL; INFILTRATION; INTRACAUDAL; PERINEURAL AS NEEDED
Status: COMPLETED | OUTPATIENT
Start: 2024-12-09 | End: 2024-12-09

## 2024-12-09 RX ORDER — METHYLPREDNISOLONE ACETATE 40 MG/ML
INJECTION, SUSPENSION INTRA-ARTICULAR; INTRALESIONAL; INTRAMUSCULAR; SOFT TISSUE AS NEEDED
Status: COMPLETED | OUTPATIENT
Start: 2024-12-09 | End: 2024-12-09

## 2024-12-09 RX ORDER — BUPIVACAINE HYDROCHLORIDE 2.5 MG/ML
INJECTION, SOLUTION EPIDURAL; INFILTRATION; INTRACAUDAL AS NEEDED
Status: COMPLETED | OUTPATIENT
Start: 2024-12-09 | End: 2024-12-09

## 2024-12-09 ASSESSMENT — ENCOUNTER SYMPTOMS
DEPRESSION: 0
OCCASIONAL FEELINGS OF UNSTEADINESS: 0
LOSS OF SENSATION IN FEET: 0

## 2024-12-09 ASSESSMENT — PAIN SCALES - GENERAL
PAINLEVEL_OUTOF10: 2
PAINLEVEL_OUTOF10: 0 - NO PAIN

## 2024-12-09 ASSESSMENT — PAIN - FUNCTIONAL ASSESSMENT
PAIN_FUNCTIONAL_ASSESSMENT: 0-10
PAIN_FUNCTIONAL_ASSESSMENT: 0-10

## 2024-12-09 NOTE — POST-PROCEDURE NOTE
Discharge instructions reviewed. All questions answered. No needs at this time. Ambulatory discharge per patient request.    [facility] refill protocol or controlled substance

## 2024-12-09 NOTE — DISCHARGE INSTRUCTIONS
DISCHARGE INSTRUCTIONS FOR INJECTIONS     You underwent a left sided greater trochanteric bursa injection today    Aftermost injections, it is recommended that you relax and limit your activity for the remainder of the day unless you have been told otherwise by your pain physician.  You should not drive a car, operate machinery, or make important legal decisions unless otherwise directed by your pain physician.  You may resume your normal activity, including exercise, tomorrow.      Keep a written pain diary of how much pain relief you experienced following the injection procedure and the length of time of pain relief you experienced pain relief. Following diagnostic injections like medial branch nerve blocks, sacroiliac joint blocks, stellate ganglion injections and other blocks, it is very important you record the specific amount of pain relief you experienced immediately after the injectionand how long it lasted. Your doctor will ask you for this information at your follow up visit.     For all injections, please keep the injection site dry and inspect the site for a couple of days. You may remove the Band-Aid the day of the injection at any time.     Some discomfort, bruising or slight swelling may occur at the injection site. This is not abnormal if it occurs.  If needed you may:    -Take over the counter medication such as Tylenol or Motrin.   -Apply an ice pack for 30 minutes, 2 to 3 times a day for the first 24 hours.     You may shower today; no soaking baths, hot tubs, whirlpools or swimming pools for two days.      If you are given steroids in your injection, it may take 3-5 days for the steroid medication to take effect. You may notice a worsening of your symptoms for 1-2 days after the injection. This is not abnormal.  You may use acetaminophen, ibuprofen, or prescription medication that your doctor may have prescribed for you if you need to do so.     A few common side effects of steroids include  facial flushing, sweating, restlessness, irritability,difficulty sleeping, increase in blood sugar, and increased blood pressure. If you have diabetes, please monitor your blood sugar at least once a day for at least 5 days. If you have poorly controlled high blood pressure, monitoryour blood pressure for at least 2 days and contact your primary care physician if these numbers are unusually high for you.      If you take aspirin or non-steroidal anti-inflammatory drugs (examples are Motrin, Advil, ibuprofen, Naprosyn, Voltaren, Relafen, etc.) you may restart these this evening, but stop taking it 3 days before your next appointment, unless instructed otherwiseby your physician.      You do not need to discontinue non-aspirin-containing pain medications prior to an injection (examples: Celebrex, tramadol, hydrocodone and acetaminophen).      If you take a blood thinning medication (Coumadin, Lovenox, Fragmin,Ticlid, Plavix, Pradaxa, etc.), please discuss this with your primary care physician/cardiologist and your pain physician. These medications MUST be discontinued before you can have an injection safely, without the risk of uncontrolled bleeding. If these medications are not discontinued for an appropriate period of time, you will not be able to receivean injection.      If you are taking Coumadin, please have your INR checked the morning of your procedure and bringthe result to your appointment unless otherwise instructed. If your INR is over 1.2, your injection may need to be rescheduled to avoid uncontrolled bleeding from the needle placement.     Call [facility] at [phone] between 8am-4pm Monday - Friday if you are experiencing the following:    If you received an epidural or spinal injection:    -Headache that doesnot go away with medicine, is worse when sitting or standing up, and is greatly relieved upon lying down.   -Severe pain worse than or different than your baseline pain.    -Chills or fever (101º F or greater).   -Drainage or signs of infection at the injection site     Go directly to the Emergency Department if you are experiencing the following and received an epidural or spinal injection:   -Abrupt weakness or progressive weakness in your legs that starts after you leave the clinic.   -Abrupt severe or worsening numbness in your legs.   -Inability to urinate after the injection or loss of bowel or bladder control without the urge to defecate or urinate.     If you have a clinical question that cannot wait until your next appointment, please call [phone] between 8am-4pm Monday - Friday or send a [employer] message. We do our best to return all non-emergency messages within 24 hours, Monday - Friday. A nurse or physician will return your message.      If you need to cancel an appointment, please call the scheduling staff at [phone] during normal business hours or leave a message at least 24 hours in advance.     If you are going to be sedated for your next procedure, you MUST have responsible adult who can legally drive accompany you home. You cannot eat or drink for eight hours prior to the planned procedure if you are going to receive sedation. You may take your non-blood thinning medications with a small sip of water.

## 2024-12-10 ENCOUNTER — OFFICE VISIT (OUTPATIENT)
Dept: PRIMARY CARE | Facility: CLINIC | Age: 59
End: 2024-12-10
Payer: MEDICARE

## 2024-12-10 VITALS
RESPIRATION RATE: 16 BRPM | SYSTOLIC BLOOD PRESSURE: 138 MMHG | OXYGEN SATURATION: 98 % | TEMPERATURE: 98.8 F | HEART RATE: 78 BPM | WEIGHT: 233.4 LBS | BODY MASS INDEX: 40.06 KG/M2 | DIASTOLIC BLOOD PRESSURE: 86 MMHG

## 2024-12-10 DIAGNOSIS — Z78.9 WEIGHT LOSS ADVISED: ICD-10-CM

## 2024-12-10 DIAGNOSIS — E66.01 CLASS 3 SEVERE OBESITY DUE TO EXCESS CALORIES WITH SERIOUS COMORBIDITY AND BODY MASS INDEX (BMI) OF 40.0 TO 44.9 IN ADULT: Primary | ICD-10-CM

## 2024-12-10 DIAGNOSIS — E66.813 CLASS 3 SEVERE OBESITY DUE TO EXCESS CALORIES WITH SERIOUS COMORBIDITY AND BODY MASS INDEX (BMI) OF 40.0 TO 44.9 IN ADULT: Primary | ICD-10-CM

## 2024-12-10 DIAGNOSIS — R73.03 PREDIABETES: ICD-10-CM

## 2024-12-10 PROCEDURE — 99214 OFFICE O/P EST MOD 30 MIN: CPT | Performed by: FAMILY MEDICINE

## 2024-12-10 PROCEDURE — 1036F TOBACCO NON-USER: CPT | Performed by: FAMILY MEDICINE

## 2024-12-10 ASSESSMENT — ENCOUNTER SYMPTOMS
OCCASIONAL FEELINGS OF UNSTEADINESS: 0
DEPRESSION: 0
LOSS OF SENSATION IN FEET: 0

## 2024-12-10 ASSESSMENT — PATIENT HEALTH QUESTIONNAIRE - PHQ9
SUM OF ALL RESPONSES TO PHQ9 QUESTIONS 1 & 2: 0
1. LITTLE INTEREST OR PLEASURE IN DOING THINGS: NOT AT ALL
2. FEELING DOWN, DEPRESSED OR HOPELESS: NOT AT ALL

## 2024-12-10 ASSESSMENT — PAIN SCALES - GENERAL: PAINLEVEL_OUTOF10: 0-NO PAIN

## 2024-12-10 NOTE — PROGRESS NOTES
Outpatient Visit Note    Chief Complaint   Patient presents with    Weight Loss       HPI:  Arin Phan is a 59 y.o. female here to discuss her prediabetes and interested in losing weight.    Recent blood work shows prediabetes and elevated cholesterol.  She has had difficulty losing satisfactory weight despite diet and exercise for more than 6 months.    No family history of thyroid cancer.  Birth control not needed as she is menopausal.     PHQ9/GAD7:         Patient Active Problem List    Diagnosis Date Noted    Obesity, unspecified 01/02/2024    Arthritis of left acromioclavicular joint 11/02/2023    Asthma 11/02/2023    Cervical nerve root impingement 11/02/2023    Chronic rhinitis 11/02/2023    DDD (degenerative disc disease), cervical 11/02/2023    Spondylosis without myelopathy or radiculopathy, cervical region 11/02/2023    Depression with anxiety 11/02/2023    Grief 11/02/2023    Intestinal malabsorption 11/02/2023    Hyperparathyroidism (Multi) 11/02/2023    Mild intermittent asthma without complication (HHS-HCC) 11/02/2023    Mild persistent asthma without complication (HHS-HCC) 11/02/2023    Morbid (severe) obesity due to excess calories (Multi) 11/02/2023    Obstructive sleep apnea syndrome 11/02/2023    Ocular migraine 11/02/2023    Other chronic pain 11/02/2023    Other headache syndrome 11/02/2023    Primary osteoarthritis, right shoulder 11/02/2023    Overflow incontinence 11/02/2023    Stress incontinence (female) (male) 11/02/2023    Tinnitus of both ears 11/02/2023    Vitamin D deficiency 11/02/2023    Sleep apnea 12/08/2016    Eating disorder 10/10/2016    Psychological stress 10/10/2016    Restless leg 08/10/2016    Radial tunnel syndrome 03/23/2015    Osteoarthritis of knee 02/17/2015    Primary osteoarthritis of right knee 02/17/2015    Low back pain 07/09/2003    Osteoarthritis 07/09/2003        History reviewed. No pertinent past medical history.     Current  Medications  Current Outpatient Medications   Medication Instructions    acetaminophen (TylenoL) 325 mg tablet Every 4 hours    albuterol (Ventolin HFA) 90 mcg/actuation inhaler Inhale.    calcium citrate-vitamin D3 500 mg-12.5 mcg (500 unit) tablet,chewable 1 tablet, 2 times daily    cyanocobalamin (Vitamin B-12) 100 mcg tablet 1 tablet, Daily    DULoxetine (Cymbalta) 30 mg DR capsule Take 1 capsule by mouth daily for 2 weeks. If tolerating increase to 2 capsules by mouth once a day. Do not crush or chew.    Lidocaine Pain Relief 4 % patch APPLY ONE PATCH EXTERNALLY DAILY AS DIRECTED AS NEEDED    multivit-minerals/folic acid (ADULT ONE DAILY MULTIVITAMIN ORAL) Take by mouth. As directed    SUMAtriptan (IMITREX) 50 mg, oral, Once as needed, May repeat after 2 hours.        Allergies  Allergies   Allergen Reactions    House Dust Mite Other     congested, stuffy    Mold Other     congested, stuffy        History reviewed. No pertinent surgical history.  Family History   Problem Relation Name Age of Onset    Heart disease Mother 81     Coronary artery disease Mother 81     Other (s/p cabg) Mother 81     Hypertension Father 81     Obesity Father 81     Atrial fibrillation Father 81     Heart failure Father 81     Other (CRF) Father 81     Other (CVA) Brother 59     Breast cancer Maternal Grandmother      Breast cancer Paternal Grandmother       Social History     Tobacco Use    Smoking status: Never    Smokeless tobacco: Never   Substance Use Topics    Alcohol use: Not Currently     Comment: Has a drink every niw and the    Drug use: Never     Tobacco Use: Low Risk  (12/10/2024)    Patient History     Smoking Tobacco Use: Never     Smokeless Tobacco Use: Never     Passive Exposure: Not on file        ROS  All pertinent positive symptoms are included in the history of present illness.  All other systems have been reviewed and are negative and noncontributory to this patient's current ailments.    VITAL SIGNS  Vitals:     12/10/24 0924   BP: 138/86   Pulse: 78   Resp: 16   Temp: 37.1 °C (98.8 °F)   SpO2: 98%     Vitals:    12/10/24 0924   Weight: 106 kg (233 lb 6.4 oz)      Body mass index is 40.06 kg/m².     PHYSICAL EXAM  GENERAL APPEARANCE: well nourished, well developed, looks like stated age, in no acute distress, not ill or tired appearing, conversing well.   HEENT: no trauma, normocephalic.   NECK: supple without rigidity, no neck mass was observed.   LUNGS: good chest wall expansion. In no acute respiratory distress.   EXTREMITIES: moving all extremities equally with no edema.   SKIN: normal skin color and pigmentation, without rash.   NEUROLOGIC EXAM: CN II-XII grossly intact, normal gait.   PSYCH: mood and affect appropriate; alert and oriented to time, place, person; no difficulty with speech or language.     Counseling:       Medication education:           Education:  The patient is counseled regarding potential side-effects of all new medications          Understanding:  Patient expressed understanding of information conveyed today          Adherence:  No barriers to adherence identified     Assessment/Plan   Problem List Items Addressed This Visit             ICD-10-CM    Obesity, unspecified - Primary E66.9     Other Visit Diagnoses         Codes    Weight loss advised     Z78.9    Prediabetes     R73.03            Additional Visit Plans:   - We discussed that a medication we can consider today is Ozempic / Wegovy / Mounjaro / Trulicity / Zepbound. This medication class works on receptors in the brain to reduce appetite and regulate caloric intake. It also slows gastric emptying of your food - making you feel vega earlier and for longer. It is a medication that you administer once a week. Possible side effects include heartburn, strain on the pancreas or a racing heart. We will monitor your sugar levels and pancreas numbers while on this. We discussed that rodent studies have shown an increased risk of forming  "thyroid cancer but the significance of this in humans is undetermined. You have no family history of thyroid or endocrine cancers. Things to look out for in this regard would be a neck mass, chronic coarseness or trouble swallowing. Please follow-up if any of these occur.    - It all depends on how well these are preferred by your insurance and their coverage.    - For women of child bearing age, it is recommended that you consider birth control to prevent pregnancy while on this medication as birth defects are possible. Not Applicable    - Plan to use the clinical pharmacy services to see if we can get you a covered medicine for starting. You can call Bella Prieto at [phone]. You can follow-up with me or Bella after 30 days up active treatment to assess your weight loss, tolerability, and for us to increase the dose of your medication if indicated. Once you have the medication in hand, please call us to schedule your 1 month follow-up accordingly.    - We discussed eating \"around\" your plate and following a dietary plan with regular exercise while on this medication.    - If we are not successful with this endeavor, I do recommend that you consider seeing a weight loss specialist. Judith Serna or Ju Edgar are weight loss management specialists, please see if they are covered by your insurance and let me know if you need a referral for one of them.       Patient Care Team:  Jimenez Haines DO as PCP - General (Family Medicine)  Alejandrina Hodge MD as PCP - [facility] PCP  MD Alejandrina Hansen MD as Primary Care Provider    Jimenez Haines DO   12/10/24   9:31 AM   " Medical Necessity Information: It is in your best interest to select a reason for this procedure from the list below. All of these items fulfill various CMS LCD requirements except the new and changing color options. Render Post-Care Instructions In Note?: yes Post-Care Instructions: I reviewed with the patient in detail post-care instructions. Patient is to wear sunprotection, and avoid picking at any of the treated lesions. Pt may apply Vaseline to crusted or scabbing areas. Total Number Of Lesions Treated: 5 Anesthesia Volume In Cc: 0.5 Add 52 Modifier (Optional): no Consent: The patient's consent was obtained including but not limited to risks of crusting, scabbing, blistering, scarring, darker or lighter pigmentary change, recurrence, incomplete removal and infection. Detail Level: Zone Medical Necessity Clause: This procedure was medically necessary because the lesions that were treated were irritated and lichenified.

## 2024-12-10 NOTE — PATIENT INSTRUCTIONS
"Problem List Items Addressed This Visit             ICD-10-CM    Obesity, unspecified - Primary E66.9     Other Visit Diagnoses         Codes    Weight loss advised     Z78.9    Prediabetes     R73.03            Additional Visit Plans:   - We discussed that a medication we can consider today is Ozempic / Wegovy / Mounjaro / Trulicity / Zepbound. This medication class works on receptors in the brain to reduce appetite and regulate caloric intake. It also slows gastric emptying of your food - making you feel vega earlier and for longer. It is a medication that you administer once a week. Possible side effects include heartburn, strain on the pancreas or a racing heart. We will monitor your sugar levels and pancreas numbers while on this. We discussed that rodent studies have shown an increased risk of forming thyroid cancer but the significance of this in humans is undetermined. You have no family history of thyroid or endocrine cancers. Things to look out for in this regard would be a neck mass, chronic coarseness or trouble swallowing. Please follow-up if any of these occur.    - It all depends on how well these are preferred by your insurance and their coverage.    - For women of child bearing age, it is recommended that you consider birth control to prevent pregnancy while on this medication as birth defects are possible. Not Applicable    - Plan to use the clinical pharmacy services to see if we can get you a covered medicine for starting. You can call Bella Prieto at [phone]. You can follow-up with me or Bella after 30 days up active treatment to assess your weight loss, tolerability, and for us to increase the dose of your medication if indicated. Once you have the medication in hand, please call us to schedule your 1 month follow-up accordingly.    - We discussed eating \"around\" your plate and following a dietary plan with regular exercise while on this medication.    - If we are not successful " with this endeavor, I do recommend that you consider seeing a weight loss specialist. Judith Serna or Ju Edgar are weight loss management specialists, please see if they are covered by your insurance and let me know if you need a referral for one of them.       Patient Care Team:  Jimenez Haines DO as PCP - General (Family Medicine)  Alejandrina Hodge MD as PCP - [facility] PCP  MD Alejandrina Hansen MD as Primary Care Provider    Jimenez Haines DO   12/10/24   9:31 AM

## 2024-12-11 ENCOUNTER — DOCUMENTATION (OUTPATIENT)
Dept: PHYSICAL THERAPY | Facility: CLINIC | Age: 59
End: 2024-12-11
Payer: MEDICARE

## 2024-12-11 ENCOUNTER — APPOINTMENT (OUTPATIENT)
Dept: PHYSICAL THERAPY | Facility: CLINIC | Age: 59
End: 2024-12-11
Payer: MEDICARE

## 2024-12-11 NOTE — PROGRESS NOTES
Physical Therapy                 Therapy Communication Note    Patient Name: Arin Phan  MRN: [id number]  Department:   Room: Room/bed info not found  Today's Date: 12/11/2024     Discipline: Physical Therapy    Missed Visit Reason:  patient called office and cancelled appt, no reason given.     Missed Time: Cancel    Comment:

## 2024-12-17 ENCOUNTER — APPOINTMENT (OUTPATIENT)
Dept: PHYSICAL THERAPY | Facility: CLINIC | Age: 59
End: 2024-12-17
Payer: MEDICARE

## 2024-12-27 NOTE — PROGRESS NOTES
Clinical Pharmacy Appointment    Patient ID: Arin Phan is a 59 y.o. female who presents for Obesity.    Pt is here for First appointment.     Referring Provider/PCP: Jimenez Haines DO  Last visit with PCP: 12/10/24   Next visit with PCP: 1/14/25      Subjective     HPI  WEIGHT LOSS  BMI Readings from Last 3 Encounters:   12/10/24 40.06 kg/m²   12/09/24 38.11 kg/m²   11/20/24 38.45 kg/m²      Starting weight: 233 lbs. (12/10)  Current weight: 233 lbs.    Lifestyle  Diet: 2 meals/day.  BK: Eggs, turkey sausage  LN: Usually skips  DN: Chicken, eggs, burgers, corn/peas  Snacks: Ritz peanut butter crackers, chips occasionally   Drinks: Crystal Light water  Has worked with nutritionist/dietician? Yes,    Physical Activity: On feet all day ()    Other Potential Contributing Factors  Alcohol use: Average 0 drinks/week  Tobacco use: No  Other drug use: No  Medications that may cause weight gain: none  Mental health: No current concerns  Eating Disorders: Denies Hx of any eating disorder  Comorbidities: Comorbidities: sleep apnea not using an appliance (Had one, but stopped using)      Non-Pharmacological Therapy  Weight loss techniques attempted:  Self-directed dieting: Weight watchers in past    Pharmacological Therapy  Current Medications: None  Adverse Effects: None  Previous Medications: Ozempic- fatigue, mental fog (through a med spa)    Insurance coverage of weight-loss medications? No,    Eligible for copay cards/programs? Yes,    Eligible for  PAP? Yes, reports income <400% FPL    Medication Estimated Cost Expected weight loss Patient Risks/Cautions Notes   Wegovy (semaglutide) ~$650/month w/ savings card 10-20% None      Zepbound (tirzepatide) $650/month   w/ savings card 10-20%     Qsymia (phentermine-topiramate) $98/month via [facility]symia [employer] 5-10% None Controlled substance   Contrave (bupropion-naltrexone) $99/month   via [employer] 5-10% None    Adipex (phentermine) ~$15/month 3-5% None  Controlled substance,  Short-term use only   Renato (OTC Orlistat) ~$60/month 3-5%  Adverse effects likely outweigh benefit.         Medication Reconciliation:  Changed:   Added:   Discontinued:     Drug Interactions  No relevant drug interactions were noted.    Medication System Management  Patients preferred pharmacy: [facility]  Adherence/Organization: No issues reported  Affordability/Accessibility: No insurance coverage of weight loss medications      Objective   Allergies   Allergen Reactions    House Dust Mite Other     congested, stuffy    Mold Other     congested, stuffy     Social History     Social History Narrative    Not on file      Medication Review  Current Outpatient Medications   Medication Instructions    acetaminophen (TylenoL) 325 mg tablet Every 4 hours    albuterol (Ventolin HFA) 90 mcg/actuation inhaler Inhale.    calcium citrate-vitamin D3 500 mg-12.5 mcg (500 unit) tablet,chewable 1 tablet, 2 times daily    cyanocobalamin (Vitamin B-12) 100 mcg tablet 1 tablet, Daily    DULoxetine (Cymbalta) 30 mg DR capsule Take 1 capsule by mouth daily for 2 weeks. If tolerating increase to 2 capsules by mouth once a day. Do not crush or chew.    Lidocaine Pain Relief 4 % patch APPLY ONE PATCH EXTERNALLY DAILY AS DIRECTED AS NEEDED    multivit-minerals/folic acid (ADULT ONE DAILY MULTIVITAMIN ORAL) Take by mouth. As directed    SUMAtriptan (IMITREX) 50 mg, oral, Once as needed, May repeat after 2 hours.      Vitals  BP Readings from Last 2 Encounters:   12/10/24 138/86   12/09/24 123/77     BMI Readings from Last 1 Encounters:   12/10/24 40.06 kg/m²      Labs  A1C  Lab Results   Component Value Date    HGBA1C 5.7 (H) 10/23/2024    HGBA1C 5.7 03/28/2018     BMP  Lab Results   Component Value Date    CALCIUM 8.6 10/23/2024     10/23/2024    K 4.0 10/23/2024    CO2 29 10/23/2024     10/23/2024    BUN 17 10/23/2024    CREATININE 0.71 10/23/2024    EGFR >90 10/23/2024     LFTs  Lab Results  "  Component Value Date    ALT 26 10/23/2024    AST 24 10/23/2024    ALKPHOS 74 10/23/2024    BILITOT 0.4 10/23/2024     FLP  Lab Results   Component Value Date    TRIG 64 10/23/2024    CHOL 201 (H) 10/23/2024    LDLCALC 107 (H) 10/23/2024    HDL 81.0 10/23/2024     Urine Microalbumin  No results found for: \"MICROALBCREA\"  Weight Management  Wt Readings from Last 3 Encounters:   12/10/24 106 kg (233 lb 6.4 oz)   12/09/24 101 kg (222 lb)   11/20/24 102 kg (224 lb)      There is no height or weight on file to calculate BMI.     Assessment/Plan   Problem List Items Addressed This Visit       Obesity, unspecified     Current regimen includes none.. Patient's current weight reported as 233. Has lost 0 lbs (0% of TBW) since starting therapy plan. Due to income reported, plan to apply for VAF for zepbound.    Medication Changes:  START  Zepbound 2.5 mg once weekly         Relevant Orders    Referral to Clinical Pharmacy     Other Visit Diagnoses       Weight loss advised        Relevant Orders    Referral to Clinical Pharmacy    Prediabetes        Relevant Orders    Referral to Clinical Pharmacy           Patient Assistance Screening (VAF)  Patient verbally reports monthly or yearly income which is less than 400% federal poverty level  Application for program has been submitted for the following medications:   Zepbound  Patient aware this process may take up to 2 weeks once income documents have been sent to the team.  If approved, medication must be filled through [facility] pharmacy and may be picked up or mailed to patient.   If approved, medication will be billed through insurance, and patient assistance team will pay the copay. This will result in a $0 copay for the patient.  Counseled patient on mechanism of action, side effects, contraindications, and what to do if the patient misses a dose. All patients questions were answered.      Clinical Pharmacist follow-up: 1/14 at 1040, Telehealth visit    Continue all meds " under the continuation of care with the referring provider and clinical pharmacy team.    Thank you,  Bella Prieto, PharmD, [facility]  Clinical Pharmacy Specialist  [phone]    Verbal consent to manage patient's drug therapy was obtained from the patient. They were informed they may decline to participate or withdraw from participation in pharmacy services at any time.

## 2024-12-30 ENCOUNTER — APPOINTMENT (OUTPATIENT)
Dept: PHARMACY | Facility: HOSPITAL | Age: 59
End: 2024-12-30
Payer: MEDICARE

## 2024-12-30 DIAGNOSIS — Z78.9 WEIGHT LOSS ADVISED: ICD-10-CM

## 2024-12-30 DIAGNOSIS — E66.813 CLASS 3 SEVERE OBESITY DUE TO EXCESS CALORIES WITH SERIOUS COMORBIDITY AND BODY MASS INDEX (BMI) OF 40.0 TO 44.9 IN ADULT: ICD-10-CM

## 2024-12-30 DIAGNOSIS — E66.01 CLASS 3 SEVERE OBESITY DUE TO EXCESS CALORIES WITH SERIOUS COMORBIDITY AND BODY MASS INDEX (BMI) OF 40.0 TO 44.9 IN ADULT: ICD-10-CM

## 2024-12-30 DIAGNOSIS — R73.03 PREDIABETES: ICD-10-CM

## 2024-12-30 NOTE — ASSESSMENT & PLAN NOTE
Current regimen includes none.. Patient's current weight reported as 233. Has lost 0 lbs (0% of TBW) since starting therapy plan. Due to income reported, plan to apply for [facility] for zepbound.    Medication Changes:  START  Zepbound 2.5 mg once weekly

## 2024-12-31 PROCEDURE — RXMED WILLOW AMBULATORY MEDICATION CHARGE

## 2024-12-31 RX ORDER — TIRZEPATIDE 2.5 MG/.5ML
2.5 INJECTION, SOLUTION SUBCUTANEOUS
Qty: 2 ML | Refills: 3 | Status: SHIPPED | OUTPATIENT
Start: 2024-12-31

## 2025-01-02 ENCOUNTER — PHARMACY VISIT (OUTPATIENT)
Dept: PHARMACY | Facility: CLINIC | Age: 60
End: 2025-01-02
Payer: COMMERCIAL

## 2025-01-07 ENCOUNTER — OFFICE VISIT (OUTPATIENT)
Dept: PAIN MEDICINE | Facility: CLINIC | Age: 60
End: 2025-01-07
Payer: MEDICARE

## 2025-01-07 ENCOUNTER — APPOINTMENT (OUTPATIENT)
Dept: PRIMARY CARE | Facility: CLINIC | Age: 60
End: 2025-01-07
Payer: MEDICARE

## 2025-01-07 ENCOUNTER — APPOINTMENT (OUTPATIENT)
Dept: PHYSICAL THERAPY | Facility: CLINIC | Age: 60
End: 2025-01-07
Payer: MEDICARE

## 2025-01-07 VITALS
HEART RATE: 73 BPM | DIASTOLIC BLOOD PRESSURE: 81 MMHG | RESPIRATION RATE: 18 BRPM | BODY MASS INDEX: 39.78 KG/M2 | OXYGEN SATURATION: 99 % | WEIGHT: 233 LBS | HEIGHT: 64 IN | SYSTOLIC BLOOD PRESSURE: 125 MMHG

## 2025-01-07 DIAGNOSIS — M16.12 PRIMARY OSTEOARTHRITIS OF LEFT HIP: Primary | ICD-10-CM

## 2025-01-07 PROCEDURE — 3008F BODY MASS INDEX DOCD: CPT | Performed by: PHYSICIAN ASSISTANT

## 2025-01-07 PROCEDURE — 99213 OFFICE O/P EST LOW 20 MIN: CPT | Performed by: PHYSICIAN ASSISTANT

## 2025-01-07 PROCEDURE — 1036F TOBACCO NON-USER: CPT | Performed by: PHYSICIAN ASSISTANT

## 2025-01-07 ASSESSMENT — PAIN SCALES - GENERAL
PAINLEVEL_OUTOF10: 2
PAINLEVEL_OUTOF10: 2

## 2025-01-07 ASSESSMENT — ENCOUNTER SYMPTOMS
PALPITATIONS: 0
FEVER: 0
NAUSEA: 0
SLEEP DISTURBANCE: 0
FATIGUE: 0
VOMITING: 0
CHEST TIGHTNESS: 0
SHORTNESS OF BREATH: 0
ACTIVITY CHANGE: 0
DIARRHEA: 0
COUGH: 0
CHILLS: 0
UNEXPECTED WEIGHT CHANGE: 0
PAIN: 1
SORE THROAT: 0
VOICE CHANGE: 0

## 2025-01-07 ASSESSMENT — PATIENT HEALTH QUESTIONNAIRE - PHQ9
1. LITTLE INTEREST OR PLEASURE IN DOING THINGS: NOT AT ALL
2. FEELING DOWN, DEPRESSED OR HOPELESS: NOT AT ALL
SUM OF ALL RESPONSES TO PHQ9 QUESTIONS 1 & 2: 0

## 2025-01-07 ASSESSMENT — LIFESTYLE VARIABLES
SKIP TO QUESTIONS 9-10: 1
HOW OFTEN DO YOU HAVE A DRINK CONTAINING ALCOHOL: NEVER
AUDIT-C TOTAL SCORE: 0
HOW MANY STANDARD DRINKS CONTAINING ALCOHOL DO YOU HAVE ON A TYPICAL DAY: PATIENT DOES NOT DRINK
HOW OFTEN DO YOU HAVE SIX OR MORE DRINKS ON ONE OCCASION: NEVER

## 2025-01-07 ASSESSMENT — PAIN - FUNCTIONAL ASSESSMENT: PAIN_FUNCTIONAL_ASSESSMENT: 0-10

## 2025-01-07 NOTE — PROGRESS NOTES
Subjective   Patient ID: Arin Phan is a 59 y.o. female who presents for Pain.  Is a 59-year-old female here today for follow-up from an arthrocentesis.  The left hip is now a 2 out of 10 at worst.  Generally a 0 out of 10.  Patient is able to ambulate further no deep groin pain improved sleep.    Pain  Pertinent negatives include no chest pain, diarrhea, fatigue, fever, nausea, shortness of breath or vomiting.       Review of Systems   Constitutional:  Negative for activity change, chills, fatigue, fever and unexpected weight change.   HENT:  Negative for ear pain, sore throat and voice change.    Eyes:  Negative for visual disturbance.   Respiratory:  Negative for cough, chest tightness and shortness of breath.    Cardiovascular:  Negative for chest pain and palpitations.   Gastrointestinal:  Negative for diarrhea, nausea and vomiting.   Psychiatric/Behavioral:  Negative for behavioral problems, self-injury, sleep disturbance and suicidal ideas.        Objective   Physical Exam  Musculoskeletal:      Comments: Gait felicitas and stride seem to be unaffected         Assessment/Plan   Problem List Items Addressed This Visit    None    Successful hip arthrocentesis.  We can reconsider.  Hopefully this will provide patient greater than 3 months.  We did discuss modification of activities       Ish Shelton PA-C 01/07/25 3:18 PM

## 2025-01-14 ENCOUNTER — APPOINTMENT (OUTPATIENT)
Dept: PHARMACY | Facility: HOSPITAL | Age: 60
End: 2025-01-14
Payer: MEDICARE

## 2025-01-14 ENCOUNTER — APPOINTMENT (OUTPATIENT)
Dept: PRIMARY CARE | Facility: CLINIC | Age: 60
End: 2025-01-14
Payer: MEDICARE

## 2025-01-14 DIAGNOSIS — E66.813 CLASS 3 SEVERE OBESITY DUE TO EXCESS CALORIES WITH SERIOUS COMORBIDITY AND BODY MASS INDEX (BMI) OF 40.0 TO 44.9 IN ADULT: ICD-10-CM

## 2025-01-14 DIAGNOSIS — E66.01 CLASS 3 SEVERE OBESITY DUE TO EXCESS CALORIES WITH SERIOUS COMORBIDITY AND BODY MASS INDEX (BMI) OF 40.0 TO 44.9 IN ADULT: ICD-10-CM

## 2025-01-14 DIAGNOSIS — R73.03 PREDIABETES: ICD-10-CM

## 2025-01-14 DIAGNOSIS — Z78.9 WEIGHT LOSS ADVISED: ICD-10-CM

## 2025-01-14 NOTE — PROGRESS NOTES
Clinical Pharmacy Appointment    Patient ID: Arin Phan is a 59 y.o. female who presents for Obesity.    Pt is here for Follow Up appointment.     Referring Provider/PCP: Jimenez Haines DO  Last visit with PCP: 12/10/24   Next visit with PCP: 1/14/25      Subjective     HPI  WEIGHT LOSS  BMI Readings from Last 3 Encounters:   01/07/25 39.99 kg/m²   12/10/24 40.06 kg/m²   12/09/24 38.11 kg/m²      Starting weight: 231 lbs. (12/10)  Current weight: 226 lbs.    Lifestyle (Lower appetite)  Diet: 2 meals/day.  BK: Eggs, turkey sausage  LN: Usually skips  DN: Chicken, eggs, burgers, corn/peas  Snacks: Ritz peanut butter crackers, chips occasionally   Drinks: Crystal Light water  Has worked with nutritionist/dietician? Yes,    Physical Activity: On feet all day ()    Other Potential Contributing Factors  Alcohol use: Average 0 drinks/week  Tobacco use: No  Other drug use: No  Medications that may cause weight gain: none  Mental health: No current concerns  Eating Disorders: Denies Hx of any eating disorder  Comorbidities: Comorbidities: sleep apnea not using an appliance (Had one, but stopped using)      Non-Pharmacological Therapy  Weight loss techniques attempted:  Self-directed dieting: Weight watchers in past    Pharmacological Therapy  Current Medications: Zepbound 2.5 mg on Sundays x 2  Adverse Effects: None  Previous Medications: Ozempic- fatigue, mental fog (through a med spa)    Insurance coverage of weight-loss medications? No,    Eligible for copay cards/programs? Yes,    Eligible for  PAP? Yes, reports income <400% FPL    Medication Estimated Cost Expected weight loss Patient Risks/Cautions Notes   Wegovy (semaglutide) ~$650/month w/ savings card 10-20% None      Zepbound (tirzepatide) $650/month   w/ savings card 10-20%     Qsymia (phentermine-topiramate) $98/month via [employer] [employer] 5-10% None Controlled substance   Contrave (bupropion-naltrexone) $99/month   via [employer] 5-10% None     Adipex (phentermine) ~$15/month 3-5% None Controlled substance,  Short-term use only   Renato (OTC Orlistat) ~$60/month 3-5%  Adverse effects likely outweigh benefit.         Medication Reconciliation:  Changed:   Added:   Discontinued:     Drug Interactions  No relevant drug interactions were noted.    Medication System Management  Patients preferred pharmacy: [facility]  Adherence/Organization: No issues reported  Affordability/Accessibility: Zepbound through [facility] through 12/31/25      Objective   Allergies   Allergen Reactions    House Dust Mite Other     congested, stuffy    Mold Other     congested, stuffy     Social History     Social History Narrative    Not on file      Medication Review  Current Outpatient Medications   Medication Instructions    acetaminophen (TylenoL) 325 mg tablet Every 4 hours    albuterol (Ventolin HFA) 90 mcg/actuation inhaler Inhale.    calcium citrate-vitamin D3 500 mg-12.5 mcg (500 unit) tablet,chewable 1 tablet, 2 times daily    cyanocobalamin (Vitamin B-12) 100 mcg tablet 1 tablet, Daily    DULoxetine (Cymbalta) 30 mg DR capsule Take 1 capsule by mouth daily for 2 weeks. If tolerating increase to 2 capsules by mouth once a day. Do not crush or chew.    Lidocaine Pain Relief 4 % patch APPLY ONE PATCH EXTERNALLY DAILY AS DIRECTED AS NEEDED    multivit-minerals/folic acid (ADULT ONE DAILY MULTIVITAMIN ORAL) Take by mouth. As directed    SUMAtriptan (IMITREX) 50 mg, oral, Once as needed, May repeat after 2 hours.    Zepbound 2.5 mg, subcutaneous, Every 7 days      Vitals  BP Readings from Last 2 Encounters:   01/07/25 125/81   12/10/24 138/86     BMI Readings from Last 1 Encounters:   01/07/25 39.99 kg/m²      Labs  A1C  Lab Results   Component Value Date    HGBA1C 5.7 (H) 10/23/2024    HGBA1C 5.7 03/28/2018     BMP  Lab Results   Component Value Date    CALCIUM 8.6 10/23/2024     10/23/2024    K 4.0 10/23/2024    CO2 29 10/23/2024     10/23/2024    BUN 17 10/23/2024  "   CREATININE 0.71 10/23/2024    EGFR >90 10/23/2024     LFTs  Lab Results   Component Value Date    ALT 26 10/23/2024    AST 24 10/23/2024    ALKPHOS 74 10/23/2024    BILITOT 0.4 10/23/2024     FLP  Lab Results   Component Value Date    TRIG 64 10/23/2024    CHOL 201 (H) 10/23/2024    LDLCALC 107 (H) 10/23/2024    HDL 81.0 10/23/2024     Urine Microalbumin  No results found for: \"MICROALBCREA\"  Weight Management  Wt Readings from Last 3 Encounters:   01/07/25 106 kg (233 lb)   12/10/24 106 kg (233 lb 6.4 oz)   12/09/24 101 kg (222 lb)      There is no height or weight on file to calculate BMI.     Assessment/Plan   Problem List Items Addressed This Visit       Obesity, unspecified     Current regimen includes Zepbound. Patient's current weight reported as 226. Has lost 5 lbs (2% of TBW) since starting therapy plan. Due to ihaving done 2 injections thus far, continue dose.    Medication Changes:  CONTINUE  Zepbound 2.5 mg once weekly         Relevant Orders    Referral to Clinical Pharmacy     Other Visit Diagnoses       Weight loss advised        Relevant Orders    Referral to Clinical Pharmacy    Prediabetes        Relevant Orders    Referral to Clinical Pharmacy          Clinical Pharmacist follow-up: 1/21 at 1040, Telehealth visit    Continue all meds under the continuation of care with the referring provider and clinical pharmacy team.    Thank you,  Bella Prieto, PharmD, [facility]  Clinical Pharmacy Specialist  [phone]    Verbal consent to manage patient's drug therapy was obtained from the patient. They were informed they may decline to participate or withdraw from participation in pharmacy services at any time.   "

## 2025-01-14 NOTE — ASSESSMENT & PLAN NOTE
Current regimen includes Zepbound. Patient's current weight reported as 226. Has lost 5 lbs (2% of TBW) since starting therapy plan. Due to ihaving done 2 injections thus far, continue dose.    Medication Changes:  CONTINUE  Zepbound 2.5 mg once weekly

## 2025-01-14 NOTE — Clinical Note
Early follow up on new start Zepbound patient as she had reported side effects to compounded ozempic through [facility], doing very well on Zepbound after 2 injections. Will continue on 2.5 mg for now and check back in 2 weeks to decide on titration

## 2025-01-15 ENCOUNTER — OFFICE VISIT (OUTPATIENT)
Dept: URGENT CARE | Age: 60
End: 2025-01-15
Payer: MEDICARE

## 2025-01-15 VITALS
RESPIRATION RATE: 18 BRPM | BODY MASS INDEX: 38.58 KG/M2 | HEART RATE: 78 BPM | DIASTOLIC BLOOD PRESSURE: 69 MMHG | HEIGHT: 64 IN | SYSTOLIC BLOOD PRESSURE: 140 MMHG | WEIGHT: 226 LBS | OXYGEN SATURATION: 99 % | TEMPERATURE: 97.8 F

## 2025-01-15 DIAGNOSIS — S49.91XA INJURY OF RIGHT SHOULDER, INITIAL ENCOUNTER: Primary | ICD-10-CM

## 2025-01-15 PROCEDURE — 3008F BODY MASS INDEX DOCD: CPT | Performed by: NURSE PRACTITIONER

## 2025-01-15 PROCEDURE — 99203 OFFICE O/P NEW LOW 30 MIN: CPT | Performed by: NURSE PRACTITIONER

## 2025-01-15 PROCEDURE — 1036F TOBACCO NON-USER: CPT | Performed by: NURSE PRACTITIONER

## 2025-01-15 RX ORDER — METHYLPREDNISOLONE 4 MG/1
TABLET ORAL
Qty: 21 TABLET | Refills: 0 | Status: SHIPPED | OUTPATIENT
Start: 2025-01-15 | End: 2025-01-21

## 2025-01-15 NOTE — PROGRESS NOTES
Chief Complaint   Patient presents with    Injury     Right shoulder pain x 1 week.  Slipped down stairs.     Patient reports catching herself on the stairs a week ago, no falling. 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   Denies tingling or numbness, no changes in tempeture.   Physical Exam:     Skin: ecchymosis or erythema is not present  Swelling: none  Deformity: None  Tenderness: noted over acromion and clavicle on palpation  ROM: limited adduction, abduction, flexion due to pain  Joint effusion: None    Imaging:   None performed during this visit       Assessment:     Encounter Diagnosis   Name Primary?    Injury of right shoulder, initial encounter Yes          Medical Decision Making & Plan:     Medrol pack  Lidocaine patch to the right shoulder   Advised to follow up with Ortho(appointment already made)    01/15/25 at 11:53 AM - IRISH Lazo-CNP

## 2025-01-21 ENCOUNTER — APPOINTMENT (OUTPATIENT)
Dept: PHARMACY | Facility: HOSPITAL | Age: 60
End: 2025-01-21
Payer: MEDICARE

## 2025-01-21 DIAGNOSIS — E66.813 CLASS 3 SEVERE OBESITY DUE TO EXCESS CALORIES WITH SERIOUS COMORBIDITY AND BODY MASS INDEX (BMI) OF 40.0 TO 44.9 IN ADULT: ICD-10-CM

## 2025-01-21 DIAGNOSIS — E66.01 CLASS 3 SEVERE OBESITY DUE TO EXCESS CALORIES WITH SERIOUS COMORBIDITY AND BODY MASS INDEX (BMI) OF 40.0 TO 44.9 IN ADULT: ICD-10-CM

## 2025-01-21 DIAGNOSIS — Z78.9 WEIGHT LOSS ADVISED: ICD-10-CM

## 2025-01-21 DIAGNOSIS — R73.03 PREDIABETES: ICD-10-CM

## 2025-01-21 PROCEDURE — RXMED WILLOW AMBULATORY MEDICATION CHARGE

## 2025-01-21 NOTE — Clinical Note
Patient doing well on Zepbound, will continue on 2.5 mg as patient reports appetite suppression through whole week

## 2025-01-21 NOTE — PROGRESS NOTES
Clinical Pharmacy Appointment    Patient ID: Arin Phan is a 59 y.o. female who presents for Obesity.    Pt is here for Follow Up appointment.     Referring Provider/PCP: Jimenez Haines DO  Last visit with PCP: 12/10/24   Next visit with PCP: 2/11/25      Subjective     HPI  WEIGHT LOSS  BMI Readings from Last 3 Encounters:   01/15/25 38.79 kg/m²   01/07/25 39.99 kg/m²   12/10/24 40.06 kg/m²      Starting weight: 231 lbs. (12/10)  Current weight: 226 lbs.    Lifestyle (Lower appetite)  Diet: 2 meals/day.  BK: Eggs, turkey sausage  LN: Usually skips  DN: Chicken, eggs, burgers, corn/peas  Snacks: Ritz peanut butter crackers, chips occasionally   Drinks: Crystal Light water  Has worked with nutritionist/dietician? Yes,    Physical Activity: On feet all day (), starting to use treadmill    Other Potential Contributing Factors  Alcohol use: Average 0 drinks/week  Tobacco use: No  Other drug use: No  Medications that may cause weight gain: none  Mental health: No current concerns  Eating Disorders: Denies Hx of any eating disorder  Comorbidities: Comorbidities: sleep apnea not using an appliance  (Had one, but stopped using)      Non-Pharmacological Therapy  Weight loss techniques attempted:  Self-directed dieting: Weight watchers in past    Pharmacological Therapy  Current Medications: Zepbound 2.5 mg on Sundays x 3  Adverse Effects: None  Previous Medications: Ozempic- fatigue, mental fog (through a med spa)    Insurance coverage of weight-loss medications? No,    Eligible for copay cards/programs? Yes,    Eligible for  PAP? Yes, reports income <400% FPL    Medication Estimated Cost Expected weight loss Patient Risks/Cautions Notes   Wegovy (semaglutide) ~$650/month w/ savings card 10-20% None      Zepbound (tirzepatide) $650/month   w/ savings card 10-20%     Qsymia (phentermine-topiramate) $98/month via [facility] [employer] 5-10% None Controlled substance   Contrave (bupropion-naltrexone) $99/month    via [employer] 5-10% None    Adipex (phentermine) ~$15/month 3-5% None Controlled substance,  Short-term use only   Renato (OTC Orlistat) ~$60/month 3-5%  Adverse effects likely outweigh benefit.         Medication Reconciliation:  Changed:   Added:   Discontinued:     Drug Interactions  No relevant drug interactions were noted.    Medication System Management  Patients preferred pharmacy: [facility]  Adherence/Organization: No issues reported  Affordability/Accessibility: Zepbound through [facility] through 12/31/25      Objective   Allergies   Allergen Reactions    House Dust Mite Other     congested, stuffy    Mold Other     congested, stuffy     Social History     Social History Narrative    Not on file      Medication Review  Current Outpatient Medications   Medication Instructions    acetaminophen (TylenoL) 325 mg tablet Every 4 hours    albuterol (Ventolin HFA) 90 mcg/actuation inhaler Inhale.    calcium citrate-vitamin D3 500 mg-12.5 mcg (500 unit) tablet,chewable 1 tablet, 2 times daily    cyanocobalamin (Vitamin B-12) 100 mcg tablet 1 tablet, Daily    DULoxetine (Cymbalta) 30 mg DR capsule Take 1 capsule by mouth daily for 2 weeks. If tolerating increase to 2 capsules by mouth once a day. Do not crush or chew.    Lidocaine Pain Relief 4 % patch APPLY ONE PATCH EXTERNALLY DAILY AS DIRECTED AS NEEDED    methylPREDNISolone (Medrol Dospak) 4 mg tablets Follow schedule on package instructions    multivit-minerals/folic acid (ADULT ONE DAILY MULTIVITAMIN ORAL) Take by mouth. As directed    SUMAtriptan (IMITREX) 50 mg, oral, Once as needed, May repeat after 2 hours.    Zepbound 2.5 mg, subcutaneous, Every 7 days      Vitals  BP Readings from Last 2 Encounters:   01/15/25 140/69   01/07/25 125/81     BMI Readings from Last 1 Encounters:   01/15/25 38.79 kg/m²      Labs  A1C  Lab Results   Component Value Date    HGBA1C 5.7 (H) 10/23/2024    HGBA1C 5.7 03/28/2018     BMP  Lab Results   Component Value Date    CALCIUM  "8.6 10/23/2024     10/23/2024    K 4.0 10/23/2024    CO2 29 10/23/2024     10/23/2024    BUN 17 10/23/2024    CREATININE 0.71 10/23/2024    EGFR >90 10/23/2024     LFTs  Lab Results   Component Value Date    ALT 26 10/23/2024    AST 24 10/23/2024    ALKPHOS 74 10/23/2024    BILITOT 0.4 10/23/2024     FLP  Lab Results   Component Value Date    TRIG 64 10/23/2024    CHOL 201 (H) 10/23/2024    LDLCALC 107 (H) 10/23/2024    HDL 81.0 10/23/2024     Urine Microalbumin  No results found for: \"MICROALBCREA\"  Weight Management  Wt Readings from Last 3 Encounters:   01/15/25 103 kg (226 lb)   01/07/25 106 kg (233 lb)   12/10/24 106 kg (233 lb 6.4 oz)      There is no height or weight on file to calculate BMI.     Assessment/Plan   Problem List Items Addressed This Visit       Obesity, unspecified     Current regimen includes Zepbound. Patient's current weight reported as 226. Has lost 5 lbs (2% of TBW) since starting therapy plan. Due to tolerance of medication, continue dose.    Medication Changes:  CONTINUE  Zepbound 2.5 mg once weekly         Relevant Orders    Referral to Clinical Pharmacy     Other Visit Diagnoses       Weight loss advised        Relevant Orders    Referral to Clinical Pharmacy    Prediabetes        Relevant Orders    Referral to Clinical Pharmacy            Clinical Pharmacist follow-up: 2/18 at 1040, Telehealth visit    Continue all meds under the continuation of care with the referring provider and clinical pharmacy team.    Thank you,  Bella Prieto, PharmD, [facility]  Clinical Pharmacy Specialist  [phone]    Verbal consent to manage patient's drug therapy was obtained from the patient. They were informed they may decline to participate or withdraw from participation in pharmacy services at any time.   "

## 2025-01-21 NOTE — ASSESSMENT & PLAN NOTE
Current regimen includes Zepbound. Patient's current weight reported as 226. Has lost 5 lbs (2% of TBW) since starting therapy plan. Due to tolerance of medication, continue dose.    Medication Changes:  CONTINUE  Zepbound 2.5 mg once weekly

## 2025-01-22 ENCOUNTER — PHARMACY VISIT (OUTPATIENT)
Dept: PHARMACY | Facility: CLINIC | Age: 60
End: 2025-01-22
Payer: COMMERCIAL

## 2025-01-27 DIAGNOSIS — M79.18 MYOFASCIAL PAIN: ICD-10-CM

## 2025-01-28 RX ORDER — DULOXETIN HYDROCHLORIDE 30 MG/1
CAPSULE, DELAYED RELEASE ORAL
Qty: 60 CAPSULE | Refills: 0 | Status: SHIPPED | OUTPATIENT
Start: 2025-01-28

## 2025-01-30 ENCOUNTER — OFFICE VISIT (OUTPATIENT)
Dept: ORTHOPEDIC SURGERY | Facility: HOSPITAL | Age: 60
End: 2025-01-30
Payer: MEDICARE

## 2025-01-30 ENCOUNTER — HOSPITAL ENCOUNTER (OUTPATIENT)
Dept: RADIOLOGY | Facility: HOSPITAL | Age: 60
Discharge: HOME | End: 2025-01-30
Payer: MEDICARE

## 2025-01-30 DIAGNOSIS — M75.101 TEAR OF RIGHT ROTATOR CUFF, UNSPECIFIED TEAR EXTENT, UNSPECIFIED WHETHER TRAUMATIC: Primary | ICD-10-CM

## 2025-01-30 DIAGNOSIS — R29.898 RIGHT ARM WEAKNESS: ICD-10-CM

## 2025-01-30 DIAGNOSIS — M25.511 ACUTE PAIN OF RIGHT SHOULDER: ICD-10-CM

## 2025-01-30 PROCEDURE — 99213 OFFICE O/P EST LOW 20 MIN: CPT | Performed by: ORTHOPAEDIC SURGERY

## 2025-01-30 PROCEDURE — 73030 X-RAY EXAM OF SHOULDER: CPT | Mod: RT

## 2025-01-30 PROCEDURE — 1036F TOBACCO NON-USER: CPT | Performed by: ORTHOPAEDIC SURGERY

## 2025-01-30 ASSESSMENT — ENCOUNTER SYMPTOMS
JOINT SWELLING: 0
SHORTNESS OF BREATH: 0
ARTHRALGIAS: 1
EYE DISCHARGE: 0
WOUND: 0
CHILLS: 0
FEVER: 0
WHEEZING: 0
TROUBLE SWALLOWING: 0
SINUS PAIN: 0

## 2025-01-30 ASSESSMENT — PAIN SCALES - GENERAL: PAINLEVEL_OUTOF10: 4

## 2025-01-30 ASSESSMENT — PAIN - FUNCTIONAL ASSESSMENT: PAIN_FUNCTIONAL_ASSESSMENT: 0-10

## 2025-01-30 NOTE — PROGRESS NOTES
Reason for Appointment  Chief Complaint   Patient presents with    Right Shoulder - Pain     History of Present Illness  Patient is a 59 y.o. female here today for follow-up evaluation of increased right shoulder pain. She has had multiple injections in the past  in both shoulders for impingement syndrome.  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  6 weeks ago she fell onto the right arm and since that time has had increased right shoulder pain.  Difficult for her to do any overhead activity and lifting.  X-rays taken today of the shoulder are reviewed and are normal.  She did see her primary care physician after her fall, she had a course of oral steroids that did help her for a few days pain returned in the shoulder.  No other changes in her past medical history, allergies, or medications.    History reviewed. No pertinent past medical history.    History reviewed. No pertinent surgical history.    Medication Documentation Review Audit       Reviewed by Deborah Childs MA (Medical Assistant) on 01/30/25 at 0850      Medication Order Taking? Sig Documenting Provider Last Dose Status   acetaminophen (TylenoL) 325 mg tablet [id number] Yes every 4 hours. Historical Provider, MD 12/8/2024 Active   albuterol (Ventolin HFA) 90 mcg/actuation inhaler [id number] Yes Inhale. Historical Provider, MD Past Month Active   calcium citrate-vitamin D3 500 mg-12.5 mcg (500 unit) tablet,chewable [id number] Yes Chew 1 tablet 2 times a day. Historical Provider, MD 12/9/2024 Morning Active   cyanocobalamin (Vitamin B-12) 100 mcg tablet [id number] Yes Take 1 tablet (100 mcg) by mouth once daily. Historical Provider, MD 12/9/2024 Morning Active   Discontinued 01/28/25 1104   DULoxetine (Cymbalta) 30 mg DR capsule [id number] Yes TAKE ONE CAPSULE BY MOUTH EVERY DAY for 2 weeks.  if tolerating increase to 2 capsules by mouth once a day.  do not crush or chew. Ish Shelton PA-C  Active   Lidocaine  Pain Relief 4 % patch [id number] Yes APPLY ONE PATCH EXTERNALLY DAILY AS DIRECTED AS NEEDED Historical Provider, MD Past Month Active   multivit-minerals/folic acid (ADULT ONE DAILY MULTIVITAMIN ORAL) [id number] Yes Take by mouth. As directed Historical Provider, MD 12/9/2024 Morning Active   SUMAtriptan (Imitrex) 50 mg tablet [id number] Yes Take 1 tablet (50 mg) by mouth 1 time if needed for migraine. May repeat after 2 hours. Jimenez Haines, DO Past Month Active   tirzepatide, weight loss, (Zepbound) 2.5 mg/0.5 mL injection [id number] Yes Inject 2.5 mg under the skin every 7 days. Jimenez Haines, DO  Active                    Allergies   Allergen Reactions    House Dust Mite Other     congested, stuffy    Mold Other     congested, stuffy       Review of Systems   Constitutional:  Negative for chills and fever.   HENT:  Negative for nosebleeds, sinus pain and trouble swallowing.    Eyes:  Negative for discharge.   Respiratory:  Negative for shortness of breath and wheezing.    Cardiovascular:  Negative for chest pain.   Musculoskeletal:  Positive for arthralgias. Negative for joint swelling.   Skin:  Negative for pallor and wound.   All other systems reviewed and are negative.     Exam   On exam the right shoulder shows painful active forward flexion up to 130 degrees.  She does have weakness with resisted external rotation on the right side compared to the left side.  Markedly positive impingement signs on the right.  minimal symptoms on the left side today.  Deltoid is functional.  Some mild tenderness anteriorly over the biceps tendon sheath.  Good pulses and sensation in the upper extremity.    Assessment   Encounter Diagnosis   Name Primary?    Acute pain of right shoulder    Right rotator cuff tear    Plan   She has had multiple injections in the past and with her recent history of a fall onto that arm with increased pain and weakness on clinical exam, an MRI of the right shoulder is warranted to evaluate  for a rotator cuff tear which could be a surgical entity.  We will follow-up with her after the MRI and discuss further invention at that point.    I, Adriana Grullon PA-C, am acting as a scribe and attest that this documentation has been prepared under the direction and in the presence of Armani Vegas MD.    By signing below, I, Armani Vegas MD, personally performed the services described in this documentation. All medical record entries made by the scribe were at my direction and in my presence. I have reviewed the chart and agree that the record reflects my personal performance and is accurate and complete.

## 2025-02-04 ENCOUNTER — APPOINTMENT (OUTPATIENT)
Dept: RADIOLOGY | Facility: HOSPITAL | Age: 60
End: 2025-02-04
Payer: MEDICARE

## 2025-02-10 ENCOUNTER — DOCUMENTATION (OUTPATIENT)
Dept: PHYSICAL THERAPY | Facility: CLINIC | Age: 60
End: 2025-02-10
Payer: MEDICARE

## 2025-02-10 NOTE — PROGRESS NOTES
Physical Therapy    Discharge Summary    Name: Arin Phan  MRN: [id number]  Date: 2/10/2025    Discharge Information:   Date of discharge 2/10/25  Date of last visit 11/27/24  Date of evaluation 11/20/24  Number of attended visits 2  Referred by Dr. Haines  Referred for Vertigo    Therapy Summary: Pt only attended one follow up session, reporting no dizziness at that time. Failed to continue with therapy,.     Discharge Reason(s): Attendance inconsistent

## 2025-02-11 ENCOUNTER — OFFICE VISIT (OUTPATIENT)
Dept: PRIMARY CARE | Facility: CLINIC | Age: 60
End: 2025-02-11
Payer: MEDICARE

## 2025-02-11 VITALS
HEART RATE: 86 BPM | WEIGHT: 227 LBS | DIASTOLIC BLOOD PRESSURE: 90 MMHG | RESPIRATION RATE: 16 BRPM | TEMPERATURE: 98 F | SYSTOLIC BLOOD PRESSURE: 150 MMHG | BODY MASS INDEX: 38.96 KG/M2 | OXYGEN SATURATION: 98 %

## 2025-02-11 DIAGNOSIS — R73.03 PREDIABETES: ICD-10-CM

## 2025-02-11 DIAGNOSIS — Z00.00 ROUTINE HEALTH MAINTENANCE: Primary | ICD-10-CM

## 2025-02-11 DIAGNOSIS — Z78.0 POSTMENOPAUSAL: ICD-10-CM

## 2025-02-11 DIAGNOSIS — Z53.20 PAP SMEAR OF CERVIX DECLINED: ICD-10-CM

## 2025-02-11 DIAGNOSIS — G44.209 TENSION HEADACHE: ICD-10-CM

## 2025-02-11 DIAGNOSIS — L65.9 HAIR LOSS: ICD-10-CM

## 2025-02-11 PROCEDURE — 99396 PREV VISIT EST AGE 40-64: CPT | Performed by: FAMILY MEDICINE

## 2025-02-11 PROCEDURE — 99213 OFFICE O/P EST LOW 20 MIN: CPT | Performed by: FAMILY MEDICINE

## 2025-02-11 RX ORDER — CYCLOBENZAPRINE HCL 5 MG
5 TABLET ORAL NIGHTLY PRN
Qty: 20 TABLET | Refills: 0 | Status: SHIPPED | OUTPATIENT
Start: 2025-02-11 | End: 2025-03-13

## 2025-02-11 ASSESSMENT — PATIENT HEALTH QUESTIONNAIRE - PHQ9
SUM OF ALL RESPONSES TO PHQ9 QUESTIONS 1 & 2: 0
2. FEELING DOWN, DEPRESSED OR HOPELESS: NOT AT ALL
1. LITTLE INTEREST OR PLEASURE IN DOING THINGS: NOT AT ALL

## 2025-02-11 ASSESSMENT — PAIN SCALES - GENERAL: PAINLEVEL_OUTOF10: 0-NO PAIN

## 2025-02-11 NOTE — PATIENT INSTRUCTIONS
Problem List Items Addressed This Visit    None  Visit Diagnoses         Codes    Routine health maintenance    -  Primary Z00.00    Relevant Orders    Comprehensive Metabolic Panel    Lipid Panel    CBC    TSH with reflex to Free T4 if abnormal    Lipase    Amylase    Hemoglobin A1c    Postmenopausal     Z78.0    Relevant Orders    XR DEXA bone density    Pap smear of cervix declined     Z53.20    Prediabetes     R73.03    Relevant Orders    Comprehensive Metabolic Panel    Lipid Panel    CBC    TSH with reflex to Free T4 if abnormal    Lipase    Amylase    Hemoglobin A1c    Tension headache     G44.209    Relevant Medications    cyclobenzaprine (Flexeril) 5 mg tablet            Additional Visit Plans:  - Complete history and physical examination was performed      GENERAL RECOMMENDATIONS:  - I encourage you to eat a low-fat, moderate-carbohydrate, low-calorie diet to maintain a normal BMI (under 25) to reduce heart disease, and risk for diabetes  - Moderate intensity exercise for 30 minutes 5 days per week is recommended  - Helpful resources recommended by the American Academy of Family Practice can be found at www.familydoctor.org or www.choosemyplate.gov  - Can also consider enrolling in a program such as Weight Watchers or [employer]. The most effective diet is going to be one you can follow long term and turn into a lifestyle. The CDC recommends losing 0.5-2 lbs a week if overweight or obese.  - I recommend a routine vision check and dental cleanings every 6 months      BLOOD TESTING:  - We will obtain routine blood work, please have this done when you are fasting. The office will notify you of the test results once they are available and make any treatment recommendations accordingly  - Blood work may include a cholesterol and diabetes screen if risk factors exist (overweight, high blood pressure etc); screening for sexually transmitted infections; and Hepatitis C Virus screening      VACCINATION  RECOMMENDATIONS:  - Flu shot annually.  Up-to-date  - Tetanus booster every 10 years.  Up-to-date, next due 2028  - Two pneumonia vaccinations starting at 65 years old (or earlier if risk factors - smoker, diabetic, heart or lung conditions) -up-to-date, next due at age 65  - Shingles vaccine for those 50 years or older -up-to-date      SCREENING RECOMMENDATIONS:  - Cervical cancer screening (pap test) in women starting at age 21 until age 65 years old.  - Mammogram screening for breast cancer in women starting at 40-50 years and every 1-2 years -up-to-date  - Bone density screening (DEXA) for osteoporosis in women aged 65 years and older (in younger women who are higher risk) -due at this time  - Colon cancer screening (with colonoscopy or Cologuard) for men and women starting at age 45 until 74 years old (or earlier if family history of colon cancer) - up to date will try to get records.     Look into the Healthy Start program at .     Plan to try a muscle relaxer at night to help with the muscle tension while you get ready to go for the MRI.     Next Wellness Exam/Annual Physical Due  In 1 year from today    Patient Care Team:  Jimenez Haines DO as PCP - General (Family Medicine)  Alejandrina Hodge MD as PCP - [facility] PCP  MD Alejandrina Hansen MD as Primary Care Provider  Bella Prieto, PharmD as Pharmacist (Pharmacy)    Jimenez Haines DO   02/11/25   1:15 PM

## 2025-02-11 NOTE — PROGRESS NOTES
Chief Complaint   Patient presents with    Annual Exam       HPI:  Arin Phan is a 59 y.o. female here  for a complete physical.    Having neck tension that is creating for middle of the night headaches that keep her up a few hours.     Health Maintenance.  Immunizations: Shingrix completed.  Tdap is due 2028.  Pneumococcal vaccination is up-to-date, next due at age 66.  Influenza received.    Denies smoking or illicit drug use, drinks 1 alcoholic beverages a month or less. Patient reports routine vision checks and dental cleanings, and regular exercise with walking. Patient is fasting for routine blood work today.  She had blood work done in October, is due to recheck labs with being on a GLP-1 agonist.    Screening colonoscopy done 8 years ago, Dr. Amin?  Screening pap declined and understands there can be conditions not found if not looking, screening mammogram is up-to-date.  Bone density is due    Otherwise denies fevers, chills, cold/flu symptoms, SOB, CP, N/V, abdominal pain, dysuria, hematuria, melena, diarrhea or LE edema      PHQ9/GAD7:         Patient Active Problem List    Diagnosis Date Noted    Obesity, unspecified 01/02/2024    Arthritis of left acromioclavicular joint 11/02/2023    Asthma 11/02/2023    Cervical nerve root impingement 11/02/2023    Chronic rhinitis 11/02/2023    DDD (degenerative disc disease), cervical 11/02/2023    Spondylosis without myelopathy or radiculopathy, cervical region 11/02/2023    Depression with anxiety 11/02/2023    Grief 11/02/2023    Intestinal malabsorption 11/02/2023    Hyperparathyroidism (Multi) 11/02/2023    Mild intermittent asthma without complication (HHS-HCC) 11/02/2023    Mild persistent asthma without complication (HHS-HCC) 11/02/2023    Morbid (severe) obesity due to excess calories (Multi) 11/02/2023    Obstructive sleep apnea syndrome 11/02/2023    Ocular migraine 11/02/2023    Other chronic pain 11/02/2023    Other headache syndrome  11/02/2023    Primary osteoarthritis, right shoulder 11/02/2023    Overflow incontinence 11/02/2023    Stress incontinence (female) (male) 11/02/2023    Tinnitus of both ears 11/02/2023    Vitamin D deficiency 11/02/2023    Sleep apnea 12/08/2016    Eating disorder 10/10/2016    Psychological stress 10/10/2016    Restless leg 08/10/2016    Radial tunnel syndrome 03/23/2015    Osteoarthritis of knee 02/17/2015    Primary osteoarthritis of right knee 02/17/2015    Low back pain 07/09/2003    Osteoarthritis 07/09/2003        History reviewed. No pertinent past medical history.     Current Medications  Current Outpatient Medications   Medication Instructions    acetaminophen (TylenoL) 325 mg tablet Every 4 hours    albuterol (Ventolin HFA) 90 mcg/actuation inhaler Inhale.    calcium citrate-vitamin D3 500 mg-12.5 mcg (500 unit) tablet,chewable 1 tablet, 2 times daily    cyanocobalamin (Vitamin B-12) 100 mcg tablet 1 tablet, Daily    cyclobenzaprine (FLEXERIL) 5 mg, oral, Nightly PRN    DULoxetine (Cymbalta) 30 mg DR capsule TAKE ONE CAPSULE BY MOUTH EVERY DAY for 2 weeks.  if tolerating increase to 2 capsules by mouth once a day.  do not crush or chew.    Lidocaine Pain Relief 4 % patch APPLY ONE PATCH EXTERNALLY DAILY AS DIRECTED AS NEEDED    multivit-minerals/folic acid (ADULT ONE DAILY MULTIVITAMIN ORAL) Take by mouth. As directed    SUMAtriptan (IMITREX) 50 mg, oral, Once as needed, May repeat after 2 hours.    Zepbound 2.5 mg, subcutaneous, Every 7 days        Allergies  Allergies   Allergen Reactions    House Dust Mite Other     congested, stuffy    Mold Other     congested, stuffy        Immunizations  Immunization History   Administered Date(s) Administered    Flu vaccine (IIV4), preservative free *Check age/dose* 11/09/2016, 09/29/2021, 01/08/2024    Flu vaccine, trivalent, preservative free, age 6 months and greater (Fluarix/Fluzone/Flulaval) 10/18/2024    Influenza, Unspecified 10/14/2013, 10/30/2015     Influenza, injectable, MDCK, quadrivalent 09/26/2017    Influenza, injectable, quadrivalent 12/21/2018, 12/10/2019, 10/28/2020, 11/21/2022    Influenza, seasonal, injectable 10/30/2015, 09/08/2016    Moderna SARS-CoV-2 Vaccination 03/16/2021, 04/13/2021, 12/07/2021    Pneumococcal polysaccharide vaccine, 23-valent, age 2 years and older (PNEUMOVAX 23) 08/27/2014    Tdap vaccine, age 7 year and older (BOOSTRIX, ADACEL) 03/05/2018    Zoster vaccine, recombinant, adult (SHINGRIX) 10/28/2020, 12/30/2020        History reviewed. No pertinent surgical history.  Family History   Problem Relation Name Age of Onset    Heart disease Mother 81     Coronary artery disease Mother 81     Other (s/p cabg) Mother 81     Hypertension Father 81     Obesity Father 81     Atrial fibrillation Father 81     Heart failure Father 81     Other (CRF) Father 81     Other (CVA) Brother 59     Breast cancer Maternal Grandmother      Breast cancer Paternal Grandmother       Social History     Tobacco Use    Smoking status: Never    Smokeless tobacco: Never   Substance Use Topics    Alcohol use: Not Currently     Comment: Has a drink every niw and the    Drug use: Never     Tobacco Use: Low Risk  (2/11/2025)    Patient History     Smoking Tobacco Use: Never     Smokeless Tobacco Use: Never     Passive Exposure: Not on file        ROS  All pertinent positive symptoms are included in the history of present illness.  All other systems have been reviewed and are negative and noncontributory to this patient's current ailments.    VITAL SIGNS  Vitals:    02/11/25 1300   BP: 150/90   Pulse: 86   Resp: 16   Temp: 36.7 °C (98 °F)   SpO2: 98%     Vitals:    02/11/25 1300   Weight: 103 kg (227 lb)      Body mass index is 38.96 kg/m².     PHYSICAL EXAM  GENERAL APPEARANCE: well nourished, well developed, looks like stated age, in no acute distress, not ill or tired appearing, conversing well.   HEENT: no trauma, normocephalic. PERRLA and EOMI with normal  external exam. TM's intact with no injection or effusion, no signs of infection.   NECK: no nodes, supple without rigidity, no neck mass was observed, no thyromegaly or thyroid nodules.   HEART: regular rate and rhythm, S1 and S2 heard with no murmurs or skipped beats, no carotid bruits.   LUNGS: clear to auscultation bilaterally with no wheezes, crackles or rales.   ABDOMEN: no organomegaly, soft, nontender, nondistended, normal bowel sounds, no guarding/rebound/rigidity.   EXTREMITIES: moving all extremities equally with no edema or deformities.   SKIN: normal skin color and pigmentation, normal skin turgor without rash, lesions, or nodules visualized.   NEUROLOGIC EXAM: CN II-XII grossly intact, normal gait, normal balance, 5/5 muscle strength  PSYCH: mood and affect appropriate; alert and oriented to time, place, person; no difficulty with speech or language.   LYMPH NODES: no cervical lymphadenopathy.         Counseling:       Medication education:           Education:  The patient is counseled regarding potential side-effects of all new medications          Understanding:  Patient expressed understanding of information conveyed today          Adherence:  No barriers to adherence identified      Assessment/Plan   Problem List Items Addressed This Visit    None  Visit Diagnoses         Codes    Routine health maintenance    -  Primary Z00.00    Relevant Orders    Comprehensive Metabolic Panel    Lipid Panel    CBC    TSH with reflex to Free T4 if abnormal    Lipase    Amylase    Hemoglobin A1c    Postmenopausal     Z78.0    Relevant Orders    XR DEXA bone density    Pap smear of cervix declined     Z53.20    Prediabetes     R73.03    Relevant Orders    Comprehensive Metabolic Panel    Lipid Panel    CBC    TSH with reflex to Free T4 if abnormal    Lipase    Amylase    Hemoglobin A1c    Tension headache     G44.209    Relevant Medications    cyclobenzaprine (Flexeril) 5 mg tablet            Additional Visit  Plans:  - Complete history and physical examination was performed      GENERAL RECOMMENDATIONS:  - I encourage you to eat a low-fat, moderate-carbohydrate, low-calorie diet to maintain a normal BMI (under 25) to reduce heart disease, and risk for diabetes  - Moderate intensity exercise for 30 minutes 5 days per week is recommended  - Helpful resources recommended by the American Academy of Family Practice can be found at www.familydoctor.org or www.choosemyplate.gov  - Can also consider enrolling in a program such as Weight Watchers or Didi Moore. The most effective diet is going to be one you can follow long term and turn into a lifestyle. The CDC recommends losing 0.5-2 lbs a week if overweight or obese.  - I recommend a routine vision check and dental cleanings every 6 months      BLOOD TESTING:  - We will obtain routine blood work, please have this done when you are fasting. The office will notify you of the test results once they are available and make any treatment recommendations accordingly  - Blood work may include a cholesterol and diabetes screen if risk factors exist (overweight, high blood pressure etc); screening for sexually transmitted infections; and Hepatitis C Virus screening      VACCINATION RECOMMENDATIONS:  - Flu shot annually.  Up-to-date  - Tetanus booster every 10 years.  Up-to-date, next due 2028  - Two pneumonia vaccinations starting at 65 years old (or earlier if risk factors - smoker, diabetic, heart or lung conditions) -up-to-date, next due at age 65  - Shingles vaccine for those 50 years or older -up-to-date      SCREENING RECOMMENDATIONS:  - Cervical cancer screening (pap test) in women starting at age 21 until age 65 years old.  - Mammogram screening for breast cancer in women starting at 40-50 years and every 1-2 years -up-to-date  - Bone density screening (DEXA) for osteoporosis in women aged 65 years and older (in younger women who are higher risk) -due at this time  - Colon  cancer screening (with colonoscopy or Cologuard) for men and women starting at age 45 until 74 years old (or earlier if family history of colon cancer) - up to date will try to get records.     Look into the Healthy Start program at .     Plan to try a muscle relaxer at night to help with the muscle tension while you get ready to go for the MRI.     Next Wellness Exam/Annual Physical Due  In 1 year from today    Patient Care Team:  Jimenez Haines DO as PCP - General (Family Medicine)  Alejandrina Hodge MD as PCP - [facility] PCP  MD Alejandrina Hansen MD as Primary Care Provider  Bella Prieto, PharmD as Pharmacist (Pharmacy)    Jimenez Haines DO   02/11/25   1:15 PM

## 2025-02-12 ENCOUNTER — APPOINTMENT (OUTPATIENT)
Dept: RADIOLOGY | Facility: CLINIC | Age: 60
End: 2025-02-12
Payer: MEDICARE

## 2025-02-12 ENCOUNTER — OFFICE VISIT (OUTPATIENT)
Dept: DERMATOLOGY | Facility: CLINIC | Age: 60
End: 2025-02-12
Payer: MEDICARE

## 2025-02-12 DIAGNOSIS — L64.9 ANDROGENETIC ALOPECIA: Primary | ICD-10-CM

## 2025-02-12 PROCEDURE — 99203 OFFICE O/P NEW LOW 30 MIN: CPT

## 2025-02-12 RX ORDER — MINOXIDIL 2.5 MG/1
1.25 TABLET ORAL DAILY
Qty: 15 TABLET | Refills: 4 | Status: SHIPPED | OUTPATIENT
Start: 2025-02-12 | End: 2026-02-12

## 2025-02-12 NOTE — PROGRESS NOTES
Subjective     Arin Phan is a 59 y.o. female who presents for the following: Alopecia. Patient is here today to discuss hair loss ongoing for the past couple of years. Also reports losing eyelashes. This started more recently within the past couple of weeks. Started Zepbound 6-7 weeks ago and is wondering if this could be contributing. Reports a family history of hair loss with her dad and uncles as they have gotten older. Denies itching or pain on the scalp. Also reports excessive hair growth on the chin- has been waxing.       Review of Systems:  No other skin or systemic complaints other than what is documented elsewhere in the note.    The following portions of the chart were reviewed this encounter and updated as appropriate:   Allergies  Meds         Skin Cancer History  No skin cancer on file.      Specialty Problems    None       Objective   Well appearing patient in no apparent distress; mood and affect are within normal limits.    A focused skin examination was performed. All findings within normal limits unless otherwise noted below.    Assessment/Plan   1. Androgenetic alopecia  Scalp  Decreased hair density in androgenetic areas. On dermoscopy, miniaturized hair follicles are seen and hair shafts of varying diameters are noted.    -Discussed the mechanism of action of androgenetic alopecia, including the slowly progressive nature of the condition and the need for any therapy to be continued long term to maintain results.  -Discussed treatment options including topical and oral therapies.     Recommend:    -Start Minoxidil 1.25 mg daily. The risks, benefits, and side effects of the medication, including hypertrichosis, lightheadedness, and rapid heartbeat, were discussed with the patient today.   -Her blood pressure at today's visit was 111/73.     -Follow up in 4-5 months.     Related Medications  minoxidil (Loniten) 2.5 mg tablet  Take 0.5 tablets (1.25 mg) by mouth once  daily.

## 2025-02-18 ENCOUNTER — APPOINTMENT (OUTPATIENT)
Dept: PHARMACY | Facility: HOSPITAL | Age: 60
End: 2025-02-18
Payer: MEDICARE

## 2025-02-18 ENCOUNTER — HOSPITAL ENCOUNTER (OUTPATIENT)
Dept: RADIOLOGY | Facility: HOSPITAL | Age: 60
Discharge: HOME | End: 2025-02-18
Payer: MEDICARE

## 2025-02-18 DIAGNOSIS — R73.03 PREDIABETES: ICD-10-CM

## 2025-02-18 DIAGNOSIS — R29.898 RIGHT ARM WEAKNESS: ICD-10-CM

## 2025-02-18 DIAGNOSIS — E66.813 CLASS 3 SEVERE OBESITY DUE TO EXCESS CALORIES WITH SERIOUS COMORBIDITY AND BODY MASS INDEX (BMI) OF 40.0 TO 44.9 IN ADULT: ICD-10-CM

## 2025-02-18 DIAGNOSIS — Z78.9 WEIGHT LOSS ADVISED: ICD-10-CM

## 2025-02-18 DIAGNOSIS — M75.101 TEAR OF RIGHT ROTATOR CUFF, UNSPECIFIED TEAR EXTENT, UNSPECIFIED WHETHER TRAUMATIC: ICD-10-CM

## 2025-02-18 DIAGNOSIS — E66.01 CLASS 3 SEVERE OBESITY DUE TO EXCESS CALORIES WITH SERIOUS COMORBIDITY AND BODY MASS INDEX (BMI) OF 40.0 TO 44.9 IN ADULT: ICD-10-CM

## 2025-02-18 LAB
ALBUMIN SERPL-MCNC: 3.8 G/DL (ref 3.6–5.1)
ALP SERPL-CCNC: 80 U/L (ref 37–153)
ALT SERPL-CCNC: 20 U/L (ref 6–29)
AMYLASE SERPL-CCNC: 55 U/L (ref 21–101)
ANION GAP SERPL CALCULATED.4IONS-SCNC: 8 MMOL/L (CALC) (ref 7–17)
AST SERPL-CCNC: 24 U/L (ref 10–35)
BILIRUB SERPL-MCNC: 0.3 MG/DL (ref 0.2–1.2)
BUN SERPL-MCNC: 19 MG/DL (ref 7–25)
CALCIUM SERPL-MCNC: 9.3 MG/DL (ref 8.6–10.4)
CHLORIDE SERPL-SCNC: 105 MMOL/L (ref 98–110)
CHOLEST SERPL-MCNC: 197 MG/DL
CHOLEST/HDLC SERPL: 2.6 (CALC)
CO2 SERPL-SCNC: 29 MMOL/L (ref 20–32)
CREAT SERPL-MCNC: 0.65 MG/DL (ref 0.5–1.03)
EGFRCR SERPLBLD CKD-EPI 2021: 102 ML/MIN/1.73M2
ERYTHROCYTE [DISTWIDTH] IN BLOOD BY AUTOMATED COUNT: 12.5 % (ref 11–15)
EST. AVERAGE GLUCOSE BLD GHB EST-MCNC: 108 MG/DL
EST. AVERAGE GLUCOSE BLD GHB EST-SCNC: 6 MMOL/L
GLUCOSE SERPL-MCNC: 82 MG/DL (ref 65–99)
HBA1C MFR BLD: 5.4 % OF TOTAL HGB
HCT VFR BLD AUTO: 39.9 % (ref 35–45)
HDLC SERPL-MCNC: 76 MG/DL
HGB BLD-MCNC: 12.8 G/DL (ref 11.7–15.5)
LDLC SERPL CALC-MCNC: 106 MG/DL (CALC)
LIPASE SERPL-CCNC: 80 U/L (ref 7–60)
MCH RBC QN AUTO: 30.6 PG (ref 27–33)
MCHC RBC AUTO-ENTMCNC: 32.1 G/DL (ref 32–36)
MCV RBC AUTO: 95.5 FL (ref 80–100)
NONHDLC SERPL-MCNC: 121 MG/DL (CALC)
PLATELET # BLD AUTO: 289 THOUSAND/UL (ref 140–400)
PMV BLD REES-ECKER: 10.3 FL (ref 7.5–12.5)
POTASSIUM SERPL-SCNC: 4.3 MMOL/L (ref 3.5–5.3)
PROT SERPL-MCNC: 6.3 G/DL (ref 6.1–8.1)
RBC # BLD AUTO: 4.18 MILLION/UL (ref 3.8–5.1)
SODIUM SERPL-SCNC: 142 MMOL/L (ref 135–146)
TRIGL SERPL-MCNC: 61 MG/DL
TSH SERPL-ACNC: 1.45 MIU/L (ref 0.4–4.5)
WBC # BLD AUTO: 4.9 THOUSAND/UL (ref 3.8–10.8)

## 2025-02-18 PROCEDURE — 73221 MRI JOINT UPR EXTREM W/O DYE: CPT | Mod: RT

## 2025-02-18 PROCEDURE — RXMED WILLOW AMBULATORY MEDICATION CHARGE

## 2025-02-18 RX ORDER — TIRZEPATIDE 5 MG/.5ML
5 INJECTION, SOLUTION SUBCUTANEOUS
Qty: 4 EACH | Refills: 11 | Status: SHIPPED | OUTPATIENT
Start: 2025-02-18

## 2025-02-18 NOTE — PROGRESS NOTES
Clinical Pharmacy Appointment    Patient ID: Arin Phan is a 59 y.o. female who presents for Obesity.    Pt is here for Follow Up appointment.     Referring Provider/PCP: Jimenez Haines DO  Last visit with PCP: 2/11/25  Next visit with PCP: 3/6/25      Subjective     HPI  WEIGHT LOSS  BMI Readings from Last 3 Encounters:   02/11/25 38.96 kg/m²   01/15/25 38.79 kg/m²   01/07/25 39.99 kg/m²      Starting weight: 231 lbs. (12/10)  Current weight: 226 lbs.    Lifestyle (Lower appetite)  Diet: 2 meals/day.  BK: Eggs, turkey sausage  LN: Usually skips  DN: Chicken, eggs, burgers, corn/peas  Snacks: Ritz peanut butter crackers, chips occasionally   Drinks: Crystal Light water  Has worked with nutritionist/dietician? Yes,    Physical Activity: On feet all day (), starting to use treadmill    Other Potential Contributing Factors  Alcohol use: Average 0 drinks/week  Tobacco use: No  Other drug use: No  Medications that may cause weight gain: none  Mental health: No current concerns  Eating Disorders: Denies Hx of any eating disorder  Comorbidities: Comorbidities: sleep apnea not using an appliance  (Had one, but stopped using)      Non-Pharmacological Therapy  Weight loss techniques attempted:  Self-directed dieting: Weight watchers in past    Pharmacological Therapy  Current Medications: Zepbound 2.5 mg on Sundays  Adverse Effects: None  Previous Medications: Ozempic- fatigue, mental fog (through a med spa)    Insurance coverage of weight-loss medications? No,    Eligible for copay cards/programs? Yes,    Eligible for  PAP? Yes, reports income <400% FPL    Medication Estimated Cost Expected weight loss Patient Risks/Cautions Notes   Wegovy (semaglutide) ~$650/month w/ savings card 10-20% None      Zepbound (tirzepatide) $650/month   w/ savings card 10-20%     Qsymia (phentermine-topiramate) $98/month via [facility]symia [employer] 5-10% None Controlled substance   Contrave (bupropion-naltrexone) $99/month   via  [employer] 5-10% None    Adipex (phentermine) ~$15/month 3-5% None Controlled substance,  Short-term use only   Renato (OTC Orlistat) ~$60/month 3-5%  Adverse effects likely outweigh benefit.         Medication Reconciliation:  Changed:   Added:   Discontinued:     Drug Interactions  No relevant drug interactions were noted.    Medication System Management  Patients preferred pharmacy: [facility]  Adherence/Organization: No issues reported  Affordability/Accessibility: Zepbound through [facility] through 12/31/25      Objective   Allergies   Allergen Reactions    House Dust Mite Other     congested, stuffy    Mold Other     congested, stuffy     Social History     Social History Narrative    Not on file      Medication Review  Current Outpatient Medications   Medication Instructions    acetaminophen (TylenoL) 325 mg tablet Every 4 hours    albuterol (Ventolin HFA) 90 mcg/actuation inhaler Inhale.    calcium citrate-vitamin D3 500 mg-12.5 mcg (500 unit) tablet,chewable 1 tablet, 2 times daily    cyanocobalamin (Vitamin B-12) 100 mcg tablet 1 tablet, Daily    cyclobenzaprine (FLEXERIL) 5 mg, oral, Nightly PRN    DULoxetine (Cymbalta) 30 mg DR capsule TAKE ONE CAPSULE BY MOUTH EVERY DAY for 2 weeks.  if tolerating increase to 2 capsules by mouth once a day.  do not crush or chew.    Lidocaine Pain Relief 4 % patch APPLY ONE PATCH EXTERNALLY DAILY AS DIRECTED AS NEEDED    minoxidil (LONITEN) 1.25 mg, oral, Daily    multivit-minerals/folic acid (ADULT ONE DAILY MULTIVITAMIN ORAL) Take by mouth. As directed    SUMAtriptan (IMITREX) 50 mg, oral, Once as needed, May repeat after 2 hours.    Zepbound 5 mg, subcutaneous, Every 7 days      Vitals  BP Readings from Last 2 Encounters:   02/11/25 150/90   01/15/25 140/69     BMI Readings from Last 1 Encounters:   02/11/25 38.96 kg/m²      Labs  A1C  Lab Results   Component Value Date    HGBA1C 5.4 02/17/2025    HGBA1C 5.7 (H) 10/23/2024    HGBA1C 5.7 03/28/2018     BMP  Lab Results  "  Component Value Date    CALCIUM 9.3 02/17/2025     02/17/2025    K 4.3 02/17/2025    CO2 29 02/17/2025     02/17/2025    BUN 19 02/17/2025    CREATININE 0.65 02/17/2025    EGFR 102 02/17/2025     LFTs  Lab Results   Component Value Date    ALT 20 02/17/2025    AST 24 02/17/2025    ALKPHOS 80 02/17/2025    BILITOT 0.3 02/17/2025     FLP  Lab Results   Component Value Date    TRIG 61 02/17/2025    CHOL 197 02/17/2025    LDLCALC 106 (H) 02/17/2025    HDL 76 02/17/2025     Urine Microalbumin  No results found for: \"MICROALBCREA\"  Weight Management  Wt Readings from Last 3 Encounters:   02/11/25 103 kg (227 lb)   01/15/25 103 kg (226 lb)   01/07/25 106 kg (233 lb)      There is no height or weight on file to calculate BMI.     Assessment/Plan   Problem List Items Addressed This Visit       Obesity, unspecified     Current regimen includes Zepbound. Patient's current weight reported as 226. Has lost 5 lbs (2% of TBW) since starting therapy plan. Due to tolerance of medication, increase dose.    Medication Changes:  INCREASE  Zepbound 2.5 to 5 mg once weekly         Relevant Medications    tirzepatide, weight loss, (Zepbound) 5 mg/0.5 mL injection    Other Relevant Orders    Referral to Clinical Pharmacy     Other Visit Diagnoses       Weight loss advised        Relevant Orders    Referral to Clinical Pharmacy    Prediabetes        Relevant Orders    Referral to Clinical Pharmacy          Clinical Pharmacist follow-up: 3/18 at 1040, Telehealth visit    Continue all meds under the continuation of care with the referring provider and clinical pharmacy team.    Thank you,  Bella Prieto, PharmD, [facility]  Clinical Pharmacy Specialist  [phone]    Verbal consent to manage patient's drug therapy was obtained from the patient. They were informed they may decline to participate or withdraw from participation in pharmacy services at any time.   "

## 2025-02-18 NOTE — Clinical Note
Patient tolerating Zepbound well, wanted a second month of 2.5 mg, but weight loss stalled, will increase to 5 mg at this time.

## 2025-02-18 NOTE — ASSESSMENT & PLAN NOTE
Current regimen includes Zepbound. Patient's current weight reported as 226. Has lost 5 lbs (2% of TBW) since starting therapy plan. Due to tolerance of medication, increase dose.    Medication Changes:  INCREASE  Zepbound 2.5 to 5 mg once weekly

## 2025-02-19 ENCOUNTER — APPOINTMENT (OUTPATIENT)
Dept: NEUROLOGY | Facility: CLINIC | Age: 60
End: 2025-02-19
Payer: MEDICARE

## 2025-02-19 ENCOUNTER — APPOINTMENT (OUTPATIENT)
Dept: RADIOLOGY | Facility: CLINIC | Age: 60
End: 2025-02-19
Payer: MEDICARE

## 2025-02-20 ENCOUNTER — APPOINTMENT (OUTPATIENT)
Dept: RADIOLOGY | Facility: HOSPITAL | Age: 60
End: 2025-02-20
Payer: MEDICARE

## 2025-02-20 ENCOUNTER — PHARMACY VISIT (OUTPATIENT)
Dept: PHARMACY | Facility: CLINIC | Age: 60
End: 2025-02-20
Payer: COMMERCIAL

## 2025-02-25 ENCOUNTER — APPOINTMENT (OUTPATIENT)
Dept: NEUROLOGY | Facility: CLINIC | Age: 60
End: 2025-02-25
Payer: MEDICARE

## 2025-03-04 ENCOUNTER — APPOINTMENT (OUTPATIENT)
Dept: PRIMARY CARE | Facility: CLINIC | Age: 60
End: 2025-03-04
Payer: MEDICARE

## 2025-03-06 ENCOUNTER — OFFICE VISIT (OUTPATIENT)
Dept: ORTHOPEDIC SURGERY | Facility: HOSPITAL | Age: 60
End: 2025-03-06
Payer: MEDICARE

## 2025-03-06 ENCOUNTER — APPOINTMENT (OUTPATIENT)
Dept: PRIMARY CARE | Facility: CLINIC | Age: 60
End: 2025-03-06
Payer: MEDICARE

## 2025-03-06 DIAGNOSIS — M75.121 COMPLETE TEAR OF RIGHT ROTATOR CUFF, UNSPECIFIED WHETHER TRAUMATIC: Primary | ICD-10-CM

## 2025-03-06 PROCEDURE — 1036F TOBACCO NON-USER: CPT | Performed by: ORTHOPAEDIC SURGERY

## 2025-03-06 PROCEDURE — 99213 OFFICE O/P EST LOW 20 MIN: CPT | Performed by: ORTHOPAEDIC SURGERY

## 2025-03-06 PROCEDURE — L3670 SO ACRO/CLAV CAN WEB PRE OTS: HCPCS | Performed by: ORTHOPAEDIC SURGERY

## 2025-03-06 RX ORDER — CEFAZOLIN SODIUM 2 G/100ML
2 INJECTION, SOLUTION INTRAVENOUS ONCE
OUTPATIENT
Start: 2025-03-06 | End: 2025-03-06

## 2025-03-06 ASSESSMENT — ENCOUNTER SYMPTOMS
JOINT SWELLING: 0
DIAPHORESIS: 0
TROUBLE SWALLOWING: 0
SHORTNESS OF BREATH: 0
EYE DISCHARGE: 0
ARTHRALGIAS: 1
SINUS PRESSURE: 0
FEVER: 0
WOUND: 0
WHEEZING: 0

## 2025-03-06 ASSESSMENT — PAIN - FUNCTIONAL ASSESSMENT: PAIN_FUNCTIONAL_ASSESSMENT: 0-10

## 2025-03-06 ASSESSMENT — PAIN SCALES - GENERAL: PAINLEVEL_OUTOF10: 7

## 2025-03-06 NOTE — PROGRESS NOTES
Reason for Appointment  No chief complaint on file.    History of Present Illness  Patient is a 59 y.o. female here today for follow-up evaluation of continued right shoulder pain.  She has had multiple injections in the past, these have only given her short-term relief and she recently had a fall onto the right arm 2 months ago.  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  Recent MRI of the shoulder is reviewed with the report and shows a full-thickness tear of the rotator cuff with some tearing of the labrum, moderate to severe AC joint arthritis and biceps tendinosis.  She would like to discuss operative treatment today.  No other changes in her past medical history, allergies, or medications.    No past medical history on file.    No past surgical history on file.    Medication Documentation Review Audit       Reviewed by AV Lee (Nurse Practitioner) on 02/12/25 at 1140      Medication Order Taking? Sig Documenting Provider Last Dose Status   acetaminophen (TylenoL) 325 mg tablet [id number] No every 4 hours. Historical Provider, MD 12/8/2024 Active   albuterol (Ventolin HFA) 90 mcg/actuation inhaler [id number] No Inhale. Historical Provider, MD Past Month Active   calcium citrate-vitamin D3 500 mg-12.5 mcg (500 unit) tablet,chewable [id number] No Chew 1 tablet 2 times a day. Kamlesh Provider, MD 12/9/2024 Morning Active   cyanocobalamin (Vitamin B-12) 100 mcg tablet [id number] No Take 1 tablet (100 mcg) by mouth once daily. Historical Provider, MD 12/9/2024 Morning Active   cyclobenzaprine (Flexeril) 5 mg tablet [id number]  Take 1 tablet (5 mg) by mouth as needed at bedtime for muscle spasms. Jimenez Haines, DO  Active   DULoxetine (Cymbalta) 30 mg DR capsule [id number]  TAKE ONE CAPSULE BY MOUTH EVERY DAY for 2 weeks.  if tolerating increase to 2 capsules by mouth once a day.  do not crush or chew. Ish Shelton,  SAURABH  Active   Lidocaine Pain Relief 4 % patch [id number] No APPLY ONE PATCH EXTERNALLY DAILY AS DIRECTED AS NEEDED   Patient not taking: Reported on 2/11/2025    Historical Provider, MD Past Month Active   multivit-minerals/folic acid (ADULT ONE DAILY MULTIVITAMIN ORAL) [id number] No Take by mouth. As directed Historical Provider, MD 12/9/2024 Morning Active   SUMAtriptan (Imitrex) 50 mg tablet [id number] No Take 1 tablet (50 mg) by mouth 1 time if needed for migraine. May repeat after 2 hours.   Patient not taking: Reported on 2/11/2025    Jimenez Haines DO Past Month Active   tirzepatide, weight loss, (Zepbound) 2.5 mg/0.5 mL injection [id number]  Inject 2.5 mg under the skin every 7 days. Jimenez Haines,   Active                    Allergies   Allergen Reactions    House Dust Mite Other     congested, stuffy    Mold Other     congested, stuffy       Review of Systems   Constitutional:  Negative for diaphoresis and fever.   HENT:  Negative for nosebleeds, sinus pressure and trouble swallowing.    Eyes:  Negative for discharge.   Respiratory:  Negative for shortness of breath and wheezing.    Cardiovascular:  Negative for chest pain.   Musculoskeletal:  Positive for arthralgias. Negative for joint swelling.   Skin:  Negative for rash and wound.   All other systems reviewed and are negative.    Exam   On exam the right shoulder shows pain with 530 degrees of active forward flexion again today, she has significant weakness on the right side compared to the left side.  Positive impingement signs, she does have some tenderness anteriorly over the biceps tendon sheath mild tenderness over the AC joint.  Pain with cross body adduction and an open palm resistance test.  Deltoid is functional.  Good pulses and sensation in the upper extremity.    Assessment   Right rotator cuff tear    Plan   We discussed operative treatment today, she has failed conservative treatment with multiple injections and oral steroids that  have not helped her in with a full-thickness rotator cuff tear at her younger age, surgical intervention is warranted.  She understands the risk of surgery including nerve, artery, and tendon damage, infection, continued pain, need for future surgery and the lengthy recovery time needed.  She will call and schedule a right shoulder arthroscopic rotator cuff repair with possible Tania and possible biceps tenodesis.    I, Adriana Grullon PA-C, am acting as a scribe and attest that this documentation has been prepared under the direction and in the presence of Armani Vegas MD.    By signing below, I, Armani Vegas MD, personally performed the services described in this documentation. All medical record entries made by the scribe were at my direction and in my presence. I have reviewed the chart and agree that the record reflects my personal performance and is accurate and complete.

## 2025-03-11 ENCOUNTER — HOSPITAL ENCOUNTER (OUTPATIENT)
Dept: RADIOLOGY | Facility: HOSPITAL | Age: 60
Discharge: HOME | End: 2025-03-11
Payer: MEDICARE

## 2025-03-11 DIAGNOSIS — Z78.0 POSTMENOPAUSAL: ICD-10-CM

## 2025-03-11 PROBLEM — M75.121 COMPLETE TEAR OF RIGHT ROTATOR CUFF: Status: ACTIVE | Noted: 2025-03-06

## 2025-03-11 PROCEDURE — 77080 DXA BONE DENSITY AXIAL: CPT

## 2025-03-12 ENCOUNTER — PREP FOR PROCEDURE (OUTPATIENT)
Dept: ORTHOPEDIC SURGERY | Facility: CLINIC | Age: 60
End: 2025-03-12
Payer: MEDICARE

## 2025-03-12 DIAGNOSIS — M75.121 COMPLETE TEAR OF RIGHT ROTATOR CUFF, UNSPECIFIED WHETHER TRAUMATIC: Primary | ICD-10-CM

## 2025-03-18 ENCOUNTER — APPOINTMENT (OUTPATIENT)
Dept: PHARMACY | Facility: HOSPITAL | Age: 60
End: 2025-03-18
Payer: MEDICARE

## 2025-03-18 DIAGNOSIS — R73.03 PREDIABETES: ICD-10-CM

## 2025-03-18 DIAGNOSIS — E66.01 CLASS 3 SEVERE OBESITY DUE TO EXCESS CALORIES WITH SERIOUS COMORBIDITY AND BODY MASS INDEX (BMI) OF 40.0 TO 44.9 IN ADULT: ICD-10-CM

## 2025-03-18 DIAGNOSIS — Z78.9 WEIGHT LOSS ADVISED: ICD-10-CM

## 2025-03-18 DIAGNOSIS — E66.813 CLASS 3 SEVERE OBESITY DUE TO EXCESS CALORIES WITH SERIOUS COMORBIDITY AND BODY MASS INDEX (BMI) OF 40.0 TO 44.9 IN ADULT: ICD-10-CM

## 2025-03-18 PROCEDURE — RXMED WILLOW AMBULATORY MEDICATION CHARGE

## 2025-03-18 NOTE — PROGRESS NOTES
Clinical Pharmacy Appointment    Patient ID: Arin Phan is a 59 y.o. female who presents for Obesity.    Pt is here for Follow Up appointment.     Referring Provider/PCP: Jimenez Haines DO  Last visit with PCP: 2/11/25  Next visit with PCP: 3/25/25 with Dr Godoy      Subjective     HPI  WEIGHT LOSS  BMI Readings from Last 3 Encounters:   02/11/25 38.96 kg/m²   02/18/25 38.96 kg/m²   01/15/25 38.79 kg/m²      Starting weight: 231 lbs. (12/10)  Current weight: last 226 lbs. (Clothes are looser)    Lifestyle (Lower appetite)  Diet: 2 meals/day.  BK: Eggs, turkey sausage  LN: Usually skips  DN: Chicken, eggs, burgers, corn/peas  Snacks: Ritz peanut butter crackers, chips occasionally   Drinks: Crystal Light water  Has worked with nutritionist/dietician? Yes,    Physical Activity: On feet all day (), starting to use treadmill    Other Potential Contributing Factors  Alcohol use: Average 0 drinks/week  Tobacco use: No  Other drug use: No  Medications that may cause weight gain: none  Mental health: No current concerns  Eating Disorders: Denies Hx of any eating disorder  Comorbidities: Comorbidities: sleep apnea not using an appliance  (Had one, but stopped using)      Non-Pharmacological Therapy  Weight loss techniques attempted:  Self-directed dieting: Weight watchers in past    Pharmacological Therapy  Current Medications: Zepbound 5 mg on Sundays x 3  Adverse Effects: None  Previous Medications: Ozempic- fatigue, mental fog (through a med spa)    Insurance coverage of weight-loss medications? No,    Eligible for copay cards/programs? Yes,    Eligible for  PAP? Yes, reports income <400% FPL    Medication Estimated Cost Expected weight loss Patient Risks/Cautions Notes   Wegovy (semaglutide) ~$650/month w/ savings card 10-20% None      Zepbound (tirzepatide) $650/month   w/ savings card 10-20%     Qsymia (phentermine-topiramate) $98/month via [employer] 5-10% None Controlled substance    Contrave (bupropion-naltrexone) $99/month   via [employer] 5-10% None    Adipex (phentermine) ~$15/month 3-5% None Controlled substance,  Short-term use only   Renato (OTC Orlistat) ~$60/month 3-5%  Adverse effects likely outweigh benefit.         Medication Reconciliation:  Changed:   Added:   Discontinued:     Drug Interactions  No relevant drug interactions were noted.    Medication System Management  Patients preferred pharmacy: [facility]  Adherence/Organization: No issues reported  Affordability/Accessibility: Zepbound through [facility] through 12/31/25      Objective   Allergies   Allergen Reactions    House Dust Mite Other     congested, stuffy    Mold Other     congested, stuffy     Social History     Social History Narrative    Not on file      Medication Review  Current Outpatient Medications   Medication Instructions    acetaminophen (TylenoL) 325 mg tablet Every 4 hours    albuterol (Ventolin HFA) 90 mcg/actuation inhaler Inhale.    calcium citrate-vitamin D3 500 mg-12.5 mcg (500 unit) tablet,chewable 1 tablet, 2 times daily    cyanocobalamin (Vitamin B-12) 100 mcg tablet 1 tablet, Daily    DULoxetine (Cymbalta) 30 mg DR capsule TAKE ONE CAPSULE BY MOUTH EVERY DAY for 2 weeks.  if tolerating increase to 2 capsules by mouth once a day.  do not crush or chew.    Lidocaine Pain Relief 4 % patch APPLY ONE PATCH EXTERNALLY DAILY AS DIRECTED AS NEEDED    minoxidil (LONITEN) 1.25 mg, oral, Daily    multivit-minerals/folic acid (ADULT ONE DAILY MULTIVITAMIN ORAL) Take by mouth. As directed    SUMAtriptan (IMITREX) 50 mg, oral, Once as needed, May repeat after 2 hours.    Zepbound 5 mg, subcutaneous, Every 7 days      Vitals  BP Readings from Last 2 Encounters:   02/11/25 150/90   01/15/25 140/69     BMI Readings from Last 1 Encounters:   02/11/25 38.96 kg/m²      Labs  A1C  Lab Results   Component Value Date    HGBA1C 5.4 02/17/2025    HGBA1C 5.7 (H) 10/23/2024    HGBA1C 5.7 03/28/2018     BMP  Lab Results  "  Component Value Date    CALCIUM 9.3 02/17/2025     02/17/2025    K 4.3 02/17/2025    CO2 29 02/17/2025     02/17/2025    BUN 19 02/17/2025    CREATININE 0.65 02/17/2025    EGFR 102 02/17/2025     LFTs  Lab Results   Component Value Date    ALT 20 02/17/2025    AST 24 02/17/2025    ALKPHOS 80 02/17/2025    BILITOT 0.3 02/17/2025     FLP  Lab Results   Component Value Date    TRIG 61 02/17/2025    CHOL 197 02/17/2025    LDLCALC 106 (H) 02/17/2025    HDL 76 02/17/2025     Urine Microalbumin  No results found for: \"MICROALBCREA\"  Weight Management  Wt Readings from Last 3 Encounters:   02/11/25 103 kg (227 lb)   02/18/25 103 kg (227 lb)   01/15/25 103 kg (226 lb)      There is no height or weight on file to calculate BMI.     Assessment/Plan   Problem List Items Addressed This Visit       Obesity, unspecified     Current regimen includes Zepbound. Patient's last weight reported as 226. Has lost at least 5 lbs (2% of TBW) since starting therapy plan. Due to tolerance of medication, continue dose.    Medication Changes:  CONTINUE  Zepbound 5 mg once weekly         Relevant Orders    Referral to Clinical Pharmacy     Other Visit Diagnoses       Weight loss advised        Relevant Orders    Referral to Clinical Pharmacy    Prediabetes        Relevant Orders    Referral to Clinical Pharmacy          Clinical Pharmacist follow-up: 4/18 at 8:40, Telehealth visit    Continue all meds under the continuation of care with the referring provider and clinical pharmacy team.    Thank you,  Bella Prieto, PharmD, [facility]  Clinical Pharmacy Specialist  [phone]    Verbal consent to manage patient's drug therapy was obtained from the patient. They were informed they may decline to participate or withdraw from participation in pharmacy services at any time.   "

## 2025-03-18 NOTE — ASSESSMENT & PLAN NOTE
Current regimen includes Zepbound. Patient's last weight reported as 226. Has lost at least 5 lbs (2% of TBW) since starting therapy plan. Due to tolerance of medication, continue dose.    Medication Changes:  CONTINUE  Zepbound 5 mg once weekly

## 2025-03-24 ENCOUNTER — PHARMACY VISIT (OUTPATIENT)
Dept: PHARMACY | Facility: CLINIC | Age: 60
End: 2025-03-24
Payer: COMMERCIAL

## 2025-03-25 ENCOUNTER — OFFICE VISIT (OUTPATIENT)
Dept: PRIMARY CARE | Facility: CLINIC | Age: 60
End: 2025-03-25
Payer: MEDICARE

## 2025-03-25 VITALS
WEIGHT: 225 LBS | BODY MASS INDEX: 38.41 KG/M2 | HEIGHT: 64 IN | TEMPERATURE: 97 F | HEART RATE: 87 BPM | SYSTOLIC BLOOD PRESSURE: 126 MMHG | DIASTOLIC BLOOD PRESSURE: 78 MMHG | OXYGEN SATURATION: 100 %

## 2025-03-25 DIAGNOSIS — F51.01 PRIMARY INSOMNIA: Primary | ICD-10-CM

## 2025-03-25 DIAGNOSIS — E78.00 ELEVATED LDL CHOLESTEROL LEVEL: ICD-10-CM

## 2025-03-25 DIAGNOSIS — G89.29 OTHER CHRONIC PAIN: ICD-10-CM

## 2025-03-25 DIAGNOSIS — M75.121 COMPLETE TEAR OF RIGHT ROTATOR CUFF, UNSPECIFIED WHETHER TRAUMATIC: ICD-10-CM

## 2025-03-25 DIAGNOSIS — E66.01 MORBID OBESITY (MULTI): ICD-10-CM

## 2025-03-25 PROCEDURE — 3008F BODY MASS INDEX DOCD: CPT | Performed by: STUDENT IN AN ORGANIZED HEALTH CARE EDUCATION/TRAINING PROGRAM

## 2025-03-25 PROCEDURE — 99214 OFFICE O/P EST MOD 30 MIN: CPT | Performed by: STUDENT IN AN ORGANIZED HEALTH CARE EDUCATION/TRAINING PROGRAM

## 2025-03-25 PROCEDURE — 1036F TOBACCO NON-USER: CPT | Performed by: STUDENT IN AN ORGANIZED HEALTH CARE EDUCATION/TRAINING PROGRAM

## 2025-03-25 RX ORDER — TRAZODONE HYDROCHLORIDE 50 MG/1
TABLET ORAL
Qty: 90 TABLET | Refills: 3 | Status: SHIPPED | OUTPATIENT
Start: 2025-03-25

## 2025-03-25 RX ORDER — DULOXETIN HYDROCHLORIDE 30 MG/1
CAPSULE, DELAYED RELEASE ORAL
Qty: 30 CAPSULE | Refills: 5 | Status: SHIPPED | OUTPATIENT
Start: 2025-03-25

## 2025-03-25 RX ORDER — DULOXETIN HYDROCHLORIDE 60 MG/1
60 CAPSULE, DELAYED RELEASE ORAL DAILY
Qty: 90 CAPSULE | Refills: 3 | Status: SHIPPED | OUTPATIENT
Start: 2025-03-25

## 2025-03-25 ASSESSMENT — PAIN SCALES - GENERAL: PAINLEVEL_OUTOF10: 0-NO PAIN

## 2025-03-25 NOTE — ASSESSMENT & PLAN NOTE
Chronic, has tried multiple lifestyle changes/management without relief  Trial trazodone 25-50mg nightly to see if any benefit  To reach out if does not work and will consider alternative such as atarax    Orders:    traZODone (Desyrel) 50 mg tablet; Take half to one tablet by mouth about 30 minutes before bedtime as needed for sleep

## 2025-03-25 NOTE — PROGRESS NOTES
OUTPATIENT VISIT - JOANNA Cabezas   Patient ID: Arin Phan is a 59 y.o. female who presents for New Patient Visit (Having issues with sleeping ).    HPI  HPI     April 23rd - right rotator cuff surgery    Difficulty sleeping - used to wake up between 2 and 4 with a pounding headache, this has resolved over the past 2 months. However still having issues staying asleep. Goes to bed at 10pm, wakes up at midnight and only falls asleep at 5am. Not sure if because of her shoulder and everything else happening. Sleep pattern happening for a long period of time and would like something to help. Has tried OTC and appropriate sleep hygiene without benefit.    Weight - has been on the zepbound 5 for a while, has lost about 5 lbs but seems to be at a plateau. Tolerating without side effects. Interested in increasing to next dose.    Chronic pain - left hip and now right shoulder s/p complete tear of rotator cuff. Used to be on cymbalta but was tired of reaching out for refills. It did help. Last on it about 1-2 months ago.    Objective     ROS  Review of Systems  All pertinent positive symptoms are included in the history of present illness.  All other systems have been reviewed and are negative and noncontributory to this patient's current ailments.    PHQ9/GAD7:  No data recorded   No data recorded   No data recorded     Current Medications  Current Outpatient Medications   Medication Instructions    acetaminophen (TylenoL) 325 mg tablet Every 4 hours    albuterol (Ventolin HFA) 90 mcg/actuation inhaler Inhale.    calcium citrate-vitamin D3 500 mg-12.5 mcg (500 unit) tablet,chewable 1 tablet, 2 times daily    cyanocobalamin (Vitamin B-12) 100 mcg tablet 1 tablet, Daily    DULoxetine (Cymbalta) 30 mg DR capsule Take 1 capsule by mouth daily x 2 weeks, then increase to 2 capsules (60mg) by mouth daily. Do not crush or chew.    DULoxetine (CYMBALTA) 60 mg, oral, Daily, To start after completing the 30mg capsules  (30mg x 2 weeks, 60mg daily afterwards) Do not crush or chew.    Lidocaine Pain Relief 4 % patch     minoxidil (LONITEN) 1.25 mg, oral, Daily    multivit-minerals/folic acid (ADULT ONE DAILY MULTIVITAMIN ORAL) Take by mouth. As directed    SUMAtriptan (IMITREX) 50 mg, oral, Once as needed, May repeat after 2 hours.    tirzepatide (weight loss) (ZEPBOUND) 7.5 mg, subcutaneous, Every 7 days    tirzepatide (weight loss) (ZEPBOUND) 10 mg, subcutaneous, Every 7 days, Start after completing the 7.5mg injections x 4 weeks    tirzepatide (weight loss) (ZEPBOUND) 12.5 mg, subcutaneous, Every 7 days, Start after completing the 10mg injections x 4 weeks    tirzepatide (weight loss) (ZEPBOUND) 15 mg, subcutaneous, Every 7 days, Start after completing the 12.5mg injections x 4 weeks    traZODone (Desyrel) 50 mg tablet Take half to one tablet by mouth about 30 minutes before bedtime as needed for sleep    Zepbound 5 mg, subcutaneous, Every 7 days        Allergies  Allergies   Allergen Reactions    House Dust Mite Other     congested, stuffy    Mold Other     congested, stuffy    Pollen Extracts Unknown        VITAL SIGNS  Vitals:    03/25/25 1122   BP: 126/78   Pulse: 87   Temp: 36.1 °C (97 °F)   SpO2: 100%     Vitals:    03/25/25 1122   Weight: 102 kg (225 lb)      Body mass index is 38.62 kg/m².     Physical Exam  Vitals and nursing note reviewed.   Constitutional:       General: She is not in acute distress.  HENT:      Head: Normocephalic.   Cardiovascular:      Rate and Rhythm: Normal rate and regular rhythm.   Pulmonary:      Effort: Pulmonary effort is normal. No respiratory distress.   Neurological:      General: No focal deficit present.      Mental Status: She is alert and oriented to person, place, and time.   Psychiatric:         Mood and Affect: Mood normal.         Thought Content: Thought content normal.       Assessment/Plan   Assessment & Plan  Primary insomnia    Chronic, has tried multiple lifestyle  changes/management without relief  Trial trazodone 25-50mg nightly to see if any benefit  To reach out if does not work and will consider alternative such as atarax    Orders:    traZODone (Desyrel) 50 mg tablet; Take half to one tablet by mouth about 30 minutes before bedtime as needed for sleep    Other chronic pain    Established with pain management for left hip management  Also pain s/p tear of right rotator cuff  Was finding benefit from cymbalta - will resume  Start 30mg x 2 weeks, increase to 60mg thereafter    Orders:    DULoxetine (Cymbalta) 30 mg DR capsule; Take 1 capsule by mouth daily x 2 weeks, then increase to 2 capsules (60mg) by mouth daily. Do not crush or chew.    DULoxetine (Cymbalta) 60 mg DR capsule; Take 1 capsule (60 mg) by mouth once daily. To start after completing the 30mg capsules (30mg x 2 weeks, 60mg daily afterwards) Do not crush or chew.    Morbid obesity (Multi)    Support provided for weight loss journey  Established with clinical pharamcy, on zepbound 5 and tolerating  Interested in increase in dose - dose increased per month to goal 15, to reach out if at any point desires maintaining a dose  Message sent to pharmacist Bella Prieto to ensure no issues that I prescribed  Continue to accompany with dietary/lifestyle changes as well    Orders:    tirzepatide, weight loss, (Zepbound) 7.5 mg/0.5 mL injection; Inject 7.5 mg under the skin every 7 days.    tirzepatide, weight loss, (Zepbound) 10 mg/0.5 mL injection; Inject 10 mg under the skin every 7 days. Start after completing the 7.5mg injections x 4 weeks    tirzepatide, weight loss, (Zepbound) 12.5 mg/0.5 mL injection; Inject 12.5 mg under the skin every 7 days. Start after completing the 10mg injections x 4 weeks    tirzepatide, weight loss, (Zepbound) 15 mg/0.5 mL injection; Inject 15 mg under the skin every 7 days. Start after completing the 12.5mg injections x 4 weeks    BMI 38.0-38.9,adult    Orders:    tirzepatide, weight  loss, (Zepbound) 7.5 mg/0.5 mL injection; Inject 7.5 mg under the skin every 7 days.    tirzepatide, weight loss, (Zepbound) 10 mg/0.5 mL injection; Inject 10 mg under the skin every 7 days. Start after completing the 7.5mg injections x 4 weeks    tirzepatide, weight loss, (Zepbound) 12.5 mg/0.5 mL injection; Inject 12.5 mg under the skin every 7 days. Start after completing the 10mg injections x 4 weeks    tirzepatide, weight loss, (Zepbound) 15 mg/0.5 mL injection; Inject 15 mg under the skin every 7 days. Start after completing the 12.5mg injections x 4 weeks    Elevated LDL cholesterol level    Orders:    tirzepatide, weight loss, (Zepbound) 7.5 mg/0.5 mL injection; Inject 7.5 mg under the skin every 7 days.    tirzepatide, weight loss, (Zepbound) 10 mg/0.5 mL injection; Inject 10 mg under the skin every 7 days. Start after completing the 7.5mg injections x 4 weeks    tirzepatide, weight loss, (Zepbound) 12.5 mg/0.5 mL injection; Inject 12.5 mg under the skin every 7 days. Start after completing the 10mg injections x 4 weeks    tirzepatide, weight loss, (Zepbound) 15 mg/0.5 mL injection; Inject 15 mg under the skin every 7 days. Start after completing the 12.5mg injections x 4 weeks    Complete tear of right rotator cuff, unspecified whether traumatic  Scheduled for surgery April 23  To let me know if requires pre-op clearance           Counseling:       Medication education:           Education:  The patient is counseled regarding potential side-effects of all new medications          Understanding:  Patient expressed understanding of information conveyed today          Adherence:  No barriers to adherence identified     Additional to-do items to consider next visit: review health maintenance    Follow-up: 6 months weight monitoring, next annual due Feb 2025    ** Please excuse any errors in grammar or translation related to this dictation. Voice recognition software was utilized to prepare this document. **           Dariana Godoy MD   03/25/25   7:46 PM

## 2025-03-25 NOTE — ASSESSMENT & PLAN NOTE
Orders:    tirzepatide, weight loss, (Zepbound) 7.5 mg/0.5 mL injection; Inject 7.5 mg under the skin every 7 days.    tirzepatide, weight loss, (Zepbound) 10 mg/0.5 mL injection; Inject 10 mg under the skin every 7 days. Start after completing the 7.5mg injections x 4 weeks    tirzepatide, weight loss, (Zepbound) 12.5 mg/0.5 mL injection; Inject 12.5 mg under the skin every 7 days. Start after completing the 10mg injections x 4 weeks    tirzepatide, weight loss, (Zepbound) 15 mg/0.5 mL injection; Inject 15 mg under the skin every 7 days. Start after completing the 12.5mg injections x 4 weeks

## 2025-03-25 NOTE — ASSESSMENT & PLAN NOTE
Established with pain management for left hip management  Also pain s/p tear of right rotator cuff  Was finding benefit from cymbalta - will resume  Start 30mg x 2 weeks, increase to 60mg thereafter    Orders:    DULoxetine (Cymbalta) 30 mg DR capsule; Take 1 capsule by mouth daily x 2 weeks, then increase to 2 capsules (60mg) by mouth daily. Do not crush or chew.    DULoxetine (Cymbalta) 60 mg DR capsule; Take 1 capsule (60 mg) by mouth once daily. To start after completing the 30mg capsules (30mg x 2 weeks, 60mg daily afterwards) Do not crush or chew.

## 2025-03-25 NOTE — ASSESSMENT & PLAN NOTE
Support provided for weight loss journey  Established with clinical pharamcy, on zepbound 5 and tolerating  Interested in increase in dose - dose increased per month to goal 15, to reach out if at any point desires maintaining a dose  Message sent to pharmacist Bella Prieto to ensure no issues that I prescribed  Continue to accompany with dietary/lifestyle changes as well    Orders:    tirzepatide, weight loss, (Zepbound) 7.5 mg/0.5 mL injection; Inject 7.5 mg under the skin every 7 days.    tirzepatide, weight loss, (Zepbound) 10 mg/0.5 mL injection; Inject 10 mg under the skin every 7 days. Start after completing the 7.5mg injections x 4 weeks    tirzepatide, weight loss, (Zepbound) 12.5 mg/0.5 mL injection; Inject 12.5 mg under the skin every 7 days. Start after completing the 10mg injections x 4 weeks    tirzepatide, weight loss, (Zepbound) 15 mg/0.5 mL injection; Inject 15 mg under the skin every 7 days. Start after completing the 12.5mg injections x 4 weeks

## 2025-03-26 ENCOUNTER — TELEPHONE (OUTPATIENT)
Dept: PRIMARY CARE | Facility: CLINIC | Age: 60
End: 2025-03-26

## 2025-03-26 ENCOUNTER — TELEMEDICINE (OUTPATIENT)
Dept: PHARMACY | Facility: HOSPITAL | Age: 60
End: 2025-03-26
Payer: MEDICARE

## 2025-03-26 DIAGNOSIS — R73.03 PREDIABETES: ICD-10-CM

## 2025-03-26 DIAGNOSIS — E66.813 CLASS 3 SEVERE OBESITY DUE TO EXCESS CALORIES WITH SERIOUS COMORBIDITY AND BODY MASS INDEX (BMI) OF 40.0 TO 44.9 IN ADULT: Primary | ICD-10-CM

## 2025-03-26 DIAGNOSIS — E66.01 CLASS 3 SEVERE OBESITY DUE TO EXCESS CALORIES WITH SERIOUS COMORBIDITY AND BODY MASS INDEX (BMI) OF 40.0 TO 44.9 IN ADULT: ICD-10-CM

## 2025-03-26 DIAGNOSIS — Z78.9 WEIGHT LOSS ADVISED: ICD-10-CM

## 2025-03-26 DIAGNOSIS — E66.01 MORBID OBESITY (MULTI): ICD-10-CM

## 2025-03-26 DIAGNOSIS — E78.00 ELEVATED LDL CHOLESTEROL LEVEL: ICD-10-CM

## 2025-03-26 DIAGNOSIS — E66.813 CLASS 3 SEVERE OBESITY DUE TO EXCESS CALORIES WITH SERIOUS COMORBIDITY AND BODY MASS INDEX (BMI) OF 40.0 TO 44.9 IN ADULT: ICD-10-CM

## 2025-03-26 DIAGNOSIS — E66.01 CLASS 3 SEVERE OBESITY DUE TO EXCESS CALORIES WITH SERIOUS COMORBIDITY AND BODY MASS INDEX (BMI) OF 40.0 TO 44.9 IN ADULT: Primary | ICD-10-CM

## 2025-03-26 PROCEDURE — RXMED WILLOW AMBULATORY MEDICATION CHARGE

## 2025-03-26 NOTE — ASSESSMENT & PLAN NOTE
Current regimen includes Zepbound. Patient's current weight reported as 225. Has lost at 6 lbs (2% of TBW) since starting therapy plan. Due to tolerance of medication, increase dose.    Medication Changes:  INCREASE  Zepbound 5 to 7.5 mg once weekly

## 2025-03-26 NOTE — PROGRESS NOTES
Clinical Pharmacy Appointment    Patient ID: Arin Phan is a 59 y.o. female who presents for Obesity.    Pt is here for Follow Up appointment.     Referring Provider/PCP: Dariana Godoy MD  Last visit with PCP: 3/25/25   Next visit with PCP: Not yet scheduled      Subjective     HPI  WEIGHT LOSS  BMI Readings from Last 3 Encounters:   03/25/25 38.62 kg/m²   02/11/25 38.96 kg/m²   02/18/25 38.96 kg/m²      Starting weight: 231 lbs. (12/10)  Current weight: 225 lbs.    Lifestyle (Lower appetite)  Diet: 2 meals/day.  BK: Eggs, turkey sausage  LN: Usually skips  DN: Chicken, eggs, burgers, corn/peas  Snacks: Ritz peanut butter crackers, chips occasionally   Drinks: Crystal Light water  Has worked with nutritionist/dietician? Yes,    Physical Activity: On feet all day (), starting to use treadmill    Other Potential Contributing Factors  Alcohol use: Average 0 drinks/week  Tobacco use: No  Other drug use: No  Medications that may cause weight gain: none  Mental health: No current concerns  Eating Disorders: Denies Hx of any eating disorder  Comorbidities: Comorbidities: sleep apnea not using an appliance  (Had one, but stopped using)      Non-Pharmacological Therapy  Weight loss techniques attempted:  Self-directed dieting: Weight watchers in past    Pharmacological Therapy  Current Medications: Zepbound 5 mg on Sundays x 4  Adverse Effects: None  Previous Medications: Ozempic- fatigue, mental fog (through a med spa)    Insurance coverage of weight-loss medications? No,    Eligible for copay cards/programs? Yes,    Eligible for  PAP? Yes, reports income <400% FPL    Medication Estimated Cost Expected weight loss Patient Risks/Cautions Notes   Wegovy (semaglutide) ~$650/month w/ savings card 10-20% None      Zepbound (tirzepatide) $650/month   w/ savings card 10-20%     Qsymia (phentermine-topiramate) $98/month via [employer] 5-10% None Controlled substance   Contrave (bupropion-naltrexone)  $99/month   via [employer] 5-10% None    Adipex (phentermine) ~$15/month 3-5% None Controlled substance,  Short-term use only   Renato (OTC Orlistat) ~$60/month 3-5%  Adverse effects likely outweigh benefit.         Medication Reconciliation:  Changed:   Added:   Discontinued:     Drug Interactions  No relevant drug interactions were noted.    Medication System Management  Patients preferred pharmacy: [facility]  Adherence/Organization: No issues reported  Affordability/Accessibility: [facility]epbound through [facility] through 12/31/25      Objective   Allergies   Allergen Reactions    House Dust Mite Other     congested, stuffy    Mold Other     congested, stuffy    Pollen Extracts Unknown     Social History     Social History Narrative    Not on file      Medication Review  Current Outpatient Medications   Medication Instructions    acetaminophen (TylenoL) 325 mg tablet Every 4 hours    albuterol (Ventolin HFA) 90 mcg/actuation inhaler Inhale.    calcium citrate-vitamin D3 500 mg-12.5 mcg (500 unit) tablet,chewable 1 tablet, 2 times daily    cyanocobalamin (Vitamin B-12) 100 mcg tablet 1 tablet, Daily    DULoxetine (Cymbalta) 30 mg DR capsule Take 1 capsule by mouth daily x 2 weeks, then increase to 2 capsules (60mg) by mouth daily. Do not crush or chew.    DULoxetine (CYMBALTA) 60 mg, oral, Daily, To start after completing the 30mg capsules (30mg x 2 weeks, 60mg daily afterwards) Do not crush or chew.    minoxidil (LONITEN) 1.25 mg, oral, Daily    multivit-minerals/folic acid (ADULT ONE DAILY MULTIVITAMIN ORAL) Take by mouth. As directed    tirzepatide (weight loss) (ZEPBOUND) 7.5 mg, subcutaneous, Every 7 days    traZODone (Desyrel) 50 mg tablet Take half to one tablet by mouth about 30 minutes before bedtime as needed for sleep      Vitals  BP Readings from Last 2 Encounters:   03/25/25 126/78   02/11/25 150/90     BMI Readings from Last 1 Encounters:   03/25/25 38.62 kg/m²      Labs  A1C  Lab Results   Component Value Date  "   HGBA1C 5.4 02/17/2025    HGBA1C 5.7 (H) 10/23/2024    HGBA1C 5.7 03/28/2018     BMP  Lab Results   Component Value Date    CALCIUM 9.3 02/17/2025     02/17/2025    K 4.3 02/17/2025    CO2 29 02/17/2025     02/17/2025    BUN 19 02/17/2025    CREATININE 0.65 02/17/2025    EGFR 102 02/17/2025     LFTs  Lab Results   Component Value Date    ALT 20 02/17/2025    AST 24 02/17/2025    ALKPHOS 80 02/17/2025    BILITOT 0.3 02/17/2025     FLP  Lab Results   Component Value Date    TRIG 61 02/17/2025    CHOL 197 02/17/2025    LDLCALC 106 (H) 02/17/2025    HDL 76 02/17/2025     Urine Microalbumin  No results found for: \"MICROALBCREA\"  Weight Management  Wt Readings from Last 3 Encounters:   03/25/25 102 kg (225 lb)   02/11/25 103 kg (227 lb)   02/18/25 103 kg (227 lb)      There is no height or weight on file to calculate BMI.     Assessment/Plan   Problem List Items Addressed This Visit       Morbid obesity (Multi)     Current regimen includes Zepbound. Patient's current weight reported as 225. Has lost at 6 lbs (2% of TBW) since starting therapy plan. Due to tolerance of medication, increase dose.    Medication Changes:  INCREASE  Zepbound 5 to 7.5 mg once weekly         Relevant Medications    tirzepatide, weight loss, (Zepbound) 7.5 mg/0.5 mL injection    Elevated LDL cholesterol level    Relevant Medications    tirzepatide, weight loss, (Zepbound) 7.5 mg/0.5 mL injection    BMI 38.0-38.9,adult    Relevant Medications    tirzepatide, weight loss, (Zepbound) 7.5 mg/0.5 mL injection     Other Visit Diagnoses       Weight loss advised        Prediabetes        Class 3 severe obesity due to excess calories with serious comorbidity and body mass index (BMI) of 40.0 to 44.9 in adult              Clinical Pharmacist follow-up: 4/18 at 8:40, Telehealth visit    Continue all meds under the continuation of care with the referring provider and clinical pharmacy team.    Thank you,  Bella Prieto, PharmD, " [facility]  Clinical Pharmacy Specialist  [phone]    Verbal consent to manage patient's drug therapy was obtained from the patient. They were informed they may decline to participate or withdraw from participation in pharmacy services at any time.

## 2025-03-26 NOTE — TELEPHONE ENCOUNTER
Notified by pharmacist Bella Prieto that for the weight loss medication program, a pharmacist will need to prescribe the glp-1 agonists for patients to get the cost benefit. Refills ordered yesterday canceled. Bella called pt and ordered the 7.5 zepbound dose for next fill.

## 2025-03-27 ENCOUNTER — PHARMACY VISIT (OUTPATIENT)
Dept: PHARMACY | Facility: CLINIC | Age: 60
End: 2025-03-27
Payer: COMMERCIAL

## 2025-03-31 ENCOUNTER — CLINICAL SUPPORT (OUTPATIENT)
Dept: PREADMISSION TESTING | Facility: HOSPITAL | Age: 60
End: 2025-03-31
Payer: MEDICARE

## 2025-03-31 ASSESSMENT — ENCOUNTER SYMPTOMS
NAUSEA: 0
CONFUSION: 0
RHINORRHEA: 0
FEVER: 0
ABDOMINAL PAIN: 0
COUGH: 0
VOMITING: 0
DIARRHEA: 0
SINUS CONGESTION: 0
EYE DISCHARGE: 0
AGITATION: 0
CHILLS: 0
WOUND: 0

## 2025-03-31 NOTE — CPM/PAT NURSE NOTE
CPM/PAT Nurse Note      Name: Arin Joseph (Arin Joseph)  /Age: 1965/59 y.o.       Past Medical History:   Diagnosis Date    Allergic     Anxiety     Arthritis     Asthma     Cluster headache ?    Depression     Joint pain ?    Low back pain ?    Migraine ?    Neck pain ?    Obesity     Osteoarthritis ?    Sleep apnea     no longer uses CPAP after bariatric surgery    Spinal stenosis ?       Past Surgical History:   Procedure Laterality Date    BARIATRIC SURGERY      BUNIONECTOMY      CHOLECYSTECTOMY      EPIDURAL BLOCK INJECTION  ?    KNEE CARTILAGE SURGERY Right        Patient  reports never being sexually active.    Family History   Problem Relation Name Age of Onset    Heart disease Mother 81     Coronary artery disease Mother 81     Other (s/p cabg) Mother 81     Hypertension Father 81     Obesity Father 81     Atrial fibrillation Father 81     Heart failure Father 81     Other (CRF) Father 81     Arthritis Father 81     Alcohol abuse Father 81     Other (CVA) Brother 59     Stroke Brother 59     Breast cancer Maternal Grandmother Lucihever joseph     Migraines Maternal Grandmother Luci joseph     Breast cancer Paternal Grandmother         Allergies   Allergen Reactions    House Dust Mite Other     congested, stuffy    Mold Other     congested, stuffy    Pollen Extracts Unknown       Prior to Admission medications    Medication Sig Start Date End Date Taking? Authorizing Provider   acetaminophen (TylenoL) 325 mg tablet Take 2 tablets (650 mg) by mouth every 8 hours if needed for moderate pain (4 - 6) or mild pain (1 - 3).   Yes Historical Provider, MD   albuterol (Ventolin HFA) 90 mcg/actuation inhaler Inhale 2 puffs every 6 hours if needed for shortness of breath or wheezing. 13  Yes Historical Provider, MD   ASPIRIN-ACETAMINOPHEN-CAFFEINE ORAL Take 2 tablets by mouth 2 times a day as needed for headaches.   Yes Historical Provider, MD   calcium citrate-vitamin D3 500 mg-12.5 mcg (500  unit) tablet,chewable Chew 1 tablet 2 times a day.  Patient taking differently: Chew 1 tablet once daily in the morning.   Yes Historical Provider, MD   cyanocobalamin (Vitamin B-12) 100 mcg tablet Take 1 tablet (100 mcg) by mouth once daily in the morning.   Yes Historical Provider, MD   DULoxetine (Cymbalta) 60 mg DR capsule Take 1 capsule (60 mg) by mouth once daily. To start after completing the 30mg capsules (30mg x 2 weeks, 60mg daily afterwards) Do not crush or chew.  Patient taking differently: Take 1 capsule (60 mg) by mouth once daily in the morning. To start after completing the 30mg capsules (30mg x 2 weeks, 60mg daily afterwards) Do not crush or chew. 3/25/25  Yes Dariana Godoy MD   minoxidil (Loniten) 2.5 mg tablet Take 0.5 tablets (1.25 mg) by mouth once daily.  Patient taking differently: Take 0.5 tablets (1.25 mg) by mouth once daily in the morning. 2/12/25 2/12/26 Yes AV Lee   multivit-minerals/folic acid (ADULT ONE DAILY MULTIVITAMIN ORAL) Take by mouth once daily in the morning. As directed   Yes Historical Provider, MD   tirzepatide, weight loss, (Zepbound) 7.5 mg/0.5 mL injection Inject 7.5 mg under the skin every 7 days.  Patient taking differently: Inject 7.5 mg under the skin every 7 days. Every Sunday morning 3/26/25  Yes Dariana Godoy MD   traZODone (Desyrel) 50 mg tablet Take half to one tablet by mouth about 30 minutes before bedtime as needed for sleep 3/25/25  Yes Dariana Godoy MD   DULoxetine (Cymbalta) 30 mg DR capsule Take 1 capsule by mouth daily x 2 weeks, then increase to 2 capsules (60mg) by mouth daily. Do not crush or chew.  Patient not taking: Reported on 3/31/2025 3/25/25   Dariana Godoy MD   DULoxetine (Cymbalta) 30 mg DR capsule TAKE ONE CAPSULE BY MOUTH EVERY DAY for 2 weeks.  if tolerating increase to 2 capsules by mouth once a day.  do not crush or chew. 1/28/25 3/25/25  Ish Shelton PA-C   Lidocaine Pain Relief 4 % patch   3/25/25   Historical Provider, MD   SUMAtriptan (Imitrex) 50 mg tablet Take 1 tablet (50 mg) by mouth 1 time if needed for migraine. May repeat after 2 hours. 11/12/24 3/25/25  Jimenez Haines DO   tirzepatide, weight loss, (Zepbound) 10 mg/0.5 mL injection Inject 10 mg under the skin every 7 days. Start after completing the 7.5mg injections x 4 weeks 3/25/25 3/26/25  MD derrick Huerta, weight loss, (Zepbound) 12.5 mg/0.5 mL injection Inject 12.5 mg under the skin every 7 days. Start after completing the 10mg injections x 4 weeks 3/25/25 3/26/25  MD derrick Huerta, weight loss, (Zepbound) 15 mg/0.5 mL injection Inject 15 mg under the skin every 7 days. Start after completing the 12.5mg injections x 4 weeks 3/25/25 3/26/25  MD derrick Huerta, weight loss, (Zepbound) 5 mg/0.5 mL injection Inject 5 mg under the skin every 7 days. 2/18/25 3/26/25  DO edmundo Duongzepatide, weight loss, (Zepbound) 7.5 mg/0.5 mL injection Inject 7.5 mg under the skin every 7 days. 3/25/25 3/26/25  Dariana Godoy MD        PAT ROS:   Constitutional:    Pt reports waking up with headache this morning   no fever   no chills  Neuro/Psych:    no agitation   no confusion  Eyes:    no discharge  Ears:    no ear discharge  Nose:    no nasal discharge   no sinus congestion   no epistaxis  Mouth:   Throat:    no throat pain  Neck:   Cardio:    no chest pain  Respiratory:    no cough  Endocrine:   GI:    no abdominal pain   no diarrhea   no nausea   no vomiting  :   Musculoskeletal:   Hematologic:    no history of blood transfusion   no blood clots  Skin:   no sores/wound   no rash      DASI Risk Score    No data to display       Caprini DVT Assessment    No data to display       Modified Frailty Index    No data to display       [id number] Stroke Risk Points  Current as of just now        N/A 0 to 9 Points:      Last Change: N/A          The [id number] risk score (Lip GH, et al. 2009. © 2010  American College [employer]) quantifies the risk of stroke for a patient with atrial fibrillation. For patients without atrial fibrillation or under the age of 18 this score appears as N/A. Higher score values generally indicate higher risk of stroke.        This score is not applicable to this patient. Components are not calculated.          Revised Cardiac Risk Index    No data to display       Apfel Simplified Score    No data to display       Risk Analysis Index Results This Encounter    No data found in the last 10 encounters.       Stop Bang Score      Flowsheet Row Clinical Support from 3/31/2025 [facility]   Do you snore loudly? 0 filed at 03/31/2025 0914   Do you often feel tired or fatigued after your sleep? 1 filed at 03/31/2025 0914   Has anyone ever observed you stop breathing in your sleep? 1 filed at 03/31/2025 0914   Do you have or are you being treated for high blood pressure? 0 filed at 03/31/2025 0914   Recent BMI (Calculated) 38.6 filed at 03/31/2025 0914   Is BMI greater than 35 kg/m2? 1=Yes filed at 03/31/2025 0914   Age older than 50 years old? 1=Yes filed at 03/31/2025 0914   Gender - Male 0=No filed at 03/31/2025 0914          Prodigy: High Risk  Total Score: 5              Prodigy SDB Score          ARISCAT Score for Postoperative Pulmonary Complications    No data to display       [place] Perioperative Risk for Myocardial Infarction or Cardiac Arrest (JOLANTA)    No data to display         Nurse Plan of Action:

## 2025-04-08 ENCOUNTER — PRE-ADMISSION TESTING (OUTPATIENT)
Dept: PREADMISSION TESTING | Facility: HOSPITAL | Age: 60
End: 2025-04-08
Payer: MEDICARE

## 2025-04-08 VITALS
TEMPERATURE: 97 F | SYSTOLIC BLOOD PRESSURE: 147 MMHG | HEIGHT: 64 IN | RESPIRATION RATE: 14 BRPM | HEART RATE: 74 BPM | BODY MASS INDEX: 37.6 KG/M2 | WEIGHT: 220.24 LBS | DIASTOLIC BLOOD PRESSURE: 81 MMHG | OXYGEN SATURATION: 100 %

## 2025-04-08 DIAGNOSIS — M75.121 COMPLETE TEAR OF RIGHT ROTATOR CUFF, UNSPECIFIED WHETHER TRAUMATIC: ICD-10-CM

## 2025-04-08 DIAGNOSIS — R94.31 ABNORMAL EKG: ICD-10-CM

## 2025-04-08 DIAGNOSIS — Z01.818 PRE-OP EVALUATION: Primary | ICD-10-CM

## 2025-04-08 DIAGNOSIS — Z82.49 FAMILY HISTORY OF HEART DISEASE: ICD-10-CM

## 2025-04-08 PROCEDURE — 99204 OFFICE O/P NEW MOD 45 MIN: CPT | Performed by: NURSE PRACTITIONER

## 2025-04-08 PROCEDURE — 93005 ELECTROCARDIOGRAM TRACING: CPT

## 2025-04-08 PROCEDURE — 93010 ELECTROCARDIOGRAM REPORT: CPT | Performed by: INTERNAL MEDICINE

## 2025-04-08 ASSESSMENT — DUKE ACTIVITY SCORE INDEX (DASI)
TOTAL_SCORE: 32.2
CAN YOU PARTICIPATE IN MODERATE RECREATIONAL ACTIVITIES LIKE GOLF, BOWLING, DANCING, DOUBLES TENNIS OR THROWING A BASEBALL OR FOOTBALL: NO
CAN YOU PARTICIPATE IN STRENOUS SPORTS LIKE SWIMMING, SINGLES TENNIS, FOOTBALL, BASKETBALL, OR SKIING: NO
CAN YOU WALK A BLOCK OR TWO ON LEVEL GROUND: YES
CAN YOU DO HEAVY WORK AROUND THE HOUSE LIKE SCRUBBING FLOORS OR LIFTING AND MOVING HEAVY FURNITURE: NO
CAN YOU DO MODERATE WORK AROUND THE HOUSE LIKE VACUUMING, SWEEPING FLOORS OR CARRYING GROCERIES: YES
CAN YOU WALK INDOORS, SUCH AS AROUND YOUR HOUSE: YES
CAN YOU DO LIGHT WORK AROUND THE HOUSE LIKE DUSTING OR WASHING DISHES: YES
CAN YOU DO YARD WORK LIKE RAKING LEAVES, WEEDING OR PUSHING A MOWER: NO
CAN YOU HAVE SEXUAL RELATIONS: YES
CAN YOU TAKE CARE OF YOURSELF (EAT, DRESS, BATHE, OR USE TOILET): YES
CAN YOU CLIMB A FLIGHT OF STAIRS OR WALK UP A HILL: YES
CAN YOU RUN A SHORT DISTANCE: YES
DASI METS SCORE: 6.7

## 2025-04-08 ASSESSMENT — ENCOUNTER SYMPTOMS
CARDIOVASCULAR NEGATIVE: 1
GASTROINTESTINAL NEGATIVE: 1
ENDOCRINE NEGATIVE: 1
NEUROLOGICAL NEGATIVE: 1
CONSTITUTIONAL NEGATIVE: 1
ARTHRALGIAS: 1
RESPIRATORY NEGATIVE: 1
EYES NEGATIVE: 1
NECK NEGATIVE: 1

## 2025-04-08 ASSESSMENT — PAIN - FUNCTIONAL ASSESSMENT: PAIN_FUNCTIONAL_ASSESSMENT: 0-10

## 2025-04-08 ASSESSMENT — LIFESTYLE VARIABLES: SMOKING_STATUS: NONSMOKER

## 2025-04-08 ASSESSMENT — PAIN SCALES - GENERAL: PAINLEVEL_OUTOF10: 0 - NO PAIN

## 2025-04-08 NOTE — CPM/PAT H&P
CPM/PAT Evaluation       Name: Arin Joseph (Arin Joseph)  /Age: 1965/59 y.o.     Visit Type:   In-Person       Chief Complaint: complete tear of R rotator cuff    HPI 60 yo female who is referred to PAT for evaluation by [facility] in advance of her R shoulder arthroscopy with rotator cuff repair on 2025. She has failed conservative therapy.She has had multiple injections with only short term relief.     MRI showed full-thickness tear of the rotator cuff with some tearing of the labrum, moderate to severe AC joint arthritis and biceps tendinosis     See PMH below      Past Medical History:   Diagnosis Date    Allergic     Anxiety     Arthritis     Asthma     Cluster headache ?    Depression     Joint pain ?    Low back pain ?    Migraine ?    Neck pain ?    Obesity     Osteoarthritis ?    Sleep apnea     no longer uses CPAP after bariatric surgery    Spinal stenosis ?       Past Surgical History:   Procedure Laterality Date    BARIATRIC SURGERY      BUNIONECTOMY      CHOLECYSTECTOMY      COLONOSCOPY      EPIDURAL BLOCK INJECTION  ?    KNEE CARTILAGE SURGERY Right     UPPER GASTROINTESTINAL ENDOSCOPY         Patient  reports never being sexually active.    Family History   Problem Relation Name Age of Onset    Heart disease Mother 81     Coronary artery disease Mother 81     Other (s/p cabg) Mother 81     Hypertension Father 81     Obesity Father 81     Atrial fibrillation Father 81     Heart failure Father 81     Other (CRF) Father 81     Arthritis Father 81     Alcohol abuse Father 81     Other (CVA) Brother 59     Stroke Brother 59     Breast cancer Maternal Grandmother Luci joseph     Migraines Maternal Grandmother Luci jake     Breast cancer Paternal Grandmother         Allergies   Allergen Reactions    House Dust Mite Other     congested, stuffy    Mold Other     congested, stuffy    Pollen Extracts Unknown       Medication Documentation Review Audit       Reviewed by Audra Márquez RN  (Registered Nurse) on 04/08/25 at 1030      Medication Order Taking? Sig Documenting Provider Last Dose Status   acetaminophen (TylenoL) 325 mg tablet [id number] Yes Take 2 tablets (650 mg) by mouth every 8 hours if needed for moderate pain (4 - 6) or mild pain (1 - 3). Kamlesh Provider, MD Past Week Active   albuterol (Ventolin HFA) 90 mcg/actuation inhaler [id number] Yes Inhale 2 puffs every 6 hours if needed for shortness of breath or wheezing. Kamlesh Provider, MD Past Week Active   ASPIRIN-ACETAMINOPHEN-CAFFEINE ORAL [id number] Yes Take 2 tablets by mouth 2 times a day as needed for headaches. Kamlesh Provider, MD 4/8/2025 Morning Active   calcium citrate-vitamin D3 500 mg-12.5 mcg (500 unit) tablet,chewable [id number] Yes Chew 1 tablet 2 times a day.   Patient taking differently: Chew 1 tablet once daily in the morning.    Kamlesh Provider, MD 4/8/2025 Active   cyanocobalamin (Vitamin B-12) 100 mcg tablet [id number] Yes Take 1 tablet (100 mcg) by mouth once daily in the morning. Kamlesh Provider, MD 4/8/2025 Active   DULoxetine (Cymbalta) 30 mg DR capsule [id number] Yes Take 1 capsule by mouth daily x 2 weeks, then increase to 2 capsules (60mg) by mouth daily. Do not crush or chew. Dariana Godoy MD 4/8/2025 Active   DULoxetine (Cymbalta) 60 mg DR capsule [id number] Yes Take 1 capsule (60 mg) by mouth once daily. To start after completing the 30mg capsules (30mg x 2 weeks, 60mg daily afterwards) Do not crush or chew.   Patient taking differently: Take 1 capsule (60 mg) by mouth once daily in the morning. To start after completing the 30mg capsules (30mg x 2 weeks, 60mg daily afterwards) Do not crush or chew.    Dariana Godoy MD 4/8/2025 Active   minoxidil (Loniten) 2.5 mg tablet [id number] Yes Take 0.5 tablets (1.25 mg) by mouth once daily.   Patient taking differently: Take 0.5 tablets (1.25 mg) by mouth once daily in the morning.    Danika German, APRN-CNP 4/8/2025 Active    multivit-minerals/folic acid (ADULT ONE DAILY MULTIVITAMIN ORAL) [id number] Yes Take by mouth once daily in the morning. As directed Historical Provider, MD 4/8/2025 Active   tirzepatide, weight loss, (Zepbound) 7.5 mg/0.5 mL injection [id number] Yes Inject 7.5 mg under the skin every 7 days.   Patient taking differently: Inject 7.5 mg under the skin every 7 days. Every Sunday morning    Dariana Godoy MD Past Week Active   traZODone (Desyrel) 50 mg tablet [id number] Yes Take half to one tablet by mouth about 30 minutes before bedtime as needed for sleep Dariana Godoy MD 4/7/2025 Active                         PAT ROS:   Constitutional:   neg    Neuro/Psych:   neg    Eyes:   neg    Ears:   neg    Nose:   neg    Mouth:   neg    Throat:   neg    Neck:   neg    Cardio:   neg    Respiratory:   neg    Endocrine:   neg    GI:   neg    :   neg    Musculoskeletal:    See HPI   arthralgias  Hematologic:   neg    Skin:  neg        Physical Exam  Constitutional:       Appearance: She is obese.   HENT:      Head: Normocephalic.      Nose: Nose normal.      Mouth/Throat:      Mouth: Mucous membranes are moist.      Pharynx: Oropharynx is clear.   Eyes:      Extraocular Movements: Extraocular movements intact.      Conjunctiva/sclera: Conjunctivae normal.      Pupils: Pupils are equal, round, and reactive to light.   Cardiovascular:      Rate and Rhythm: Normal rate and regular rhythm.      Pulses: Normal pulses.      Heart sounds: Normal heart sounds.   Pulmonary:      Effort: Pulmonary effort is normal.      Breath sounds: Normal breath sounds.   Abdominal:      General: Bowel sounds are normal.      Palpations: Abdomen is soft.   Musculoskeletal:         General: Tenderness (R shoulder and dec ROM) present.      Cervical back: Normal range of motion and neck supple.   Skin:     General: Skin is warm and dry.      Capillary Refill: Capillary refill takes 2 to 3 seconds.   Neurological:      General: No focal deficit  "present.      Mental Status: She is alert and oriented to person, place, and time.   Psychiatric:         Mood and Affect: Mood normal.         Behavior: Behavior normal.          PAT AIRWAY:   Airway:     Mallampati::  II    TM distance::  >3 FB    Neck ROM::  Full      Visit Vitals  /81   Pulse 74   Temp 36.1 °C (97 °F) (Temporal)   Resp 14   Ht 1.626 m (5' 4\")   Wt 99.9 kg (220 lb 3.8 oz)   SpO2 100%   BMI 37.80 kg/m²   OB Status Postmenopausal   Smoking Status Never   BSA 2.12 m²       DASI Risk Score      Flowsheet Row Pre-Admission Testing from 4/8/2025 in [facility]   Can you take care of yourself (eat, dress, bathe, or use toilet)?  2.75 filed at 04/08/2025 1108   Can you walk indoors, such as around your house? 1.75 filed at 04/08/2025 1108   Can you walk a block or two on level ground?  2.75 filed at 04/08/2025 1108   Can you climb a flight of stairs or walk up a hill? 5.5 filed at 04/08/2025 1108   Can you run a short distance? 8 filed at 04/08/2025 1108   Can you do light work around the house like dusting or washing dishes? 2.7 filed at 04/08/2025 1108   Can you do moderate work around the house like vacuuming, sweeping floors or carrying groceries? 3.5 filed at 04/08/2025 1108   Can you do heavy work around the house like scrubbing floors or lifting and moving heavy furniture?  0 filed at 04/08/2025 1108   Can you do yard work like raking leaves, weeding or pushing a mower? 0 filed at 04/08/2025 1108   Can you have sexual relations? 5.25 filed at 04/08/2025 1108   Can you participate in moderate recreational activities like golf, bowling, dancing, doubles tennis or throwing a baseball or football? 0 filed at 04/08/2025 1108   Can you participate in strenous sports like swimming, singles tennis, football, basketball, or skiing? 0 filed at 04/08/2025 1108   DASI SCORE 32.2 filed at 04/08/2025 1108   METS Score (Will be calculated only when all the questions are answered) 6.7 filed " at 04/08/2025 1108          Caprini DVT Assessment      Flowsheet Row Pre-Admission Testing from 4/8/2025 in [facility]   DVT Score (IF A SCORE IS NOT CALCULATING, MUST SELECT A BMI TO COMPLETE) 7 filed at 04/08/2025 1158   Surgical Factors Major surgery planned, lasting 2-3 hours filed at 04/08/2025 1158   BMI (BMI MUST BE CHOSEN) 31-40 (Obesity) filed at 04/08/2025 1158          Modified Frailty Index    No data to display       [id number] Stroke Risk Points  Current as of 31 minutes ago        N/A 0 to 9 Points:      Last Change: N/A          The AHL1LQ9-SDOg risk score (Lip BRITTANY, et al. 2009. © 2010 American College of Chest [employer]icians) quantifies the risk of stroke for a patient with atrial fibrillation. For patients without atrial fibrillation or under the age of 18 this score appears as N/A. Higher score values generally indicate higher risk of stroke.        This score is not applicable to this patient. Components are not calculated.          Revised Cardiac Risk Index      Flowsheet Row Pre-Admission Testing from 4/8/2025 in [facility]   High-Risk Surgery (Intraperitoneal, Intrathoracic,Suprainguinal vascular) 0 filed at 04/08/2025 1200   History of ischemic heart disease (History of MI, History of positive exercuse test, Current chest paint considered due to myocardial ischemia, Use of nitrate therapy, ECG with pathological Q Waves) 1 filed at 04/08/2025 1200   History of congestive heart failure (pulmonary edemia, bilateral rales or S3 gallop, Paroxysmal nocturnal dyspnea, CXR showing pulmonary vascular redistribution) 0 filed at 04/08/2025 1200   History of cerebrovascular disease (Prior TIA or stroke) 0 filed at 04/08/2025 1200   Pre-operative insulin treatment 0 filed at 04/08/2025 1200   Pre-operative creatinine>2 mg/dl 0 filed at 04/08/2025 1200   Revised Cardiac Risk Calculator 1 filed at 04/08/2025 1200          Apfel Simplified Score      Flowsheet Row  Pre-Admission Testing from 4/8/2025 in [facility]   Smoking status 1 filed at 04/08/2025 1109   History of motion sickness or PONV  0 filed at 04/08/2025 1109   Use of postoperative opioids 0 filed at 04/08/2025 1109   Gender - Female 1=Yes filed at 04/08/2025 1109   Apfel Simplified Score Calculator 2 filed at 04/08/2025 1109          Risk Analysis Index Results This Encounter    No data found in the last 10 encounters.       Stop Bang Score      Flowsheet Row Pre-Admission Testing from 4/8/2025 in [facility] Clinical Support from 3/31/2025 in [facility]   Do you snore loudly? 0 filed at 04/08/2025 1033 0 filed at 03/31/2025 0914   Do you often feel tired or fatigued after your sleep? 1 filed at 04/08/2025 1033 1 filed at 03/31/2025 0914   Has anyone ever observed you stop breathing in your sleep? 1 filed at 04/08/2025 1033 1 filed at 03/31/2025 0914   Do you have or are you being treated for high blood pressure? 0 filed at 04/08/2025 1033 0 filed at 03/31/2025 0914   Recent BMI (Calculated) 37.8 filed at 04/08/2025 1033 38.6 filed at 03/31/2025 0914   Is BMI greater than 35 kg/m2? 1=Yes filed at 04/08/2025 1033 1=Yes filed at 03/31/2025 0914   Age older than 50 years old? 1=Yes filed at 04/08/2025 1033 1=Yes filed at 03/31/2025 0914   Is your neck circumference greater than 17 inches (Male) or 16 inches (Female)? 1 filed at 04/08/2025 1033 --   Gender - Male 0=No filed at 04/08/2025 1033 0=No filed at 03/31/2025 0914   STOP-BANG Total Score 5 filed at 04/08/2025 1033 --          Prodigy: High Risk  Total Score: 5              Prodigy SDB Score          ARISCAT Score for Postoperative Pulmonary Complications      Flowsheet Row Pre-Admission Testing from 4/8/2025 in [facility]   Age Calculated Score 3 filed at 04/08/2025 1201   Preoperative SpO2 0 filed at 04/08/2025 1201   Respiratory infection in the last month Either upper or lower (i.e., URI,  bronchitis, pneumonia), with fever and antibiotic treatment 0 filed at 04/08/2025 1201   Preoperative anemia (Hgb less than 10 g/dl) 0 filed at 04/08/2025 1201   Surgical incision  0 filed at 04/08/2025 1201   Duration of surgery  16 filed at 04/08/2025 1201   Emergency Procedure  0 filed at 04/08/2025 1201   ARISCAT Total Score  19 filed at 04/08/2025 1201          Tim Perioperative Risk for Myocardial Infarction or Cardiac Arrest (JOLANTA)      Flowsheet Row Pre-Admission Testing from 4/8/2025 in [facility]   Calculated Age Score 1.16 filed at 04/08/2025 1201   Functional Status  0 filed at 04/08/2025 1201   ASA Class  -5.17 filed at 04/08/2025 1201   Creatinine 0 filed at 04/08/2025 1201   Type of Procedure  0.80 filed at 04/08/2025 1201   JOLANTA Total Score  -8.46 filed at 04/08/2025 1201   JOLANTA % 0.02 filed at 04/08/2025 1201          Assessment and Plan   60 yo female. presents to [facility] for risk assessment and preoperative optimization in advance of her shoulder surgery    Today her BP is slightly elevated, she is afebrile, she has a good pulse ox on room air at rest    Neuro:  No diagnosis or significant findings on chart review or clinical presentation and evaluation.   H/O migraines    HEENT/Airway:  No diagnosis or significant findings on chart review or clinical presentation and evaluation.     Cardiovascular:  Denies chest pain, shortness of breathe, dizziness, palpations, or lightheadedness    METS are  6.7, RCRI is 1 (6.0% risk for 30 day postoperative MACE)  JOLANTA indicates a 0.02% risk of intraoperative or 30 day postoperative JOLANTA  No additional preoperative testing is currently indicated.    EKG - 4/8/25 preliminary NSR, cannot rule out inf. Or ant. Infarct age undetermined    Pulmonary:  Primary insomnia- managed per her PCP  Cont trazodone as prescribed     H/O GAYE does not use CPAP    PRODIGY: Low risk for opioid induced respiratory depression  Discussed smoking cessation and deep  breathing handout given    Renal:   No diagnosis or significant findings on chart review, or clinical presentation and evaluation.     Pt at Low risk for perioperative LILIAN based on Dynamic Predictive Scoring Tool for Perioperative LILIAN  Lab Results   Component Value Date    GLUCOSE 82 02/17/2025    CALCIUM 9.3 02/17/2025     02/17/2025    K 4.3 02/17/2025    CO2 29 02/17/2025     02/17/2025    BUN 19 02/17/2025    CREATININE 0.65 02/17/2025       Endocrine:  Obesity- she is on a GLP1  Lab Results   Component Value Date    HGBA1C 5.4 02/17/2025       Hematologic:  No diagnosis or significant findings on chart review or clinical presentation and evaluation.  Lab Results   Component Value Date    WBC 4.9 02/17/2025    HGB 12.8 02/17/2025    HCT 39.9 02/17/2025    MCV 95.5 02/17/2025     02/17/2025       CAPRINI SCORE 7  Pt supplied education/VTE handout    Gastrointestinal:   No diagnosis or significant findings on chart review or clinical presentation and evaluation.   EAT-10 score of 0 - self-perceived oropharyngeal dysphagia scale (0-40)      :  No diagnosis or significant findings on chart review or clinical presentation and evaluation.     Infectious disease:   No diagnosis or significant findings on chart review or clinical presentation and evaluation.     Musculoskeletal:   See HPI  Last dose of NSAIDS  7 days preop  Instructed if  no issues with your liver you may take Tylenol 2-500 mg tablets every 8 hrs around the clock for pain including morning of surgery with a sip of water    Preoperative risk assessment  ASA I  NSQIP - Predicted length of stay days 0-1  PAT Testing - EKG    Anesthesia:  No anesthesia complications    denies dental issues  Smoker- denies  Drugs-denies  ETOH-denies  denies personal/family issues with Anesthesia    I am referring her to Cardiology due to abn EKG, strong family h/o of heart disease    Face to Face patient contact time 45 mon    IRISH Solano-CNP  4/8/2025 12:05 PM

## 2025-04-08 NOTE — PROGRESS NOTES
Primary Care Physician: Dariana Godoy MD   Date of Visit: 04/15/2025  9:30 AM EDT  Type of Visit: New      Chief Complaint:  Chief Complaint   Patient presents with    New Patient Visit     Abnormal EKG at PAT        [facility]  Arni Phan 59 y.o. female with a PMH of arthritis, asthma, obesity s/p bariatric surgery presents today for pre-operative clearance prior to R shoulder arthroscopy with rotator cuff repair on 4/23/2025.  EKG in the past has shown NSR q wave in lead III.     Family history of dad - afib, CHF; mom - CABG.     She denies symptoms of chest pain, palpitations, syncope. She does have some exertional SOB that she has contributed to asthma. She is able to perform all ADLs, including ambulating stairs, without chest pain. She has tolerated anesthesia in the past without issue.       Review of Systems   Review of Systems   Respiratory:  Positive for shortness of breath.    Musculoskeletal:  Positive for arthralgias and myalgias.      12 points review of systems are negative expect for the above    Social History:  Social History     Socioeconomic History    Marital status: Single     Spouse name: Not on file    Number of children: Not on file    Years of education: Not on file    Highest education level: Not on file   Occupational History    Not on file   Tobacco Use    Smoking status: Never     Passive exposure: Never    Smokeless tobacco: Never   Vaping Use    Vaping status: Never Used   Substance and Sexual Activity    Alcohol use: Not Currently     Comment: rare    Drug use: Never    Sexual activity: Never   Other Topics Concern    Not on file   Social History Narrative    Not on file     Social Drivers of Health     Financial Resource Strain: Not on file   Food Insecurity: Not on file   Transportation Needs: Not on file   Physical Activity: Not on file   Stress: Not on file   Social Connections: Not on file   Intimate Partner Violence: Not on file   Housing Stability: Not on file        Past  Medical History:  Past Medical History:   Diagnosis Date    Allergic     Anxiety     Arthritis     Asthma     Cluster headache ?    Depression     Joint pain ?    Low back pain ?    Migraine ?    Neck pain ?    Obesity     Osteoarthritis ?    Sleep apnea     no longer uses CPAP after bariatric surgery    Spinal stenosis ?       Past Surgical History:  Past Surgical History:   Procedure Laterality Date    BARIATRIC SURGERY      BUNIONECTOMY      CHOLECYSTECTOMY      COLONOSCOPY      EPIDURAL BLOCK INJECTION  ?    KNEE CARTILAGE SURGERY Right     UPPER GASTROINTESTINAL ENDOSCOPY         Family History:  Family History   Problem Relation Name Age of Onset    Heart disease Mother 81     Coronary artery disease Mother 81     Other (s/p cabg) Mother 81     Hypertension Father 81     Obesity Father 81     Atrial fibrillation Father 81     Heart failure Father 81     Other (CRF) Father 81     Arthritis Father 81     Alcohol abuse Father 81     Other (CVA) Brother 59     Stroke Brother 59     Breast cancer Maternal Grandmother Luci joseph     Migraines Maternal Grandmother Luci joseph     Breast cancer Paternal Grandmother          Objective:   Physical Exam  Vitals reviewed.   Constitutional:       Appearance: Normal appearance.   HENT:      Head: Normocephalic and atraumatic.   Neck:      Vascular: No carotid bruit or JVD.   Cardiovascular:      Rate and Rhythm: Normal rate and regular rhythm.      Pulses: Normal pulses.      Heart sounds: Normal heart sounds.   Pulmonary:      Effort: Pulmonary effort is normal.      Breath sounds: Normal breath sounds.   Chest:      Chest wall: No tenderness.   Abdominal:      General: Abdomen is flat. Bowel sounds are normal.      Palpations: Abdomen is soft.   Skin:     General: Skin is warm and dry.   Neurological:      General: No focal deficit present.      Mental Status: She is alert and oriented to person, place, and time. Mental status is at baseline.   Psychiatric:          "Mood and Affect: Mood normal.         Behavior: Behavior normal.             1/7/2025     2:50 PM 1/15/2025    10:44 AM 2/11/2025     1:00 PM 2/18/2025    12:26 PM 3/25/2025    11:22 AM 4/8/2025    10:31 AM 4/15/2025     9:12 AM   Vitals   Systolic 125 140 150  126 147 101   Diastolic 81 69 90  78 81 70   BP Location  Left arm        Heart Rate 73 78 86  87 74 88   Temp  36.6 °C (97.8 °F) 36.7 °C (98 °F)  36.1 °C (97 °F) 36.1 °C (97 °F)    Resp 18 18 16   14    Height 1.626 m (5' 4\") 1.626 m (5' 4\")  1.626 m (5' 4\") 1.626 m (5' 4\") 1.626 m (5' 4\")    Weight (lb) 233 226 227 227 225 220.24 221.78   BMI 39.99 kg/m2 38.79 kg/m2 38.96 kg/m2 38.96 kg/m2 38.62 kg/m2 37.8 kg/m2 38.07 kg/m2   BSA (m2) 2.19 m2 2.16 m2 2.16 m2 2.16 m2 2.15 m2 2.12 m2 2.14 m2   Visit Report Report Report Report  Report  Report        Allergies:  Allergies   Allergen Reactions    House Dust Mite Other     congested, stuffy    Mold Other     congested, stuffy    Pollen Extracts Unknown       Medications:  Current Outpatient Medications   Medication Instructions    acetaminophen (TYLENOL) 650 mg, Every 8 hours PRN    albuterol (Ventolin HFA) 90 mcg/actuation inhaler 2 puffs, Every 6 hours PRN    ASPIRIN-ACETAMINOPHEN-CAFFEINE ORAL 2 tablets, 2 times daily PRN    calcium citrate-vitamin D3 500 mg-12.5 mcg (500 unit) tablet,chewable 1 tablet, 2 times daily    cyanocobalamin (Vitamin B-12) 100 mcg tablet 1 tablet, Every morning    DULoxetine (Cymbalta) 30 mg DR capsule Take 1 capsule by mouth daily x 2 weeks, then increase to 2 capsules (60mg) by mouth daily. Do not crush or chew.    DULoxetine (CYMBALTA) 60 mg, oral, Daily, To start after completing the 30mg capsules (30mg x 2 weeks, 60mg daily afterwards) Do not crush or chew.    minoxidil (LONITEN) 1.25 mg, oral, Daily    multivit-minerals/folic acid (ADULT ONE DAILY MULTIVITAMIN ORAL) Every morning    traZODone (Desyrel) 50 mg tablet Take half to one tablet by mouth about 30 minutes before " bedtime as needed for sleep    Zepbound 7.5 mg, subcutaneous, Every 7 days        Labs and Imaging:     Lab Results   Component Value Date    WBC 4.9 02/17/2025    HGB 12.8 02/17/2025    HCT 39.9 02/17/2025     02/17/2025    CHOL 197 02/17/2025    TRIG 61 02/17/2025    HDL 76 02/17/2025    ALT 20 02/17/2025    AST 24 02/17/2025     02/17/2025    K 4.3 02/17/2025     02/17/2025    CREATININE 0.65 02/17/2025    BUN 19 02/17/2025    CO2 29 02/17/2025    TSH 1.45 02/17/2025    INR 0.9 10/23/2024    HGBA1C 5.4 02/17/2025         Echocardiogram: No results found for this or any previous visit from the past 1825 days.    Stress Testing: No results found for this or any previous visit from the past 1825 days.    Cardiac Catheterization: No results found for this or any previous visit from the past 1825 days.    Cardiac Scoring: No results found for this or any previous visit from the past 1825 days.    AAA : No results found for this or any previous visit from the past 1825 days.    OTHER: No results found for this or any previous visit from the past 1825 days.      The 10-year ASCVD risk score (Dahiana ARRINGTON, et al., 2019) is: 1.3%    Values used to calculate the score:      Age: 59 years      Sex: Female      Is Non- : No      Diabetic: No      Tobacco smoker: No      Systolic Blood Pressure: 101 mmHg      Is BP treated: No      HDL Cholesterol: 76 mg/dL      Total Cholesterol: 197 mg/dL       Assessment:   59 y.o. female with a PMH of arthritis, asthma, obesity s/p bariatric surgery presents today for pre-operative clearance prior to R shoulder arthroscopy with rotator cuff repair on 4/23/2025.  EKG in the past has shown NSR q wave in lead III. She is able to perform all ADLs, including ambulating stairs, without chest pain. She has tolerated anesthesia in the past without issue.     1. Pre-op evaluation  Referral to Cardiology      2. Family history of heart disease  Referral to  Cardiology      3. Abnormal EKG  Referral to Cardiology      4. Shortness of breath  Transthoracic Echo (TTE) Complete           Plan:  -RCRI 1 without significant cardiac symptoms  -Acceptable risk to proceed with surgery  -TTE for structural and functional assessment (does not need to be done before surgery)  -Will call with results    ____________________________________________________________  Eldon Peacock, CNP  Cardiovascular Medicine   [facility] [place]  [facility]    Lab review: I have personally reviewed the laboratory result(s) Lipid panel, CBC, CMP  Diagnostic review: I have personally reviewed the result(s) of the EKG

## 2025-04-08 NOTE — H&P (VIEW-ONLY)
CPM/PAT Evaluation       Name: Arin Joseph (Arin Joseph)  /Age: 1965/59 y.o.     Visit Type:   In-Person       Chief Complaint: complete tear of R rotator cuff    HPI 60 yo female who is referred to PAT for evaluation by [facility] in advance of her R shoulder arthroscopy with rotator cuff repair on 2025. She has failed conservative therapy.She has had multiple injections with only short term relief.     MRI showed full-thickness tear of the rotator cuff with some tearing of the labrum, moderate to severe AC joint arthritis and biceps tendinosis     See PMH below      Past Medical History:   Diagnosis Date    Allergic     Anxiety     Arthritis     Asthma     Cluster headache ?    Depression     Joint pain ?    Low back pain ?    Migraine ?    Neck pain ?    Obesity     Osteoarthritis ?    Sleep apnea     no longer uses CPAP after bariatric surgery    Spinal stenosis ?       Past Surgical History:   Procedure Laterality Date    BARIATRIC SURGERY      BUNIONECTOMY      CHOLECYSTECTOMY      COLONOSCOPY      EPIDURAL BLOCK INJECTION  ?    KNEE CARTILAGE SURGERY Right     UPPER GASTROINTESTINAL ENDOSCOPY         Patient  reports never being sexually active.    Family History   Problem Relation Name Age of Onset    Heart disease Mother 81     Coronary artery disease Mother 81     Other (s/p cabg) Mother 81     Hypertension Father 81     Obesity Father 81     Atrial fibrillation Father 81     Heart failure Father 81     Other (CRF) Father 81     Arthritis Father 81     Alcohol abuse Father 81     Other (CVA) Brother 59     Stroke Brother 59     Breast cancer Maternal Grandmother Luci joseph     Migraines Maternal Grandmother Luci jake     Breast cancer Paternal Grandmother         Allergies   Allergen Reactions    House Dust Mite Other     congested, stuffy    Mold Other     congested, stuffy    Pollen Extracts Unknown       Medication Documentation Review Audit       Reviewed by Audra Márquez RN  (Registered Nurse) on 04/08/25 at 1030      Medication Order Taking? Sig Documenting Provider Last Dose Status   acetaminophen (TylenoL) 325 mg tablet [id number] Yes Take 2 tablets (650 mg) by mouth every 8 hours if needed for moderate pain (4 - 6) or mild pain (1 - 3). Kamlesh Provider, MD Past Week Active   albuterol (Ventolin HFA) 90 mcg/actuation inhaler [id number] Yes Inhale 2 puffs every 6 hours if needed for shortness of breath or wheezing. Kamlesh Provider, MD Past Week Active   ASPIRIN-ACETAMINOPHEN-CAFFEINE ORAL [id number] Yes Take 2 tablets by mouth 2 times a day as needed for headaches. Kamlesh Provider, MD 4/8/2025 Morning Active   calcium citrate-vitamin D3 500 mg-12.5 mcg (500 unit) tablet,chewable [id number] Yes Chew 1 tablet 2 times a day.   Patient taking differently: Chew 1 tablet once daily in the morning.    Kamlesh Provider, MD 4/8/2025 Active   cyanocobalamin (Vitamin B-12) 100 mcg tablet [id number] Yes Take 1 tablet (100 mcg) by mouth once daily in the morning. Kamlesh Provider, MD 4/8/2025 Active   DULoxetine (Cymbalta) 30 mg DR capsule [id number] Yes Take 1 capsule by mouth daily x 2 weeks, then increase to 2 capsules (60mg) by mouth daily. Do not crush or chew. Dariana Godoy MD 4/8/2025 Active   DULoxetine (Cymbalta) 60 mg DR capsule [id number] Yes Take 1 capsule (60 mg) by mouth once daily. To start after completing the 30mg capsules (30mg x 2 weeks, 60mg daily afterwards) Do not crush or chew.   Patient taking differently: Take 1 capsule (60 mg) by mouth once daily in the morning. To start after completing the 30mg capsules (30mg x 2 weeks, 60mg daily afterwards) Do not crush or chew.    Dariana Godoy MD 4/8/2025 Active   minoxidil (Loniten) 2.5 mg tablet [id number] Yes Take 0.5 tablets (1.25 mg) by mouth once daily.   Patient taking differently: Take 0.5 tablets (1.25 mg) by mouth once daily in the morning.    Danika German, APRN-CNP 4/8/2025 Active    multivit-minerals/folic acid (ADULT ONE DAILY MULTIVITAMIN ORAL) [id number] Yes Take by mouth once daily in the morning. As directed Historical Provider, MD 4/8/2025 Active   tirzepatide, weight loss, (Zepbound) 7.5 mg/0.5 mL injection [id number] Yes Inject 7.5 mg under the skin every 7 days.   Patient taking differently: Inject 7.5 mg under the skin every 7 days. Every Sunday morning    Dariana Godoy MD Past Week Active   traZODone (Desyrel) 50 mg tablet [id number] Yes Take half to one tablet by mouth about 30 minutes before bedtime as needed for sleep Dariana Godoy MD 4/7/2025 Active                         PAT ROS:   Constitutional:   neg    Neuro/Psych:   neg    Eyes:   neg    Ears:   neg    Nose:   neg    Mouth:   neg    Throat:   neg    Neck:   neg    Cardio:   neg    Respiratory:   neg    Endocrine:   neg    GI:   neg    :   neg    Musculoskeletal:    See HPI   arthralgias  Hematologic:   neg    Skin:  neg        Physical Exam  Constitutional:       Appearance: She is obese.   HENT:      Head: Normocephalic.      Nose: Nose normal.      Mouth/Throat:      Mouth: Mucous membranes are moist.      Pharynx: Oropharynx is clear.   Eyes:      Extraocular Movements: Extraocular movements intact.      Conjunctiva/sclera: Conjunctivae normal.      Pupils: Pupils are equal, round, and reactive to light.   Cardiovascular:      Rate and Rhythm: Normal rate and regular rhythm.      Pulses: Normal pulses.      Heart sounds: Normal heart sounds.   Pulmonary:      Effort: Pulmonary effort is normal.      Breath sounds: Normal breath sounds.   Abdominal:      General: Bowel sounds are normal.      Palpations: Abdomen is soft.   Musculoskeletal:         General: Tenderness (R shoulder and dec ROM) present.      Cervical back: Normal range of motion and neck supple.   Skin:     General: Skin is warm and dry.      Capillary Refill: Capillary refill takes 2 to 3 seconds.   Neurological:      General: No focal deficit  "present.      Mental Status: She is alert and oriented to person, place, and time.   Psychiatric:         Mood and Affect: Mood normal.         Behavior: Behavior normal.          PAT AIRWAY:   Airway:     Mallampati::  II    TM distance::  >3 FB    Neck ROM::  Full      Visit Vitals  /81   Pulse 74   Temp 36.1 °C (97 °F) (Temporal)   Resp 14   Ht 1.626 m (5' 4\")   Wt 99.9 kg (220 lb 3.8 oz)   SpO2 100%   BMI 37.80 kg/m²   OB Status Postmenopausal   Smoking Status Never   BSA 2.12 m²       DASI Risk Score      Flowsheet Row Pre-Admission Testing [facility]/8/[facility] in [facility]   Can you take care of yourself (eat, dress, bathe, or use toilet)?  2.75 filed at 04/08/2025 1108   Can you walk indoors, such as around your house? 1.75 filed at 04/08/2025 1108   Can you walk a block or two on level ground?  2.75 filed at 04/08/2025 1108   Can you climb a flight of stairs or walk up a hill? 5.5 filed at 04/08/2025 1108   Can you run a short distance? 8 filed at 04/08/2025 1108   Can you do light work around the house like dusting or washing dishes? 2.7 filed at 04/08/2025 1108   Can you do moderate work around the house like vacuuming, sweeping floors or carrying groceries? 3.5 filed at 04/08/2025 1108   Can you do heavy work around the house like scrubbing floors or lifting and moving heavy furniture?  0 filed at 04/08/2025 1108   Can you do yard work like raking leaves, weeding or pushing a mower? 0 filed at 04/08/2025 1108   Can you have sexual relations? 5.25 filed at 04/08/2025 1108   Can you participate in moderate recreational activities like golf, bowling, dancing, doubles tennis or throwing a baseball or football? 0 filed at 04/08/2025 1108   Can you participate in strenous sports like swimming, singles tennis, football, basketball, or skiing? 0 filed at 04/08/2025 1108   DASI SCORE 32.2 filed at 04/08/2025 1108   METS Score (Will be calculated only when all the questions are answered) 6.7 filed " at 04/08/2025 1108          Caprini DVT Assessment      Flowsheet Row Pre-Admission Testing from 4/8/2025 in [facility]   DVT Score (IF A SCORE IS NOT CALCULATING, MUST SELECT A BMI TO COMPLETE) 7 filed at 04/08/2025 1158   Surgical Factors Major surgery planned, lasting 2-3 hours filed at 04/08/2025 1158   BMI (BMI MUST BE CHOSEN) 31-40 (Obesity) filed at 04/08/2025 1158          Modified Frailty Index    No data to display       [id number] Stroke Risk Points  Current as of 31 minutes ago        N/A 0 to 9 Points:      Last Change: N/A          The [id number] risk score (Lip BRITTANY, et al. 2009. © 2010 [employer]) quantifies the risk of stroke for a patient with atrial fibrillation. For patients without atrial fibrillation or under the age of 18 this score appears as N/A. Higher score values generally indicate higher risk of stroke.        This score is not applicable to this patient. Components are not calculated.          Revised Cardiac Risk Index      Flowsheet Row Pre-Admission Testing from 4/8/2025 in [facility]   High-Risk Surgery (Intraperitoneal, Intrathoracic,Suprainguinal vascular) 0 filed at 04/08/2025 1200   History of ischemic heart disease (History of MI, History of positive exercuse test, Current chest paint considered due to myocardial ischemia, Use of nitrate therapy, ECG with pathological Q Waves) 1 filed at 04/08/2025 1200   History of congestive heart failure (pulmonary edemia, bilateral rales or S3 gallop, Paroxysmal nocturnal dyspnea, CXR showing pulmonary vascular redistribution) 0 filed at 04/08/2025 1200   History of cerebrovascular disease (Prior TIA or stroke) 0 filed at 04/08/2025 1200   Pre-operative insulin treatment 0 filed at 04/08/2025 1200   Pre-operative creatinine>2 mg/dl 0 filed at 04/08/2025 1200   Revised Cardiac Risk Calculator 1 filed at 04/08/2025 1200          Apfel Simplified Score      Flowsheet Row  Pre-Admission Testing from 4/8/2025 in [facility]   Smoking status 1 filed at 04/08/2025 1109   History of motion sickness or PONV  0 filed at 04/08/2025 1109   Use of postoperative opioids 0 filed at 04/08/2025 1109   Gender - Female 1=Yes filed at 04/08/2025 1109   Apfel Simplified Score Calculator 2 filed at 04/08/2025 1109          Risk Analysis Index Results This Encounter    No data found in the last 10 encounters.       Stop Bang Score      Flowsheet Row Pre-Admission Testing from 4/8/2025 in [facility] Clinical Support from 3/31/2025 in [facility]   Do you snore loudly? 0 filed at 04/08/2025 1033 0 filed at 03/31/2025 0914   Do you often feel tired or fatigued after your sleep? 1 filed at 04/08/2025 1033 1 filed at 03/31/2025 0914   Has anyone ever observed you stop breathing in your sleep? 1 filed at 04/08/2025 1033 1 filed at 03/31/2025 0914   Do you have or are you being treated for high blood pressure? 0 filed at 04/08/2025 1033 0 filed at 03/31/2025 0914   Recent BMI (Calculated) 37.8 filed at 04/08/2025 1033 38.6 filed at 03/31/2025 0914   Is BMI greater than 35 kg/m2? 1=Yes filed at 04/08/2025 1033 1=Yes filed at 03/31/2025 0914   Age older than 50 years old? 1=Yes filed at 04/08/2025 1033 1=Yes filed at 03/31/2025 0914   Is your neck circumference greater than 17 inches (Male) or 16 inches (Female)? 1 filed at 04/08/2025 1033 --   Gender - Male 0=No filed at 04/08/2025 1033 0=No filed at 03/31/2025 0914   STOP-BANG Total Score 5 filed at 04/08/2025 1033 --          Prodigy: High Risk  Total Score: 5              Prodigy SDB Score          ARISCAT Score for Postoperative Pulmonary Complications      Flowsheet Row Pre-Admission Testing from 4/8/2025 in [facility]   Age Calculated Score 3 filed at 04/08/2025 1201   Preoperative SpO2 0 filed at 04/08/2025 1201   Respiratory infection in the last month Either upper or lower (i.e., URI,  bronchitis, pneumonia), with fever and antibiotic treatment 0 filed at 04/08/2025 1201   Preoperative anemia (Hgb less than 10 g/dl) 0 filed at 04/08/2025 1201   Surgical incision  0 filed at 04/08/2025 1201   Duration of surgery  16 filed at 04/08/2025 1201   Emergency Procedure  0 filed at 04/08/2025 1201   ARISCAT Total Score  19 filed at 04/08/2025 1201          Tim Perioperative Risk for Myocardial Infarction or Cardiac Arrest (JOLANTA)      Flowsheet Row Pre-Admission Testing from 4/8/2025 in [facility]   Calculated Age Score 1.16 filed at 04/08/2025 1201   Functional Status  0 filed at 04/08/2025 1201   ASA Class  -5.17 filed at 04/08/2025 1201   Creatinine 0 filed at 04/08/2025 1201   Type of Procedure  0.80 filed at 04/08/2025 1201   JOLANTA Total Score  -8.46 filed at 04/08/2025 1201   JOLANTA % 0.02 filed at 04/08/2025 1201          Assessment and Plan   58 yo female. presents to [facility] for risk assessment and preoperative optimization in advance of her shoulder surgery    Today her BP is slightly elevated, she is afebrile, she has a good pulse ox on room air at rest    Neuro:  No diagnosis or significant findings on chart review or clinical presentation and evaluation.   H/O migraines    HEENT/Airway:  No diagnosis or significant findings on chart review or clinical presentation and evaluation.     Cardiovascular:  Denies chest pain, shortness of breathe, dizziness, palpations, or lightheadedness    METS are  6.7, RCRI is 1 (6.0% risk for 30 day postoperative MACE)  JOLANTA indicates a 0.02% risk of intraoperative or 30 day postoperative JOLANTA  No additional preoperative testing is currently indicated.    EKG - 4/8/25 preliminary NSR, cannot rule out inf. Or ant. Infarct age undetermined    Pulmonary:  Primary insomnia- managed per her PCP  Cont trazodone as prescribed     H/O GAYE does not use CPAP    PRODIGY: Low risk for opioid induced respiratory depression  Discussed smoking cessation and deep  breathing handout given    Renal:   No diagnosis or significant findings on chart review, or clinical presentation and evaluation.     Pt at Low risk for perioperative LILIAN based on Dynamic Predictive Scoring Tool for Perioperative LILIAN  Lab Results   Component Value Date    GLUCOSE 82 02/17/2025    CALCIUM 9.3 02/17/2025     02/17/2025    K 4.3 02/17/2025    CO2 29 02/17/2025     02/17/2025    BUN 19 02/17/2025    CREATININE 0.65 02/17/2025       Endocrine:  Obesity- she is on a GLP1  Lab Results   Component Value Date    HGBA1C 5.4 02/17/2025       Hematologic:  No diagnosis or significant findings on chart review or clinical presentation and evaluation.  Lab Results   Component Value Date    WBC 4.9 02/17/2025    HGB 12.8 02/17/2025    HCT 39.9 02/17/2025    MCV 95.5 02/17/2025     02/17/2025       CAPRINI SCORE 7  Pt supplied education/VTE handout    Gastrointestinal:   No diagnosis or significant findings on chart review or clinical presentation and evaluation.   EAT-10 score of 0 - self-perceived oropharyngeal dysphagia scale (0-40)      :  No diagnosis or significant findings on chart review or clinical presentation and evaluation.     Infectious disease:   No diagnosis or significant findings on chart review or clinical presentation and evaluation.     Musculoskeletal:   See HPI  Last dose of NSAIDS  7 days preop  Instructed if  no issues with your liver you may take Tylenol 2-500 mg tablets every 8 hrs around the clock for pain including morning of surgery with a sip of water    Preoperative risk assessment  ASA I  NSQIP - Predicted length of stay days 0-1  PAT Testing - EKG    Anesthesia:  No anesthesia complications    denies dental issues  Smoker- denies  Drugs-denies  ETOH-denies  denies personal/family issues with Anesthesia    I am referring her to Cardiology due to abn EKG, strong family h/o of heart disease    Face to Face patient contact time 45 mon    IRISH Solano-CNP  4/8/2025 12:05 PM

## 2025-04-08 NOTE — PREPROCEDURE INSTRUCTIONS
Medication List            Accurate as of April 8, 2025 10:55 AM. Always use your most recent med list.                ADULT ONE DAILY MULTIVITAMIN ORAL  Additional Medication Adjustments for Surgery: Take last dose 7 days before surgery     ASPIRIN-ACETAMINOPHEN-CAFFEINE ORAL  Additional Medication Adjustments for Surgery: Take last dose 7 days before surgery     calcium citrate-vitamin D3 500 mg-12.5 mcg (500 unit) tablet,chewable  Additional Medication Adjustments for Surgery: Take last dose 7 days before surgery     * DULoxetine 30 mg DR capsule  Commonly known as: Cymbalta  Take 1 capsule by mouth daily x 2 weeks, then increase to 2 capsules (60mg) by mouth daily. Do not crush or chew.  Medication Adjustments for Surgery: Take/Use as prescribed     * DULoxetine 60 mg DR capsule  Commonly known as: Cymbalta  Take 1 capsule (60 mg) by mouth once daily. To start after completing the 30mg capsules (30mg x 2 weeks, 60mg daily afterwards) Do not crush or chew.  Medication Adjustments for Surgery: Take/Use as prescribed     minoxidil 2.5 mg tablet  Commonly known as: Loniten  Take 0.5 tablets (1.25 mg) by mouth once daily.  Medication Adjustments for Surgery: Take/Use as prescribed     traZODone 50 mg tablet  Commonly known as: Desyrel  Take half to one tablet by mouth about 30 minutes before bedtime as needed for sleep  Medication Adjustments for Surgery: Do Not take on the morning of surgery     TylenoL 325 mg tablet  Generic drug: acetaminophen  Additional Medication Adjustments for Surgery: Other (Comment)  Notes to patient: No need to stop before surgery, ok on Day of surgery with sip of water if needed      Ventolin HFA 90 mcg/actuation inhaler  Generic drug: albuterol  Medication Adjustments for Surgery: Take/Use as prescribed     Vitamin B-12 100 mcg tablet  Generic drug: cyanocobalamin  Additional Medication Adjustments for Surgery: Take last dose 7 days before surgery     Zepbound 7.5 mg/0.5 mL  injection  Generic drug: tirzepatide (weight loss)  Inject 7.5 mg under the skin every 7 days.           * This list has 2 medication(s) that are the same as other medications prescribed for you. Read the directions carefully, and ask your doctor or other care provider to review them with you.                NPO Instructions:     Because you are on a GLP-1 medication per  preoperative protocol you are to hold solid food and non-clear liquids for 24 hours preoperatively. Clear liquids must be held 2 hours before your procedure. If you are diabetic please follow preoperative diabetic glucose management instructions below. If you have any further questions please contact our pre-admission testing department at [facility] at [phone].    Patients on GLP1 agonists (because of the delayed gastric emptying effect) should do an “extended fast”. Specifically, this means NPO for solids and nonclear liquids for 24 hrs. prior to surgery. High-caloric clear liquids (such as used in ERAS protocols) are stopped at 8 hrs. prior to procedure. Clear liquids held 2 hrs. prior to procedure.  Examples of clear liquids include black tea/coffee with no cream/sugar, water, apple juice, and electrolyte drinks (please avoid red drinks)  Additional Instructions:      Seven/Six Days before Surgery:  Review your medication instructions, stop indicated medications  Five Days before Surgery:  Review your medication instructions, stop indicated medications  Begin using your Hibiclens  Three Days before Surgery:  Review your medication instructions, stop indicated medications  The Day before Surgery:  Hold all solid food and non-clear liquids x 24 hours before your procedure  Hold clear liquids 2 hours before your procedure  No smoking or alcohol use 24 hours before surgery  Start using 0.12% CHG mouthwash  Review your medication instructions, stop indicated medications  You will be contacted regarding the time of your arrival to facility and  surgery time  Day of Surgery:  Review your medication instructions, take indicated medications  If you have diabetes, please check your fasting blood sugar upon awakening.  If fasting blood sugar is <80 mg/dl, drink 100 ml of apple juice, time limit of 2 hours before  Wear  comfortable loose fitting clothing  Do not use moisturizers, creams, lotions or perfume  All jewelry and valuables should be left at home     CONTACT SURGEON'S OFFICE IF YOU DEVELOP:  * Fever = 100.4 F   * New respiratory symptoms (e.g. cough, shortness of breath, respiratory distress, sore throat)  * Recent loss of taste or smell  *Flu like symptoms such as headache, fatigue or gastrointestinal symptoms  * You develop any open sores, shingles, burning or painful urination   AND/OR:  * You no longer wish to have the surgery.  * Any other personal circumstances change that may lead to the need to cancel or defer this surgery.  *You were admitted to any hospital within one week of your planned procedure.     SMOKING:  *Quitting smoking can make a huge difference to your health and recovery from surgery.    *If you need help with quitting, call 4-364-QUIT-NOW.     THE DAY BEFORE SURGERY:  *You may have up to TEN OUNCES of clear liquids until TWO hours before your instructed ARRIVAL TIME to hospital. This includes water, black tea/coffee, (no milk or cream) apple juice, clear broth and electrolyte drinks (Gatorade). Please avoid clear liquids that are red in color.   *You may chew gum/mints up to TWO hours before your surgery/procedure.    SURGICAL TIME:  *You will be contacted between 2 p.m. and 3 p.m. the business day before your surgery with your arrival time.  *If you haven't received a call by 3pm, call [phone]  *Scheduled surgery times may change and you will be notified if this occurs-check your personal voicemail for any updates.     ON THE MORNING OF SURGERY:  *Wear comfortable, loose fitting clothing.   *Do not use moisturizers,  creams, lotions or perfume.  *All jewelry and valuables should be left at home.  *Prosthetic devices such as contact lenses, hearing aids, dentures, eyelash extensions, hairpins and body piercing must be removed before surgery.     BRING WITH YOU:  *Photo ID and insurance card  *Current list of medications and allergies  *Pacemaker/Defibrillator/Heart stent cards  *CPAP machine and mask  *Slings/splints/crutches  *Copy of your complete Advanced Directive/DHPOA-if applicable  *Neurostimulator implant remote     PARKING AND ARRIVAL:  *Check in at the Main Entrance desk and let them know you are here for surgery.     IF YOU ARE HAVING OUTPATIENT/SAME DAY SURGERY:  *A responsible adult MUST accompany you at the time of discharge and stay with you for 24 hours after your surgery.  *You may NOT drive yourself home after surgery.  *You may use a taxi or ride sharing service ([employer], [employer]) to return home ONLY if you are accompanied by a friend or family member.  *Instructions for resuming your medications will be provided by your surgeon.     Thank you for coming to Pre Admission testing.      If I have prescribed medication please don't forget to  at your pharmacy.      Any questions about today's visit call [phone] and leave a message in the general mailbox.     Patient instructed to ambulate as soon as possible postoperatively to decrease thromboembolic risk.     IRISH Solano-CNP       Thank you for visiting [facility].  If you have any changes to your health condition, please call the surgeons office to alert them and give them details of your symptoms.        Preoperative Fasting Guidelines     Why must I stop eating and drinking near surgery time?  With sedation, food or liquid in your stomach can enter your lungs causing serious complications  Increases nausea and vomiting     When do I need to stop eating and drinking before my surgery?  Do not eat any food/nonclear liquid  for 24 hours prior to your surgery.  You can have clear liquids up until 2 hours before your surgery        Additional Instructions:      The Day before Surgery:  -Review your medication instructions, stop indicated medications  -You will be contacted in the evening regarding the time of your arrival to facility and surgery time     Day of Surgery:  -Review your medication instructions, take indicated medications  -Wear comfortable loose fitting clothing  -Do not use moisturizers, creams, lotions or perfume  -All jewelry and valuables should be left at home        Preoperative Brain Exercises     What are brain exercises?  A brain exercise is any activity that engages your thinking (cognitive) skills.     What types of activities are considered brain exercises?  Jigsaw puzzles, crossword puzzles, word jumble, memory games, word search, and many more.  Many can be found free online or on your phone via a mobile dhruv.     Why should I do brain exercises before my surgery?  More recent research has shown brain exercise before surgery can lower the risk of postoperative delirium (confusion) which can be especially important for older adults.  Patients who did brain exercises for 5 to 10 minutes/day the days before surgery, cut their risk of postoperative delirium in half up to 1 week after surgery.          [facility]     Preoperative Deep Breathing Exercises     Why it is important to do deep breathing exercises before my surgery?  Deep breathing exercises strengthen your breathing muscles.  This helps you to recover after your surgery and decreases the chance of breathing complications.        How are the deep breathing exercises done?  Sit straight with your back supported.  Breathe in deeply and slowly through your nose. Your lower rib cage should expand and your abdomen may move forward.  Hold that breath for 3 to 5 seconds.  Breathe out through pursed lips, slowly and completely.  Rest  and repeat 10 times every hour while awake.  Rest longer if you become dizzy or lightheaded.          [facility]     Preoperative Deep Breathing Exercises     Why it is important to do deep breathing exercises before my surgery?  Deep breathing exercises strengthen your breathing muscles.  This helps you to recover after your surgery and decreases the chance of breathing complications.        How are the deep breathing exercises done?  Sit straight with your back supported.  Breathe in deeply and slowly through your nose. Your lower rib cage should expand and your abdomen may move forward.  Hold that breath for 3 to 5 seconds.  Breathe out through pursed lips, slowly and completely.  Rest and repeat 10 times every hour while awake.  Rest longer if you become dizzy or lightheaded.        Patient Information: Incentive Spirometer  What is an incentive spirometer?  An incentive spirometer is a device used before and after surgery to “exercise” your lungs.  It helps you to take deeper breaths to expand your lungs.  Below is an example of a basic incentive spirometer.  The device you receive may differ slightly but they all function the same.    Why do I need to use an incentive spirometer?  Using your incentive spirometer prepares your lungs for surgery and helps prevent lung problems after surgery.  How do I use my incentive spirometer?  When you're using your incentive spirometer, make sure to breathe through your mouth. If you breathe through your nose, the incentive spirometer won't work properly. You can hold your nose if you have trouble.  If you feel dizzy at any time, stop and rest. Try again at a later time.  Follow the steps below:  Set up your incentive spirometer, expand the flexible tubing and connect to the outlet.  Sit upright in a chair or bed. Hold the incentive spirometer at eye level.   Put the mouthpiece in your mouth and close your lips tightly around it. Slowly breathe  out (exhale) completely.  Breathe in (inhale) slowly through your mouth as deeply as you can. As you take a breath, you will see the piston rise inside the large column. While the piston rises, the indicator should move upwards. It should stay in between the 2 arrows (see Figure).  Try to get the piston as high as you can, while keeping the indicator between the arrows.   If the indicator doesn't stay between the arrows, you're breathing either too fast or too slow.  When you get it as high as you can, hold your breath for 10 seconds, or as long as possible. While you're holding your breath, the piston will slowly fall to the base of the spirometer.  Once the piston reaches the bottom of the spirometer, breathe out slowly through your mouth. Rest for a few seconds.  Repeat 10 times. Try to get the piston to the same level with each breath.  Repeat every hour while awake  You can carefully clean the outside of the mouthpiece with an alcohol wipe or soap and water.       Patient and Family Education        Ways You Can Help Prevent Blood Clots               This handout explains some simple things you can do to help prevent blood clots.      Blood clots are blockages that can form in the body's veins. When a blood clot forms in your deep veins, it may be called a deep vein thrombosis, or DVT for short. Blood clots can happen in any part of the body where blood flows, but they are most common in the arms and legs. If a piece of a blood clot breaks free and travels to the lungs, it is called a pulmonary embolus (PE). A PE can be a very serious problem.         Being in the hospital or having surgery can raise your chances of getting a blood clot because you may not be well enough to move around as much as you normally do.         Ways you can help prevent blood clots in the hospital           Wearing SCDs. SCDs stands for Sequential Compression Devices.   SCDs are special sleeves that wrap around your legs  They attach  to a pump that fills them with air to gently squeeze your legs every few minutes.   This helps return the blood in your legs to your heart.   SCDs should only be taken off when walking or bathing.   SCDs may not be comfortable, but they can help save your life.                                            Wearing compression stockings - if your doctor orders them. These special snug fitting stockings gently squeeze your legs to help blood flow.       Walking. Walking helps move the blood in your legs.   If your doctor says it is ok, try walking the halls at least   5 times a day. Ask us to help you get up, so you don't fall.      Taking any blood thinning medicines your doctor orders.        Page 1 of 2            [facility]; 3/23   Ways you can help prevent blood clots at home         Wearing compression stockings - if your doctor orders them. ? Walking - to help move the blood in your legs.       Taking any blood thinning medicines your doctor orders.      Signs of a blood clot or PE        Tell your doctor or nurse know right away if you have of the problems listed below.    If you are at home, seek medical care right away. Call 911 for chest pain or problems breathing.          Signs of a blood clot (DVT) - such as pain,  swelling, redness or warmth in your arm or leg      Signs of a pulmonary embolism (PE) - such as chest pain or feeling short of breath

## 2025-04-09 LAB
ATRIAL RATE: 71 BPM
P AXIS: 6 DEGREES
P OFFSET: 175 MS
P ONSET: 130 MS
PR INTERVAL: 174 MS
Q ONSET: 217 MS
QRS COUNT: 12 BEATS
QRS DURATION: 82 MS
QT INTERVAL: 398 MS
QTC CALCULATION(BAZETT): 432 MS
QTC FREDERICIA: 421 MS
R AXIS: 13 DEGREES
T AXIS: 7 DEGREES
T OFFSET: 416 MS
VENTRICULAR RATE: 71 BPM

## 2025-04-15 ENCOUNTER — DOCUMENTATION (OUTPATIENT)
Dept: PREADMISSION TESTING | Facility: HOSPITAL | Age: 60
End: 2025-04-15

## 2025-04-15 ENCOUNTER — OFFICE VISIT (OUTPATIENT)
Dept: CARDIOLOGY | Facility: HOSPITAL | Age: 60
End: 2025-04-15
Payer: MEDICARE

## 2025-04-15 VITALS
HEART RATE: 88 BPM | WEIGHT: 221.78 LBS | SYSTOLIC BLOOD PRESSURE: 101 MMHG | OXYGEN SATURATION: 95 % | BODY MASS INDEX: 38.07 KG/M2 | DIASTOLIC BLOOD PRESSURE: 70 MMHG

## 2025-04-15 DIAGNOSIS — Z82.49 FAMILY HISTORY OF HEART DISEASE: ICD-10-CM

## 2025-04-15 DIAGNOSIS — R94.31 ABNORMAL EKG: ICD-10-CM

## 2025-04-15 DIAGNOSIS — R06.02 SHORTNESS OF BREATH: ICD-10-CM

## 2025-04-15 DIAGNOSIS — Z01.818 PRE-OP EVALUATION: Primary | ICD-10-CM

## 2025-04-15 PROCEDURE — 99214 OFFICE O/P EST MOD 30 MIN: CPT | Performed by: NURSE PRACTITIONER

## 2025-04-15 PROCEDURE — 99204 OFFICE O/P NEW MOD 45 MIN: CPT | Performed by: NURSE PRACTITIONER

## 2025-04-15 PROCEDURE — 1036F TOBACCO NON-USER: CPT | Performed by: NURSE PRACTITIONER

## 2025-04-15 ASSESSMENT — ENCOUNTER SYMPTOMS
MYALGIAS: 1
ARTHRALGIAS: 1
SHORTNESS OF BREATH: 1

## 2025-04-15 NOTE — CPM/PAT NURSE NOTE
CPM/PAT Nurse Note      Name: Arin Phan (Arin Phan)  /Age: 1965/59 y.o.     She saw cardiology on 4/15/25 for clearance for her upcoming shoulder arthroscopy.  Per cards note:   -RCRI 1 without significant cardiac symptoms  -Acceptable risk to proceed with surgery  -TTE for structural and functional assessment (does not need to be done before surgery)  -Will call with results

## 2025-04-18 ENCOUNTER — APPOINTMENT (OUTPATIENT)
Dept: PHARMACY | Facility: HOSPITAL | Age: 60
End: 2025-04-18
Payer: MEDICARE

## 2025-04-18 DIAGNOSIS — R73.03 PREDIABETES: ICD-10-CM

## 2025-04-18 DIAGNOSIS — E66.01 CLASS 3 SEVERE OBESITY DUE TO EXCESS CALORIES WITH SERIOUS COMORBIDITY AND BODY MASS INDEX (BMI) OF 40.0 TO 44.9 IN ADULT: ICD-10-CM

## 2025-04-18 DIAGNOSIS — Z78.9 WEIGHT LOSS ADVISED: ICD-10-CM

## 2025-04-18 DIAGNOSIS — E66.813 CLASS 3 SEVERE OBESITY DUE TO EXCESS CALORIES WITH SERIOUS COMORBIDITY AND BODY MASS INDEX (BMI) OF 40.0 TO 44.9 IN ADULT: ICD-10-CM

## 2025-04-18 PROCEDURE — RXMED WILLOW AMBULATORY MEDICATION CHARGE

## 2025-04-18 NOTE — PROGRESS NOTES
Clinical Pharmacy Appointment    Patient ID: Arin Phan is a 59 y.o. female who presents for Obesity.    Pt is here for Follow Up appointment.     Referring Provider/PCP: Jimenez Haines DO and Dr Dariana Godoy  Last visit with PCP: 3/25/25   Next visit with PCP: Not yet scheduled      Subjective     HPI  WEIGHT LOSS  BMI Readings from Last 3 Encounters:   04/15/25 38.07 kg/m²   04/08/25 37.80 kg/m²   03/25/25 38.62 kg/m²      Starting weight: 231 lbs. (12/10)  Current weight: 220 lbs.    Lifestyle (Low appetite)  Diet: 2 meals/day.  BK: Eggs, turkey sausage  LN: Usually skips  DN: Chicken, eggs, burgers, corn/peas  Snacks: Ritz peanut butter crackers, chips occasionally   Drinks: Crystal Light water  Has worked with nutritionist/dietician? Yes,    Physical Activity: On feet all day (), starting to use treadmill    Other Potential Contributing Factors  Alcohol use: Average 0 drinks/week  Tobacco use: No  Other drug use: No  Medications that may cause weight gain: none  Mental health: No current concerns  Eating Disorders: Denies Hx of any eating disorder  Comorbidities: Comorbidities: sleep apnea not using an appliance  (Had one, but stopped using)      Non-Pharmacological Therapy  Weight loss techniques attempted:  Self-directed dieting: Weight watchers in past    Pharmacological Therapy  Current Medications: Zepbound 7.5 mg on Sundays x 3  Adverse Effects: None  Previous Medications: Ozempic- fatigue, mental fog (through a med spa)    Insurance coverage of weight-loss medications? No,    Eligible for copay cards/programs? Yes,    Eligible for  PAP? Yes, reports income <400% FPL    Medication Estimated Cost Expected weight loss Patient Risks/Cautions Notes   Wegovy (semaglutide) ~$650/month w/ savings card 10-20% None      Zepbound (tirzepatide) $650/month   w/ savings card 10-20%     Qsymia (phentermine-topiramate) $98/month via Qsymia Eng[employer] 5-10% None Controlled substance   Contrave  (bupropion-naltrexone) $99/month   via [employer] 5-10% None    Adipex (phentermine) ~$15/month 3-5% None Controlled substance,  Short-term use only   Renato (OTC Orlistat) ~$60/month 3-5%  Adverse effects likely outweigh benefit.         Medication Reconciliation:  Changed:   Added:   Discontinued:     Drug Interactions  No relevant drug interactions were noted.    Medication System Management  Patients preferred pharmacy: [facility]  Adherence/Organization: No issues reported  Affordability/Accessibility: Zepbound through [facility] through 12/31/25      Objective   Allergies   Allergen Reactions    House Dust Mite Other     congested, stuffy    Mold Other     congested, stuffy    Pollen Extracts Unknown     Social History     Social History Narrative    Not on file      Medication Review  Current Outpatient Medications   Medication Instructions    acetaminophen (TYLENOL) 650 mg, Every 8 hours PRN    albuterol (Ventolin HFA) 90 mcg/actuation inhaler 2 puffs, Every 6 hours PRN    ASPIRIN-ACETAMINOPHEN-CAFFEINE ORAL 2 tablets, 2 times daily PRN    calcium citrate-vitamin D3 500 mg-12.5 mcg (500 unit) tablet,chewable 1 tablet, 2 times daily    cyanocobalamin (Vitamin B-12) 100 mcg tablet 1 tablet, Every morning    DULoxetine (Cymbalta) 30 mg DR capsule Take 1 capsule by mouth daily x 2 weeks, then increase to 2 capsules (60mg) by mouth daily. Do not crush or chew.    DULoxetine (CYMBALTA) 60 mg, oral, Daily, To start after completing the 30mg capsules (30mg x 2 weeks, 60mg daily afterwards) Do not crush or chew.    minoxidil (LONITEN) 1.25 mg, oral, Daily    multivit-minerals/folic acid (ADULT ONE DAILY MULTIVITAMIN ORAL) Every morning    traZODone (Desyrel) 50 mg tablet Take half to one tablet by mouth about 30 minutes before bedtime as needed for sleep    Zepbound 7.5 mg, subcutaneous, Every 7 days      Vitals  BP Readings from Last 2 Encounters:   04/15/25 101/70   04/08/25 147/81     BMI Readings from Last 1  "Encounters:   04/15/25 38.07 kg/m²      Labs  A1C  Lab Results   Component Value Date    HGBA1C 5.4 02/17/2025    HGBA1C 5.7 (H) 10/23/2024    HGBA1C 5.7 03/28/2018     BMP  Lab Results   Component Value Date    CALCIUM 9.3 02/17/2025     02/17/2025    K 4.3 02/17/2025    CO2 29 02/17/2025     02/17/2025    BUN 19 02/17/2025    CREATININE 0.65 02/17/2025    EGFR 102 02/17/2025     LFTs  Lab Results   Component Value Date    ALT 20 02/17/2025    AST 24 02/17/2025    ALKPHOS 80 02/17/2025    BILITOT 0.3 02/17/2025     FLP  Lab Results   Component Value Date    TRIG 61 02/17/2025    CHOL 197 02/17/2025    LDLCALC 106 (H) 02/17/2025    HDL 76 02/17/2025     Urine Microalbumin  No results found for: \"MICROALBCREA\"  Weight Management  Wt Readings from Last 3 Encounters:   04/15/25 101 kg (221 lb 12.5 oz)   04/08/25 99.9 kg (220 lb 3.8 oz)   03/25/25 102 kg (225 lb)      There is no height or weight on file to calculate BMI.     Assessment/Plan   Problem List Items Addressed This Visit       BMI 38.0-38.9,adult - Primary    Current regimen includes Zepbound. Patient's current weight reported as 220. Has lost at 11 lbs (5% of TBW) since starting therapy plan. Due to tolerance of medication, continue dose.     Medication Changes:  CONTINUE  Zepbound 7.5 mg once weekly            Relevant Orders    Referral to Clinical Pharmacy     Other Visit Diagnoses         Weight loss advised        Relevant Orders    Referral to Clinical Pharmacy      Prediabetes        Relevant Orders    Referral to Clinical Pharmacy      Class 3 severe obesity due to excess calories with serious comorbidity and body mass index (BMI) of 40.0 to 44.9 in adult        Relevant Orders    Referral to Clinical Pharmacy            Clinical Pharmacist follow-up: 5/14 at 8:40, Telehealth visit    Continue all meds under the continuation of care with the referring provider and clinical pharmacy team.    Thank you,  Bella Prieto, PharmD, " [facility]  Clinical Pharmacy Specialist  [phone]    Verbal consent to manage patient's drug therapy was obtained from the patient. They were informed they may decline to participate or withdraw from participation in pharmacy services at any time.

## 2025-04-18 NOTE — Clinical Note
Patient doing well on Zepbound, has lost 5 lbs in the last month since increasing to the 7.5 mg, reports no side effects and very low appetite. Will stay on 7.5 mg at this time.

## 2025-04-18 NOTE — ASSESSMENT & PLAN NOTE
Current regimen includes Zepbound. Patient's current weight reported as 220. Has lost at 11 lbs (5% of TBW) since starting therapy plan. Due to tolerance of medication, continue dose.     Medication Changes:  CONTINUE  Zepbound 7.5 mg once weekly

## 2025-04-22 ENCOUNTER — PHARMACY VISIT (OUTPATIENT)
Dept: PHARMACY | Facility: CLINIC | Age: 60
End: 2025-04-22
Payer: COMMERCIAL

## 2025-04-23 ENCOUNTER — ANESTHESIA (OUTPATIENT)
Dept: OPERATING ROOM | Facility: HOSPITAL | Age: 60
End: 2025-04-23
Payer: MEDICARE

## 2025-04-23 ENCOUNTER — ANESTHESIA EVENT (OUTPATIENT)
Dept: OPERATING ROOM | Facility: HOSPITAL | Age: 60
End: 2025-04-23
Payer: MEDICARE

## 2025-04-23 ENCOUNTER — HOSPITAL ENCOUNTER (OUTPATIENT)
Facility: HOSPITAL | Age: 60
Setting detail: OUTPATIENT SURGERY
Discharge: HOME | End: 2025-04-23
Attending: ORTHOPAEDIC SURGERY | Admitting: ORTHOPAEDIC SURGERY
Payer: MEDICARE

## 2025-04-23 VITALS
WEIGHT: 218.26 LBS | SYSTOLIC BLOOD PRESSURE: 118 MMHG | RESPIRATION RATE: 16 BRPM | BODY MASS INDEX: 37.26 KG/M2 | HEIGHT: 64 IN | DIASTOLIC BLOOD PRESSURE: 86 MMHG | HEART RATE: 79 BPM | TEMPERATURE: 97.5 F | OXYGEN SATURATION: 94 %

## 2025-04-23 DIAGNOSIS — M75.121 COMPLETE TEAR OF RIGHT ROTATOR CUFF, UNSPECIFIED WHETHER TRAUMATIC: Primary | ICD-10-CM

## 2025-04-23 PROCEDURE — 7100000009 HC PHASE TWO TIME - INITIAL BASE CHARGE: Performed by: ORTHOPAEDIC SURGERY

## 2025-04-23 PROCEDURE — 29826 SHO ARTHRS SRG DECOMPRESSION: CPT | Performed by: PHYSICIAN ASSISTANT

## 2025-04-23 PROCEDURE — 29823 SHO ARTHRS SRG XTNSV DBRDMT: CPT | Performed by: ORTHOPAEDIC SURGERY

## 2025-04-23 PROCEDURE — A29828 PR ARTHROSCOPY SHOULDER SURGICAL BICEPS TENODESIS: Performed by: NURSE ANESTHETIST, CERTIFIED REGISTERED

## 2025-04-23 PROCEDURE — 2500000004 HC RX 250 GENERAL PHARMACY W/ HCPCS (ALT 636 FOR OP/ED): Mod: JZ | Performed by: STUDENT IN AN ORGANIZED HEALTH CARE EDUCATION/TRAINING PROGRAM

## 2025-04-23 PROCEDURE — 2500000004 HC RX 250 GENERAL PHARMACY W/ HCPCS (ALT 636 FOR OP/ED): Performed by: STUDENT IN AN ORGANIZED HEALTH CARE EDUCATION/TRAINING PROGRAM

## 2025-04-23 PROCEDURE — 2500000004 HC RX 250 GENERAL PHARMACY W/ HCPCS (ALT 636 FOR OP/ED): Mod: JZ

## 2025-04-23 PROCEDURE — 3600000004 HC OR TIME - INITIAL BASE CHARGE - PROCEDURE LEVEL FOUR: Performed by: ORTHOPAEDIC SURGERY

## 2025-04-23 PROCEDURE — 2720000007 HC OR 272 NO HCPCS: Performed by: ORTHOPAEDIC SURGERY

## 2025-04-23 PROCEDURE — 3600000009 HC OR TIME - EACH INCREMENTAL 1 MINUTE - PROCEDURE LEVEL FOUR: Performed by: ORTHOPAEDIC SURGERY

## 2025-04-23 PROCEDURE — 3700000001 HC GENERAL ANESTHESIA TIME - INITIAL BASE CHARGE: Performed by: ORTHOPAEDIC SURGERY

## 2025-04-23 PROCEDURE — 29828 SHO ARTHRS SRG BICP TENODSIS: CPT | Performed by: ORTHOPAEDIC SURGERY

## 2025-04-23 PROCEDURE — 7100000010 HC PHASE TWO TIME - EACH INCREMENTAL 1 MINUTE: Performed by: ORTHOPAEDIC SURGERY

## 2025-04-23 PROCEDURE — A29828 PR ARTHROSCOPY SHOULDER SURGICAL BICEPS TENODESIS: Performed by: STUDENT IN AN ORGANIZED HEALTH CARE EDUCATION/TRAINING PROGRAM

## 2025-04-23 PROCEDURE — 2500000005 HC RX 250 GENERAL PHARMACY W/O HCPCS: Performed by: ORTHOPAEDIC SURGERY

## 2025-04-23 PROCEDURE — 7100000001 HC RECOVERY ROOM TIME - INITIAL BASE CHARGE: Performed by: ORTHOPAEDIC SURGERY

## 2025-04-23 PROCEDURE — 29826 SHO ARTHRS SRG DECOMPRESSION: CPT | Performed by: ORTHOPAEDIC SURGERY

## 2025-04-23 PROCEDURE — A4649 SURGICAL SUPPLIES: HCPCS | Performed by: ORTHOPAEDIC SURGERY

## 2025-04-23 PROCEDURE — 2500000004 HC RX 250 GENERAL PHARMACY W/ HCPCS (ALT 636 FOR OP/ED): Mod: JZ | Performed by: ORTHOPAEDIC SURGERY

## 2025-04-23 PROCEDURE — 29823 SHO ARTHRS SRG XTNSV DBRDMT: CPT | Performed by: PHYSICIAN ASSISTANT

## 2025-04-23 PROCEDURE — 2780000003 HC OR 278 NO HCPCS: Performed by: ORTHOPAEDIC SURGERY

## 2025-04-23 PROCEDURE — 2500000004 HC RX 250 GENERAL PHARMACY W/ HCPCS (ALT 636 FOR OP/ED): Mod: JZ | Performed by: NURSE ANESTHETIST, CERTIFIED REGISTERED

## 2025-04-23 PROCEDURE — 29827 SHO ARTHRS SRG RT8TR CUF RPR: CPT | Performed by: ORTHOPAEDIC SURGERY

## 2025-04-23 PROCEDURE — 29827 SHO ARTHRS SRG RT8TR CUF RPR: CPT | Performed by: PHYSICIAN ASSISTANT

## 2025-04-23 PROCEDURE — 2500000004 HC RX 250 GENERAL PHARMACY W/ HCPCS (ALT 636 FOR OP/ED): Performed by: PHYSICIAN ASSISTANT

## 2025-04-23 PROCEDURE — 29828 SHO ARTHRS SRG BICP TENODSIS: CPT | Performed by: PHYSICIAN ASSISTANT

## 2025-04-23 PROCEDURE — 3700000002 HC GENERAL ANESTHESIA TIME - EACH INCREMENTAL 1 MINUTE: Performed by: ORTHOPAEDIC SURGERY

## 2025-04-23 PROCEDURE — 64415 NJX AA&/STRD BRCH PLXS IMG: CPT | Performed by: STUDENT IN AN ORGANIZED HEALTH CARE EDUCATION/TRAINING PROGRAM

## 2025-04-23 PROCEDURE — 7100000002 HC RECOVERY ROOM TIME - EACH INCREMENTAL 1 MINUTE: Performed by: ORTHOPAEDIC SURGERY

## 2025-04-23 DEVICE — TKL FBRTK SPDBRG IMP SYS W/ BC SWVLK
Type: IMPLANTABLE DEVICE | Site: SHOULDER | Status: FUNCTIONAL
Brand: ARTHREX®

## 2025-04-23 RX ORDER — ACETAMINOPHEN 325 MG/1
650 TABLET ORAL EVERY 4 HOURS PRN
Status: DISCONTINUED | OUTPATIENT
Start: 2025-04-23 | End: 2025-04-23 | Stop reason: HOSPADM

## 2025-04-23 RX ORDER — BACITRACIN ZINC 500 UNIT/G
OINTMENT IN PACKET (EA) TOPICAL AS NEEDED
Status: DISCONTINUED | OUTPATIENT
Start: 2025-04-23 | End: 2025-04-23 | Stop reason: HOSPADM

## 2025-04-23 RX ORDER — OXYCODONE HYDROCHLORIDE 5 MG/1
10 TABLET ORAL EVERY 4 HOURS PRN
Status: DISCONTINUED | OUTPATIENT
Start: 2025-04-23 | End: 2025-04-23 | Stop reason: HOSPADM

## 2025-04-23 RX ORDER — MIDAZOLAM HYDROCHLORIDE 1 MG/ML
2 INJECTION, SOLUTION INTRAMUSCULAR; INTRAVENOUS ONCE
Status: COMPLETED | OUTPATIENT
Start: 2025-04-23 | End: 2025-04-23

## 2025-04-23 RX ORDER — LIDOCAINE HYDROCHLORIDE 10 MG/ML
INJECTION, SOLUTION INFILTRATION; PERINEURAL AS NEEDED
Status: DISCONTINUED | OUTPATIENT
Start: 2025-04-23 | End: 2025-04-23

## 2025-04-23 RX ORDER — ONDANSETRON HYDROCHLORIDE 2 MG/ML
INJECTION, SOLUTION INTRAVENOUS AS NEEDED
Status: DISCONTINUED | OUTPATIENT
Start: 2025-04-23 | End: 2025-04-23

## 2025-04-23 RX ORDER — HYDROMORPHONE HYDROCHLORIDE 0.2 MG/ML
0.2 INJECTION INTRAMUSCULAR; INTRAVENOUS; SUBCUTANEOUS EVERY 5 MIN PRN
Status: DISCONTINUED | OUTPATIENT
Start: 2025-04-23 | End: 2025-04-23 | Stop reason: HOSPADM

## 2025-04-23 RX ORDER — CEFAZOLIN SODIUM 2 G/100ML
2 INJECTION, SOLUTION INTRAVENOUS ONCE
Status: COMPLETED | OUTPATIENT
Start: 2025-04-23 | End: 2025-04-23

## 2025-04-23 RX ORDER — FENTANYL CITRATE 50 UG/ML
50 INJECTION, SOLUTION INTRAMUSCULAR; INTRAVENOUS ONCE
Status: COMPLETED | OUTPATIENT
Start: 2025-04-23 | End: 2025-04-23

## 2025-04-23 RX ORDER — ROCURONIUM BROMIDE 10 MG/ML
INJECTION, SOLUTION INTRAVENOUS AS NEEDED
Status: DISCONTINUED | OUTPATIENT
Start: 2025-04-23 | End: 2025-04-23

## 2025-04-23 RX ORDER — ONDANSETRON HYDROCHLORIDE 2 MG/ML
4 INJECTION, SOLUTION INTRAVENOUS ONCE AS NEEDED
Status: DISCONTINUED | OUTPATIENT
Start: 2025-04-23 | End: 2025-04-23 | Stop reason: HOSPADM

## 2025-04-23 RX ORDER — PROPOFOL 10 MG/ML
INJECTION, EMULSION INTRAVENOUS AS NEEDED
Status: DISCONTINUED | OUTPATIENT
Start: 2025-04-23 | End: 2025-04-23

## 2025-04-23 RX ORDER — FENTANYL CITRATE 50 UG/ML
INJECTION, SOLUTION INTRAMUSCULAR; INTRAVENOUS AS NEEDED
Status: DISCONTINUED | OUTPATIENT
Start: 2025-04-23 | End: 2025-04-23

## 2025-04-23 RX ORDER — OXYCODONE AND ACETAMINOPHEN 5; 325 MG/1; MG/1
1 TABLET ORAL EVERY 6 HOURS PRN
Qty: 28 TABLET | Refills: 0 | Status: SHIPPED | OUTPATIENT
Start: 2025-04-23 | End: 2025-04-30

## 2025-04-23 RX ORDER — PHENYLEPHRINE HCL IN 0.9% NACL 1 MG/10 ML
SYRINGE (ML) INTRAVENOUS AS NEEDED
Status: DISCONTINUED | OUTPATIENT
Start: 2025-04-23 | End: 2025-04-23

## 2025-04-23 RX ORDER — OXYCODONE HYDROCHLORIDE 5 MG/1
5 TABLET ORAL EVERY 4 HOURS PRN
Status: DISCONTINUED | OUTPATIENT
Start: 2025-04-23 | End: 2025-04-23 | Stop reason: HOSPADM

## 2025-04-23 RX ORDER — ALBUTEROL SULFATE 0.83 MG/ML
2.5 SOLUTION RESPIRATORY (INHALATION) ONCE AS NEEDED
Status: DISCONTINUED | OUTPATIENT
Start: 2025-04-23 | End: 2025-04-23 | Stop reason: HOSPADM

## 2025-04-23 RX ORDER — SODIUM CHLORIDE, SODIUM LACTATE, POTASSIUM CHLORIDE, CALCIUM CHLORIDE 600; 310; 30; 20 MG/100ML; MG/100ML; MG/100ML; MG/100ML
75 INJECTION, SOLUTION INTRAVENOUS CONTINUOUS
Status: DISCONTINUED | OUTPATIENT
Start: 2025-04-23 | End: 2025-04-23 | Stop reason: HOSPADM

## 2025-04-23 RX ADMIN — SUGAMMADEX 200 MG: 100 INJECTION, SOLUTION INTRAVENOUS at 10:49

## 2025-04-23 RX ADMIN — DEXAMETHASONE SODIUM PHOSPHATE 8 MG: 4 INJECTION INTRA-ARTICULAR; INTRALESIONAL; INTRAMUSCULAR; INTRAVENOUS; SOFT TISSUE at 09:59

## 2025-04-23 RX ADMIN — ONDANSETRON 4 MG: 2 INJECTION, SOLUTION INTRAMUSCULAR; INTRAVENOUS at 10:49

## 2025-04-23 RX ADMIN — Medication 100 MCG: at 10:13

## 2025-04-23 RX ADMIN — MIDAZOLAM 2 MG: 1 INJECTION INTRAMUSCULAR; INTRAVENOUS at 09:34

## 2025-04-23 RX ADMIN — Medication 100 MCG: at 10:06

## 2025-04-23 RX ADMIN — CEFAZOLIN SODIUM 2 G: 2 INJECTION, SOLUTION INTRAVENOUS at 09:56

## 2025-04-23 RX ADMIN — HYDROMORPHONE HYDROCHLORIDE 0.4 MG: 1 INJECTION, SOLUTION INTRAMUSCULAR; INTRAVENOUS; SUBCUTANEOUS at 11:28

## 2025-04-23 RX ADMIN — ROCURONIUM 20 MG: 50 INJECTION, SOLUTION INTRAVENOUS at 10:20

## 2025-04-23 RX ADMIN — HYDROMORPHONE HYDROCHLORIDE 0.2 MG: 0.2 INJECTION, SOLUTION INTRAMUSCULAR; INTRAVENOUS; SUBCUTANEOUS at 11:40

## 2025-04-23 RX ADMIN — LIDOCAINE HYDROCHLORIDE 3 ML: 10 INJECTION, SOLUTION INFILTRATION; PERINEURAL at 09:49

## 2025-04-23 RX ADMIN — FENTANYL CITRATE 50 MCG: 50 INJECTION INTRAMUSCULAR; INTRAVENOUS at 09:34

## 2025-04-23 RX ADMIN — PROPOFOL 50 MG: 10 INJECTION, EMULSION INTRAVENOUS at 10:52

## 2025-04-23 RX ADMIN — PROPOFOL 130 MG: 10 INJECTION, EMULSION INTRAVENOUS at 09:51

## 2025-04-23 RX ADMIN — SODIUM CHLORIDE, POTASSIUM CHLORIDE, SODIUM LACTATE AND CALCIUM CHLORIDE: 600; 310; 30; 20 INJECTION, SOLUTION INTRAVENOUS at 09:43

## 2025-04-23 RX ADMIN — ROCURONIUM 40 MG: 50 INJECTION, SOLUTION INTRAVENOUS at 09:52

## 2025-04-23 RX ADMIN — FENTANYL CITRATE 50 MCG: 0.05 INJECTION, SOLUTION INTRAMUSCULAR; INTRAVENOUS at 09:49

## 2025-04-23 ASSESSMENT — PAIN DESCRIPTION - LOCATION: LOCATION: SHOULDER

## 2025-04-23 ASSESSMENT — PAIN DESCRIPTION - DESCRIPTORS
DESCRIPTORS: ACHING
DESCRIPTORS: DULL;ACHING
DESCRIPTORS: ACHING
DESCRIPTORS: DULL;ACHING
DESCRIPTORS: ACHING

## 2025-04-23 ASSESSMENT — PAIN SCALES - GENERAL
PAINLEVEL_OUTOF10: 7
PAINLEVEL_OUTOF10: 0 - NO PAIN
PAINLEVEL_OUTOF10: 6
PAINLEVEL_OUTOF10: 0 - NO PAIN
PAINLEVEL_OUTOF10: 2
PAINLEVEL_OUTOF10: 3
PAINLEVEL_OUTOF10: 4
PAINLEVEL_OUTOF10: 4
PAINLEVEL_OUTOF10: 0 - NO PAIN
PAINLEVEL_OUTOF10: 4
PAINLEVEL_OUTOF10: 7
PAINLEVEL_OUTOF10: 5 - MODERATE PAIN
PAINLEVEL_OUTOF10: 5 - MODERATE PAIN
PAINLEVEL_OUTOF10: 0 - NO PAIN
PAINLEVEL_OUTOF10: 0 - NO PAIN
PAINLEVEL_OUTOF10: 5 - MODERATE PAIN
PAINLEVEL_OUTOF10: 6

## 2025-04-23 ASSESSMENT — PAIN - FUNCTIONAL ASSESSMENT
PAIN_FUNCTIONAL_ASSESSMENT: 0-10
PAIN_FUNCTIONAL_ASSESSMENT: WONG-BAKER FACES
PAIN_FUNCTIONAL_ASSESSMENT: 0-10
PAIN_FUNCTIONAL_ASSESSMENT: WONG-BAKER FACES
PAIN_FUNCTIONAL_ASSESSMENT: 0-10

## 2025-04-23 ASSESSMENT — COLUMBIA-SUICIDE SEVERITY RATING SCALE - C-SSRS
2. HAVE YOU ACTUALLY HAD ANY THOUGHTS OF KILLING YOURSELF?: NO
6. HAVE YOU EVER DONE ANYTHING, STARTED TO DO ANYTHING, OR PREPARED TO DO ANYTHING TO END YOUR LIFE?: NO
1. IN THE PAST MONTH, HAVE YOU WISHED YOU WERE DEAD OR WISHED YOU COULD GO TO SLEEP AND NOT WAKE UP?: NO

## 2025-04-23 ASSESSMENT — PAIN DESCRIPTION - ORIENTATION: ORIENTATION: LEFT

## 2025-04-23 NOTE — ANESTHESIA PROCEDURE NOTES
Airway  Date/Time: 4/23/2025 9:53 AM  Reason: elective    Airway not difficult    Staffing  Performed: CRNA   Authorized by: Melody Higginbotham MD    Performed by: IRISH Monteiro-ANNIKA  Patient location during procedure: OR    Patient Condition  Indications for airway management: anesthesia  Sedation level: deep     Final Airway Details   Preoxygenated: yes  Final airway type: endotracheal airway  Successful airway: ETT  Cuffed: yes   Successful intubation technique: direct laryngoscopy  Endotracheal tube insertion site: oral  Blade: Kayla  Blade size: #4  ETT size (mm): 7.0  Cormack-Lehane Classification: grade IIa - partial view of glottis  Placement verified by: chest auscultation and capnometry   Measured from: lips  ETT to lips (cm): 20  Number of attempts at approach: 1

## 2025-04-23 NOTE — ANESTHESIA POSTPROCEDURE EVALUATION
Patient: Arin Phan    Procedure Summary       Date: 04/23/25 Room / Location: [facility] / [facility]    Anesthesia Start: 0943 Anesthesia Stop: 1116    Procedure: ARTHROSCOPY, SHOULDER, WITH ROTATOR CUFF REPAIR/ ARTHROSCOPIC BICEP TENODESIS/ ARTHROSCOPIC DECOMPRESSION (Right: Shoulder) Diagnosis:       Complete tear of right rotator cuff, unspecified whether traumatic      (Complete tear of right rotator cuff, unspecified whether traumatic [M75.121])    Surgeons: Armani Vegas MD Responsible Provider: Melody Higginbotham MD    Anesthesia Type: regional, general ASA Status: 3            Anesthesia Type: regional, general    Vitals Value Taken Time   /88 04/23/25 11:22   Temp 36.2 °C (97.2 °F) 04/23/25 11:28   Pulse 80 04/23/25 11:48   Resp 14 04/23/25 11:48   SpO2 90 % 04/23/25 11:48   Vitals shown include unfiled device data.    Anesthesia Post Evaluation    Patient location during evaluation: bedside  Patient participation: complete - patient participated  Level of consciousness: awake and alert  Pain management: adequate  Multimodal analgesia pain management approach  Airway patency: patent  Cardiovascular status: acceptable  Respiratory status: acceptable  Hydration status: acceptable  Postoperative Nausea and Vomiting: none        No notable events documented.     ADMIT

## 2025-04-23 NOTE — ANESTHESIA PROCEDURE NOTES
Peripheral Block    Patient location during procedure: post-op  Medication administered at: 4/23/2025 9:35 AM  End time: 4/23/2025 9:36 AM  Reason for block: at surgeon's request and post-op pain management  Staffing  Performed: attending   Authorized by: Melody Higginbotham MD    Performed by: Melody Higginbotham MD  Preanesthetic Checklist  Completed: patient identified, IV checked, site marked, risks and benefits discussed, surgical consent, monitors and equipment checked, pre-op evaluation and timeout performed   Timeout performed at: 4/23/2025 9:33 AM  Peripheral Block  Patient position: sitting  Prep: ChloraPrep  Patient monitoring: heart rate, cardiac monitor and continuous pulse ox  Block type: interscalene  Laterality: right  Injection technique: single-shot  Guidance: ultrasound guided  Local infiltration: ropivacaine  Infiltration strength: 0.5 %  Dose: 20 mL  Needle  Needle gauge: 22 G  Needle length: 5 cm  Assessment  Injection assessment: negative aspiration for heme, no paresthesia on injection, incremental injection and local visualized surrounding nerve on ultrasound  Paresthesia pain: none  Heart rate change: no  Slow fractionated injection: yes  Additional Notes  Time out was performed with block nurse.  Site was prepped with chlorhexadine scrub.  Needle was inserted under ultrasound guidance and advanced to the correct position.  20mL Ropivacaine with 5mg Decadron was injected in 5cc increments with negative aspiration every 5ccs.  The patient tolerated the procedure well.  No complications.           [No Acute Distress] : no acute distress [Well Developed] : well developed [Well Nourished] : well nourished [Well-Appearing] : well-appearing [Normal Voice/Communication] : normal voice/communication [Normal Sclera/Conjunctiva] : normal sclera/conjunctiva [PERRL] : pupils equal round and reactive to light [EOMI] : extraocular movements intact [Normal Outer Ear/Nose] : the outer ears and nose were normal in appearance [No JVD] : no jugular venous distention [Supple] : supple [No Accessory Muscle Use] : no accessory muscle use [Clear to Auscultation] : lungs were clear to auscultation bilaterally [No Respiratory Distress] : no respiratory distress  [Normal Rate] : normal rate  [Regular Rhythm] : with a regular rhythm [No Carotid Bruits] : no carotid bruits [Normal S1, S2] : normal S1 and S2 [No Edema] : there was no peripheral edema [No Extremity Clubbing/Cyanosis] : no extremity clubbing/cyanosis [Normal Bowel Sounds] : normal bowel sounds [Soft] : abdomen soft [No Spinal Tenderness] : no spinal tenderness [No CVA Tenderness] : no CVA  tenderness [Normal Gait] : normal gait [No Focal Deficits] : no focal deficits [No Rash] : no rash [Speech Grossly Normal] : speech grossly normal [Normal Affect] : the affect was normal [Alert and Oriented x3] : oriented to person, place, and time [de-identified] : No sinus tenderness; wearing full facemask [de-identified] : R=16; good air entry; no wheezing noted [de-identified] : no stridor [de-identified] : no cords

## 2025-04-23 NOTE — ANESTHESIA PREPROCEDURE EVALUATION
Patient: Arin Phan    Procedure Information       Date/Time: 04/23/25 0905    Procedure: ARTHROSCOPY, SHOULDER, WITH ROTATOR CUFF REPAIR(arthrex) (Right: Shoulder)    Location: [facility] / [facility]    Surgeons: Armani Vegas MD            Relevant Problems   Anesthesia (within normal limits)      Cardiac (within normal limits)      Pulmonary   (+) Asthma   (+) Mild intermittent asthma without complication (HHS-HCC)   (+) Mild persistent asthma without complication (HHS-HCC)      Neuro   (+) Cervical nerve root impingement   (+) Depression with anxiety   (+) Radial tunnel syndrome      GI (within normal limits)      /Renal (within normal limits)      Liver (within normal limits)      Endocrine   (+) Hyperparathyroidism (Multi)   (+) Morbid (severe) obesity due to excess calories (Multi)   (+) Morbid obesity (Multi)      Hematology (within normal limits)      Musculoskeletal   (+) Osteoarthritis   (+) Osteoarthritis of knee   (+) Primary osteoarthritis of right knee   (+) Primary osteoarthritis, right shoulder      HEENT (within normal limits)      ID (within normal limits)      Skin (within normal limits)      GYN (within normal limits)     Past Medical History:   Diagnosis Date    Allergic     Anxiety     Arthritis     Asthma     Cluster headache ?    Depression     Joint pain ?    Low back pain ?    Migraine ?    Neck pain ?    Obesity     Osteoarthritis ?    Sleep apnea     no longer uses CPAP after bariatric surgery    Spinal stenosis ?       Past Surgical History:   Procedure Laterality Date    BARIATRIC SURGERY      BUNIONECTOMY      CHOLECYSTECTOMY      COLONOSCOPY      EPIDURAL BLOCK INJECTION  ?    KNEE CARTILAGE SURGERY Right     UPPER GASTROINTESTINAL ENDOSCOPY         Allergies   Allergen Reactions    House Dust Mite Other     congested, stuffy    Mold Other     congested, stuffy    Pollen Extracts Unknown       Clinical information reviewed:   Tobacco  Allergies  Meds   Med Hx  Surg  Hx  OB Status  Fam Hx  Soc   Hx        NPO Detail:  No data recorded     Physical Exam    Airway  Mallampati: II  TM distance: >3 FB  Neck ROM: full  Mouth opening: 3 or more finger widths     Cardiovascular    Dental    Pulmonary    Abdominal            Anesthesia Plan    History of general anesthesia?: yes  History of complications of general anesthesia?: no    ASA 3     regional and general     intravenous induction   Postoperative pain plan includes opioids.  Trial extubation is planned.  Anesthetic plan and risks discussed with patient.  Use of blood products discussed with patient who.

## 2025-04-23 NOTE — OP NOTE
ARTHROSCOPY, SHOULDER, WITH ROTATOR CUFF REPAIR/ ARTHROSCOPIC BICEP TENODESIS/ ARTHROSCOPIC DECOMPRESSION (R) Operative Note     Date: 2025  OR Location: [facility]    Name: Arin Phan, : 1965, Age: 59 y.o., MRN: [id number], Sex: female    Diagnosis  Pre-op Diagnosis      * Complete tear of right rotator cuff, unspecified whether traumatic [M75.121] Post-op Diagnosis     * Complete tear of right rotator cuff, unspecified whether traumatic [M75.121]     Procedures  ARTHROSCOPY, SHOULDER, WITH ROTATOR CUFF REPAIR/ ARTHROSCOPIC BICEP TENODESIS/ ARTHROSCOPIC DECOMPRESSION  [id number] - ID SURGICAL ARTHROSCOPY SHOULDER W/ROTATOR CUFF RPR    #1 right shoulder arthroscopic double row rotator cuff repair  #2 right shoulder arthroscopic biceps tenodesis  #3 right shoulder arthroscopic major joint debridement of labral tear biceps tear subscapular fraying with significant joint synovitis  #4 right shoulder arthroscopic anterior acromioplasty extensive decompression and bursectomy for severe subacromial bursitis with impingement lesion  Surgeons      * Armani Vegas - Primary    Resident/Fellow/Other Assistant:  Surgeons and Role:  * No surgeons found with a matching role *  Adriana Grullon PA-C  Staff:   Circulator: Brooke  Scrub Person: Virginie Jenkinsub Person: Audra Akers Circulator: Slime    Anesthesia Staff: Anesthesiologist: Melody Higginbotham MD  CRNA: IRISH Monteiro-CRNA    Procedure Summary  Anesthesia: Regional, General  ASA: III  Estimated Blood Loss: 5mL  Intra-op Medications:   Administrations occurring from 0905 to 1110 on 25:   Medication Name Total Dose   EPINEPHrine (Adrenalin) 2 mg in sodium chloride 3,000 mL irrigation 6,000 mL   bacitracin ointment 1 Application   dexAMETHasone (Decadron) injection 4 mg/mL 8 mg   fentaNYL (Sublimaze) injection 50 mcg/mL 50 mcg   LR bolus Cannot be calculated   lidocaine (Xylocaine) 1 % 3 mL   ondansetron (Zofran) 2 mg/mL injection 4 mg    phenylephrine 100 mcg/mL syringe 10 mL (prefilled) 200 mcg   propofol (Diprivan) injection 10 mg/mL 180 mg   rocuronium (ZeMuron) 50 mg/5 mL injection 60 mg   ceFAZolin (Ancef) 2 g in dextrose (iso)  mL 2 g   fentaNYL PF (Sublimaze) injection 50 mcg 50 mcg   midazolam (Versed) injection 2 mg 2 mg              Anesthesia Record               Intraprocedure I/O Totals       None           Specimen: No specimens collected              Drains and/or Catheters: * None in log *    Tourniquet Times:         Implants:  Implants       Type Name Action Serial No.       KNOTLESS FIBERTAK SPEEDBRIDGE IMPLANT SYSTEM Implanted               Findings: Massive rotator cuff tear with retraction with biceps tendon tear and a significant synovitis    Indications: Arin Phan is an 59 y.o. female who is having surgery for Complete tear of right rotator cuff, unspecified whether traumatic [M75.121].  Patient understands that her younger age with a large tear and these issues she is at risk for nerve artery tendon damage infection but most risk being recurrent tear need for future surgery such as a graft or even replacement long-term understands these risks and understands a long recovery phase wished to proceed.  Informed consent obtained    The patient was seen in the preoperative area. The risks, benefits, complications, treatment options, non-operative alternatives, expected recovery and outcomes were discussed with the patient. The possibilities of reaction to medication, pulmonary aspiration, injury to surrounding structures, bleeding, recurrent infection, the need for additional procedures, failure to diagnose a condition, and creating a complication requiring transfusion or operation were discussed with the patient. The patient concurred with the proposed plan, giving informed consent.  The site of surgery was properly noted/marked if necessary per policy. The patient has been actively warmed in preoperative area.  Preoperative antibiotics have been ordered and given within 1 hours of incision. Venous thrombosis prophylaxis have been ordered including bilateral sequential compression devices    Procedure Details: Pleasant patient brought the operating room and after sterilely prepping and draping forming timeout we placed the patient in the left lateral decubitus position well-padded on beanbag and axillary roll we easily in the joint and this showed the massive cuff tear and resolve the high-grade biceps tear large labral tear synovitis throughout some subscapular fraying we tenotomized the biceps after we performed an arthroscopic tenodesis, we did a major joint debridement debriding the labrum synovitis out the subscapular fraying but mostly subscap was intact for supraspinatus and large portion the infraspinatus was torn.  At this point after the major joint debridement we performed an arthroscopic biceps tenodesis with 3 passes through the biceps making an excellent grasping stitch.  We brought this down through the cinch mechanism in the anterior medial anchor we had good tension on the biceps then we tenotomized the biceps and debrided the stump back.  At this point we turned our attention to the subacromial space which was a large tear and took significant mobilization we did an arthroscopic subacromial decompression anterior chromium plasty and bursectomy and mobilized the cuff fully at this point patient was fully decompressed no significant preoperative AC symptoms but the deterioration was seen on MRI but no impinging lesion on the cuff at this point    This point we placed 2 medial all suture anchors did a double row suture bridge repair and brought this tissue anteriorly there was a small interval gap anteriorly but we brought good tissue back down to bone with decent quality bone and tissue quality.  At this point we had a good repair but again still large tear.  We did a a subacromial cleanout of all tissue  copious irrigated portals closed.  Patient was placed in abduction pillow sling there were no complications Adriana Grullon PA-C acted as surgical assistant during this case and her assistance greatly reduced operative time and aided in performance of the case  Evidence of Infection: No   Complications:  None; patient tolerated the procedure well.    Disposition: PACU - hemodynamically stable.  Condition: stable                 Additional Details: 0    Attending Attestation: I performed the procedure.    Armani Vegas  Phone Number: [phone]

## 2025-04-23 NOTE — PERIOPERATIVE NURSING NOTE
1114 Arrives to pacu 2 eyes closed arouses by verbal stimuli. Denies pain . Lungs clear all patient shaking head back and forth advised not to do  to prevent nausea. Compliant with coughing and deep breathing sfm 6 liters on lungs clear preopr block cough. Able to move fingers on right hand pulses palpable brisk capillarity refill. Ice man applied in Pacu. Karthik hugger for low temp. Abd soft. Moving lower extremities and left arm. IV patent to Left antecubital resting on pillow for patency,optimal lung expansion,   1124 Patient complaining of 7/10 aching pain. Facial grimacing describing pain on elizabeth of shoulder iceman repositioned.  1140 Remedicated for aching pain 4/10 Drinking beverage no nausea pain improving.  1150 Sleeping. Vitals stable facial expression calm. Circulation checks within normal limits.  1200 SDS updated on patient progress.  1212 TO SDS

## 2025-04-24 ASSESSMENT — PAIN SCALES - GENERAL: PAINLEVEL_OUTOF10: 7

## 2025-04-25 ENCOUNTER — EVALUATION (OUTPATIENT)
Dept: OCCUPATIONAL THERAPY | Facility: CLINIC | Age: 60
End: 2025-04-25
Payer: MEDICARE

## 2025-04-25 DIAGNOSIS — M75.121 COMPLETE TEAR OF RIGHT ROTATOR CUFF, UNSPECIFIED WHETHER TRAUMATIC: ICD-10-CM

## 2025-04-25 PROCEDURE — 97165 OT EVAL LOW COMPLEX 30 MIN: CPT | Mod: GO | Performed by: OCCUPATIONAL THERAPIST

## 2025-04-25 ASSESSMENT — ENCOUNTER SYMPTOMS
PAIN LOCATION: R SHOULDER
PAIN SCALE AT HIGHEST: 10
PAIN SCALE AT LOWEST: 3
PAIN SCALE: 3
QUALITY: SHARP

## 2025-04-25 NOTE — PROGRESS NOTES
"Evaluation    Patient Name: Arin Phan  MRN: [id number]  : 1965  Today's Date: 25    Time Calculation  Start Time: 1145  Stop Time: 1230  Time Calculation (min): 45 min  OT Evaluation Time Entry  OT Evaluation (Low) Time Entry: 15      Subjective   Current Problem/Diagnosis:  1. Complete tear of right rotator cuff, unspecified whether traumatic  Referral to Occupational Therapy        Subjective Evaluation    History of Present Illness  Date of onset: 2025  Date of surgery: 2025  Mechanism of injury: Patient is a 59-year-old female who sustained a fall on her steps in January resulting in R rotator tear. Patient underwent #1 right shoulder arthroscopic double row rotator cuff repair  #2 right shoulder arthroscopic biceps tenodesis  #3 right shoulder arthroscopic major joint debridement of labral tear biceps tear subscapular fraying with significant joint synovitis  #4 right shoulder arthroscopic anterior acromioplasty extensive decompression and bursectomy for severe subacromial bursitis with impingement lesion      Pain  Current pain rating: 3  At best pain rating: 3  At worst pain rating: 10  Location: R shoulder  Quality: sharp    Social Support  Lives in: apartment  Lives with: alone    Hand dominance: right    Diagnostic Tests  MRI studies: abnormal    Treatments  No previous or current treatments  Patient Goals  Patient goals for therapy: decreased edema, decreased pain, increased motion, increased strength, independence with ADLs/IADLs, return to sport/leisure activities and return to work  Patient goal: \"I need to be able to go back to work.\"      Precautions:  0-2 weeks: AAROM R elbow/FA  0-6 weeks: PROM R shoulder  6-12 weeks: AROM R shoulder  12+ weeks: Strengthening      Objective     Prior Functional Status: Fully Independent     Work History:     Occupation and Activities:  Work status: off work  Job title/type of work:  [employer]    Current functional limitations: Grooming, " Bathing, Dressing, Lifting, Holding, Writing, Crafts/hobbies, Tool handling, and Driving       Objective     Sensation: Denies abnormal     Appearance: +four arthroscopy sites at R shoulder with 2-3 sutures intact each; dry with good signs of healing     Edema: +Moderate proximally; Mild distally RUE    Coordination: +Impaired     Range of Motion/Strength:     Upper Extremity ROM    AROM PROM MMT MMT     Right Left Right Left Right Left   SHOULDER Flexion NT WFL 90 WFL NT WFL    Extension NT WFL Neutral  WFL NT WFL    Abduction NT WFL 60 WFL NT WFL    Internal Rotation NT WFL Neutral  WFL NT WFL    External Rotation NT WFL 10 WFL NT WFL   AAROM   ELBOW Extension -20 WFL 0 WFL NT WFL    Flexion  120  WFL NT WFL   AAROM  FOREARM Pronation 90 WFL 90 WFL NT WFL    Supination 60 WFL 75 WFL NT WFL     WRIST Flexion WFL WFL WFL WFL NT WFL    Extension WFL WFL WFL WFL NT WFL    Radial Deviation WFL WFL WFL WFL      Ulnar Deviation WFL WFL WFL WFL       Hand Range of Motion   All digits R hand WFL at all planes.      Hand Strength   (lbs) Right Left   1     2 NT NT   3     4     5       Pinch Right Left   2-pt NT NT   3-pt NT NT   Lateral NT NT       Outcome Measure: UEFI=7/80; 91% impaired     Splinting: +sling with abduction pillow intact on arrival     Modalities: Educated patient on use of hot/cold modalities for pain management and pre/post-HEP.     Therapy/Activity: Therapist debulked surgical dressing, performed skin hygiene/wound care; educated patient on wound care recommendations. Therapist educated patient on surgical precautions and functional implications for ADL's. Therapist initiated ADL retraining with instruction for compensatory tech's and task modifications to facilitate independence while effectively managing pain. Therapist provided demonstration with verbal instruction for AROM L hand composite flexion/extension, opposition, wrist flexion/extension, RD/UD, AAROM FA sup/pro and elbow  "flexion/extension x10 reps each; written handout issued; HEP established. Therapist provided demonstration with verbal instruction for scapular mobilization including elevation/depression, protraction/retraction, and CW/CCW circumduction x10 reps each; written handout issued. Therapist provided demonstration with verbal instruction for forward/backward, side-to-side, and CW/CCW pendulums; written handout issued.     EDUCATION: See above.        Assessment & Plan     Assessment  Impairments: abnormal coordination, abnormal or restricted ROM, activity intolerance, impaired physical strength, lacks appropriate home exercise program, pain with function and weight-bearing intolerance  Prognosis: good    Goals  \"I need to be able to go back to work.\"    Plan  Planned modality interventions: cryotherapy, TENS, thermotherapy (hydrocollator packs) and ultrasound (Dry needling)  Planned therapy interventions: ADL retraining, flexibility, functional ROM exercises, home exercise program, IADL retraining, joint mobilization, manual therapy, motor coordination training, neuromuscular re-education, soft tissue mobilization, strengthening, stretching and therapeutic activities  Frequency: 1-2x.  Duration in visits: 20  Treatment plan discussed with: patient  Plan details: Un[employer]ot[employer]; Evaluation only; $10 copay            Goals:  Active       OT Goals       1. Patient will increase R shoulder PROM flexion and abduction to 130 degrees for improved ease of ADL's.       Start:  04/25/25    Expected End:  06/06/25            2. Patient will increase R shoulder PROM ER to 60 degrees for increased ease of ADL's.       Start:  04/25/25    Expected End:  06/06/25            3. Patient will increase R shoulder AROM flexion and abduction to 130 degrees for improved ease of ADL's.       Start:  04/25/25    Expected End:  08/15/25            4. Patient will increase R shoulder AROM ER to 60 degrees for increased ease of ADL's.       " Start:  04/25/25    Expected End:  08/15/25            5. Patient will increase R shoulder strength to at least 4+/5 all planes to facilitate return to work as hairdresser.       Start:  04/25/25    Expected End:  08/15/25            6. Patient will increase overall functional ease and independence AEB UEFI score of at least 65/80 for improved quality of life for patient.       Start:  04/25/25    Expected End:  08/15/25

## 2025-04-28 ENCOUNTER — TELEPHONE (OUTPATIENT)
Dept: OCCUPATIONAL THERAPY | Facility: CLINIC | Age: 60
End: 2025-04-28
Payer: MEDICARE

## 2025-04-28 NOTE — TELEPHONE ENCOUNTER
OT approved for 20 visits, called patient and left voicemail to schedule 16 appts per Lynn. 1x week for 16 weeks.

## 2025-04-30 ENCOUNTER — TREATMENT (OUTPATIENT)
Dept: OCCUPATIONAL THERAPY | Facility: CLINIC | Age: 60
End: 2025-04-30
Payer: MEDICARE

## 2025-04-30 DIAGNOSIS — M75.121 COMPLETE TEAR OF RIGHT ROTATOR CUFF, UNSPECIFIED WHETHER TRAUMATIC: ICD-10-CM

## 2025-04-30 PROCEDURE — 97110 THERAPEUTIC EXERCISES: CPT | Mod: GO | Performed by: OCCUPATIONAL THERAPIST

## 2025-04-30 ASSESSMENT — PAIN SCALES - GENERAL: PAINLEVEL_OUTOF10: 2

## 2025-04-30 ASSESSMENT — PAIN - FUNCTIONAL ASSESSMENT: PAIN_FUNCTIONAL_ASSESSMENT: 0-10

## 2025-04-30 NOTE — PROGRESS NOTES
"Occupational Therapy Treatment  Patient Name: Arin Phan  MRN: [id number]  OT Received on: 4/30/2025        Time Calculation  Start Time: 0930  Stop Time: 1015  Time Calculation (min): 45 min  OT Modalities Time Entry  Hot/Cold Pack Time Entry: 5  OT Therapeutic Procedures Time Entry  Therapeutic Exercise Time Entry: 40    Insurance:  Visit Number: 2 of 20  Authorization #: [id number]  Authorization Dates: 4/28/25-8/29/25  Approved Codes: 95500, 37742, 07837, 68765, 68418  Insurance Type: [employer]      Subjective   Problem List Items Addressed This Visit           ICD-10-CM    Complete tear of right rotator cuff M75.121     Current Problem/Reason for visit:  S/p R RTC repair on 4/23/25    Referred by: Dr. Vegas    Patient reports \"I am doing much better today than last time.\"    Precautions:   0-2 weeks: AAROM R elbow/FA  0-6 weeks: PROM R shoulder  6-12 weeks: AROM R shoulder  12+ weeks: Strengthening    Performing HEP?: Yes    Pain:  Pain Assessment  Pain Assessment: 0-10  0-10 (Numeric) Pain Score: 2  Pain Location: Shoulder  Pain Orientation: Right    Objective     Patient reports PRN use of Ibuprofen for pain management.      AROM PROM     Right Left Right Left   SHOULDER Flexion NT  WFL    Extension NT WFL Neutral  WFL    Abduction NT  WFL    Internal Rotation NT WFL Neutral  WFL    External Rotation NT WFL 70 WFL     Physical Observation: Patient into clinic with sling donned with abduction pillow  Edema: Mild/moderate RUE  Sensory: Intact   Numbness/Tingling: N/a      Treatment:    Modalities: Moist heat R shoulder x5 minutes.    Therapeutic Exercise:  AROM L hand composite flexion/extension, opposition, wrist flexion/extension, RD/UD x1 reps each.   AAROM FA sup/pro and elbow flexion/extension x10 reps each  Scapular mobilization including elevation/depression, protraction/retraction, and CW/CCW circumduction x10 reps each  Forward/backward, side-to-side, and CW/CCW pendulums " x10 reps.  Countertop leans x10 reps with prolonged hold at end range.  Supine PROM R shoulder flexion, abduction, scaption, and ER x10 reps each.      Post-tx pain: 2/10    Assessment/Plan Patient with much improved PROM at R shoulder this date; improving pain, and functional ease reported. Patient is making steady progress and is on track at this time. Will continue to advance intervention as indicated/tolerated.      OP EDUCATION:  Education  Individual(s) Educated: Patient  Education Provided: Diagnosis & Precautions (POst-op protocol)  Home Program: PROM, AAROM, Modalities, Orthotic wearing schedule, care and precautions    Goals:  Active       OT Goals       1. Patient will increase R shoulder PROM flexion and abduction to 130 degrees for improved ease of ADL's. (Met)       Start:  04/25/25    Expected End:  06/06/25    Resolved:  04/30/25         2. Patient will increase R shoulder PROM ER to 60 degrees for increased ease of ADL's. (Met)       Start:  04/25/25    Expected End:  06/06/25    Resolved:  04/30/25         3. Patient will increase R shoulder AROM flexion and abduction to 130 degrees for improved ease of ADL's.       Start:  04/25/25    Expected End:  08/15/25            4. Patient will increase R shoulder AROM ER to 60 degrees for increased ease of ADL's.       Start:  04/25/25    Expected End:  08/15/25            5. Patient will increase R shoulder strength to at least 4+/5 all planes to facilitate return to work as hairdresser.       Start:  04/25/25    Expected End:  08/15/25            6. Patient will increase overall functional ease and independence AEB UEFI score of at least 65/80 for improved quality of life for patient.       Start:  04/25/25    Expected End:  08/15/25

## 2025-05-05 ENCOUNTER — APPOINTMENT (OUTPATIENT)
Dept: ORTHOPEDIC SURGERY | Facility: CLINIC | Age: 60
End: 2025-05-05
Payer: MEDICARE

## 2025-05-05 DIAGNOSIS — M75.121 COMPLETE TEAR OF RIGHT ROTATOR CUFF, UNSPECIFIED WHETHER TRAUMATIC: Primary | ICD-10-CM

## 2025-05-05 PROCEDURE — 1036F TOBACCO NON-USER: CPT | Performed by: PHYSICIAN ASSISTANT

## 2025-05-05 PROCEDURE — 99024 POSTOP FOLLOW-UP VISIT: CPT | Performed by: PHYSICIAN ASSISTANT

## 2025-05-05 ASSESSMENT — PAIN SCALES - GENERAL: PAINLEVEL_OUTOF10: 2

## 2025-05-05 ASSESSMENT — PAIN - FUNCTIONAL ASSESSMENT: PAIN_FUNCTIONAL_ASSESSMENT: 0-10

## 2025-05-05 ASSESSMENT — PATIENT HEALTH QUESTIONNAIRE - PHQ9
1. LITTLE INTEREST OR PLEASURE IN DOING THINGS: NOT AT ALL
2. FEELING DOWN, DEPRESSED OR HOPELESS: NOT AT ALL
SUM OF ALL RESPONSES TO PHQ9 QUESTIONS 1 AND 2: 0

## 2025-05-05 NOTE — PROGRESS NOTES
Reason for Appointment  Chief Complaint   Patient presents with    Right Shoulder - Post-op     History of Present Illness  Patient is here today 2 weeks status post a right shoulder arthroscopic rotator cuff repair and arthroscopic biceps tenodesis on 4/23/2025.  She is doing well, wounds are healing nicely with no signs of infection, sutures were removed today.  She is working in therapy on passive shoulder motion for another 4 weeks.  We went over her intraoperative pictures and she understands postoperative protocol fully.  We will follow-up with her in 4 weeks.    Assessment   Right rotator cuff tear

## 2025-05-07 ENCOUNTER — TREATMENT (OUTPATIENT)
Dept: OCCUPATIONAL THERAPY | Facility: CLINIC | Age: 60
End: 2025-05-07
Payer: MEDICARE

## 2025-05-07 DIAGNOSIS — M75.121 COMPLETE TEAR OF RIGHT ROTATOR CUFF, UNSPECIFIED WHETHER TRAUMATIC: ICD-10-CM

## 2025-05-07 PROCEDURE — 97110 THERAPEUTIC EXERCISES: CPT | Mod: GO,CO

## 2025-05-07 ASSESSMENT — PAIN DESCRIPTION - DESCRIPTORS: DESCRIPTORS: OTHER (COMMENT)

## 2025-05-07 ASSESSMENT — PAIN SCALES - GENERAL: PAINLEVEL_OUTOF10: 2

## 2025-05-07 ASSESSMENT — PAIN - FUNCTIONAL ASSESSMENT: PAIN_FUNCTIONAL_ASSESSMENT: 0-10

## 2025-05-07 NOTE — PROGRESS NOTES
"Occupational Therapy Treatment  Patient Name: Arin Phan  MRN: [id number]  OT Received on: 5/7/2025 OT Received On: 05/07/25  Time Calculation  Start Time: 1030  Stop Time: 1120  Time Calculation (min): 50 min  OT Therapeutic Procedures Time Entry  Therapeutic Exercise Time Entry: 40  Non-Billable Time  Non-billable time: 10  Non-billable time reason: 5 heat/5 ice  Insurance:  Visit Number: 3 of 20    4/28/25 - OT AUTH RECEIVED FOR 20 VISITS (80 UNITS) FOR [id number], [id number], [id number], [id number], 71922 FROM 4/28/25 TO 8/29/25 AUTH # [id number]  JS  4/25/25 - [employer] / EVAL ONLY / 20 VISIT LIMIT YEAR - 0 USED 4/25/25 / $10 CO PAY / OOP 3050 NOT MET / C/S W/S & AVAILITY # [id number]    Subjective   Problem List Items Addressed This Visit           ICD-10-CM       Musculoskeletal and Injuries    Complete tear of right rotator cuff M75.121       1. Complete tear of right rotator cuff, unspecified whether traumatic  Referral to Occupational Therapy            History of Present Illness  Date of onset: 1/8/2025  Date of surgery: 4/23/2025  Mechanism of injury: Patient is a 59-year-old female who sustained a fall on her steps in January resulting in R rotator tear. Patient underwent #1 right shoulder arthroscopic double row rotator cuff repair  #2 right shoulder arthroscopic biceps tenodesis  #3 right shoulder arthroscopic major joint debridement of labral tear biceps tear subscapular fraying with significant joint synovitis  #4 right shoulder arthroscopic anterior acromioplasty extensive decompression and bursectomy for severe subacromial bursitis with impingement lesion        Referred by: Dr. Vegas, next follow up  6/2/25    Patient reports \"it is doing really good' , no modalities, educated in importance of modality use. Using oxy  and ibupfrofen the last 3 of 7 days. Wearing sling at all times when up and moving and sleeping .     Precautions:   0-2 weeks: AAROM R elbow/FA  5/7/25  0-6 weeks: PROM R shoulder " "-current   6-12 weeks: AROM R shoulder 6/4/25  12+ weeks: Strengthening 7/16/25    Performing HEP?: Yes    Pain: 1-2/10 in front of joint capsule \" biting\" only with movement\".   Pain Assessment  Pain Assessment: 0-10  0-10 (Numeric) Pain Score: 2  Pain Type: Acute pain, Surgical pain  Pain Location: Shoulder  Pain Orientation: Right, Upper  Pain Descriptors: Other (Comment) (biting)  Pain Frequency: Intermittent  Objective   Upper Extremity ROM                AROM PROM MMT MMT       Right Left Right Left Right Left   SHOULDER Flexion NT  ( was 90) WFL NT WFL     Extension NT WFL Neutral  WFL NT WFL     Abduction NT  ( was 60) WFL NT WFL     Internal Rotation NT WFL Neutral  WFL NT WFL     External Rotation NT WFL 70 ( was 10)  WFL NT WFL   AAROM   ELBOW Extension -20 WFL 0 WFL NT WFL     Flexion  120  ( was 130)  WFL NT WFL   AAROM  FOREARM Pronation 90 WFL 90( same)  WFL NT WFL     Supination 60 WFL 75( same )  WFL NT WFL      WRIST Flexion WFL WFL WFL WFL NT WFL     Extension WFL WFL WFL WFL NT WFL     Radial Deviation WFL WFL WFL WFL         Ulnar Deviation WFL WFL WFL WFL       Physical Observation: healing incisions, light scabbing . No excessive redness or drainage.   Edema: none     Sensory: impaired  Numbness/Tingling: noted in hand \" comes and goes. \"     Treatment:  Educated pt in scar massage. To begin on Saturday.  Written information provided     Modalities:  Moist heat applied x 5 min at start of session during goal review to promote muscle movement during session  Ice applied x 5 min at end of session during wrap up to reduce edema and inflammation.   Educated pt in benefit of biofreeze use for pain relief.            Therapeutic Exercise:  AROM L hand composite flexion/extension, opposition, wrist flexion/extension, RD/UD, AROM  FA sup/pro and elbow flexion/extension x10 reps each  scapular mobilization including elevation/depression, protraction/retraction, and CW/CCW " circumduction x10 reps each   forward/backward, side-to-side, and CW/CCW pendulums x 12 reps with good form    Counter top walk aways x 10 reps with 5 sec hold at end range  for PROM shoulder flexion and abduction  Writer performed  gentle  supine PROM of supine shoulder flexion, abduction, pt performed ER with dowel matthew x 8 reps with hold at end range and good tolerance by patient.   Post-tx pain: 2/10, unchanged since start of session    Assessment/Plan Pt motivated to return to function.  Noted increased PROM in shoulder and AROM elbow..   Pt to increase modality use, continue sling wear for 4 weeks. To initiate scar massage this weekend. Pt to continue with HEP with added walk aways and supine ER with dowel matthew only as tolerated for pain. Plan to progress patient according to protocol and as tolerated for pain.   OP EDUCATION:  Education  Individual(s) Educated: Patient  Education Provided: Diagnosis & Precautions, Orthotics wearing schedule and precautions, Edema control, Wound care, POC discussed and agreed upon, Risk and benefits of OT discussed with patient or other, Joint protection and energy conservation, Ergonomics and postural realignment (POst-op protocol)  Home Program: PROM, AAROM, Modalities, Orthotic wearing schedule, care and precautions, Handout issued, Wound/scar care, Edema control, Activity modification  Equipment: Other (dowel matthew)  Risk and Benefits Discussed with Patient/Caregiver/Other: yes  Patient/Caregiver Demonstrated Understanding: yes  Plan of Care Discussed and Agreed Upon: yes  Patient Response to Education: Patient/Caregiver Verbalized Understanding of Information, Patient/Caregiver Performed Return Demonstration of Exercises/Activities, Patient/Caregiver Asked Appropriate Questions    Goals:  Active       OT Goals       1. Patient will increase R shoulder PROM flexion and abduction to 130 degrees for improved ease of ADL's. (Met)       Start:  04/25/25    Expected End:  06/06/25     Resolved:  04/30/25         2. Patient will increase R shoulder PROM ER to 60 degrees for increased ease of ADL's. (Met)       Start:  04/25/25    Expected End:  06/06/25    Resolved:  04/30/25         3. Patient will increase R shoulder AROM flexion and abduction to 130 degrees for improved ease of ADL's. (Progressing)       Start:  04/25/25    Expected End:  08/15/25            4. Patient will increase R shoulder AROM ER to 60 degrees for increased ease of ADL's. (Progressing)       Start:  04/25/25    Expected End:  08/15/25            5. 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 Patient will increase overall functional ease and independence AEB UEFI score of at least 65/80 for improved quality of life for patient. (Progressing)       Start:  04/25/25    Expected End:  08/15/25

## 2025-05-14 ENCOUNTER — TREATMENT (OUTPATIENT)
Dept: OCCUPATIONAL THERAPY | Facility: CLINIC | Age: 60
End: 2025-05-14
Payer: MEDICARE

## 2025-05-14 ENCOUNTER — APPOINTMENT (OUTPATIENT)
Dept: PHARMACY | Facility: HOSPITAL | Age: 60
End: 2025-05-14
Payer: MEDICARE

## 2025-05-14 DIAGNOSIS — E66.813 CLASS 3 SEVERE OBESITY DUE TO EXCESS CALORIES WITH SERIOUS COMORBIDITY AND BODY MASS INDEX (BMI) OF 40.0 TO 44.9 IN ADULT: ICD-10-CM

## 2025-05-14 DIAGNOSIS — Z78.9 WEIGHT LOSS ADVISED: ICD-10-CM

## 2025-05-14 DIAGNOSIS — R73.03 PREDIABETES: ICD-10-CM

## 2025-05-14 DIAGNOSIS — M75.121 COMPLETE TEAR OF RIGHT ROTATOR CUFF, UNSPECIFIED WHETHER TRAUMATIC: ICD-10-CM

## 2025-05-14 PROCEDURE — 97110 THERAPEUTIC EXERCISES: CPT | Mod: GP,CO

## 2025-05-14 PROCEDURE — RXMED WILLOW AMBULATORY MEDICATION CHARGE

## 2025-05-14 ASSESSMENT — PAIN - FUNCTIONAL ASSESSMENT: PAIN_FUNCTIONAL_ASSESSMENT: 0-10

## 2025-05-14 ASSESSMENT — PAIN SCALES - GENERAL: PAINLEVEL_OUTOF10: 3

## 2025-05-14 NOTE — PROGRESS NOTES
"Occupational Therapy Treatment  Patient Name: Arin Phan  MRN: [id number]  OT Received on: 5/14/2025 OT Received On: 05/14/25  Time Calculation  Start Time: 1035  Stop Time: 1128  Time Calculation (min): 53 min  OT Therapeutic Procedures Time Entry  Therapeutic Exercise Time Entry: 43  Non-Billable Time  Non-billable time: 10  Non-billable time reason: 5 heat, 5 ice.  Insurance:  Visit Number: 4 of 20    4/28/25 - OT AUTH RECEIVED FOR 20 VISITS (80 UNITS) FOR [id number], 55401, 18111, [id number], 09229 FROM 4/28/25 TO 8/29/25 AUTH # [id number]  JS  4/25/25 - [employer] / EVAL ONLY / 20 VISIT LIMIT YEAR - 0 USED 4/25/25 / $10 CO PAY / OOP 3050 NOT MET / C/S W/S & AVAILITY # [id number]    Subjective   Problem List Items Addressed This Visit           ICD-10-CM       Musculoskeletal and Injuries    Complete tear of right rotator cuff M75.121       1. Complete tear of right rotator cuff, unspecified whether traumatic  Referral to Occupational Therapy            History of Present Illness  Date of onset: 1/8/2025  Date of surgery: 4/23/2025  Mechanism of injury: Patient is a 59-year-old female who sustained a fall on her steps in January resulting in R rotator tear. Patient underwent #1 right shoulder arthroscopic double row rotator cuff repair  #2 right shoulder arthroscopic biceps tenodesis  #3 right shoulder arthroscopic major joint debridement of labral tear biceps tear subscapular fraying with significant joint synovitis  #4 right shoulder arthroscopic anterior acromioplasty extensive decompression and bursectomy for severe subacromial bursitis with impingement lesion        Referred by: Dr. Vegas, next follow up  6/2/25    Patient reports \"it is a little sore today. The right side of my neck is hurting me too.  I wore my sling at work to help out on mothers day. I only wrote out gift cards and did some paperwork. I did do some writing and a little bit of computer work. \" , pt now using heat and ice once a " "day. Using oxy  once at bedtime  and ibupfrofen the last 7 of 7( was 3 of 7)  \"night time mostly.\" Wearing sling at all times when up and moving and sleeping   Goals and precautions reviewed with patient. She verbalized understanding.    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     Precautions:   0-2 weeks: AAROM R elbow/FA  5/7/25  0-6 weeks: PROM R shoulder -current   6-12 weeks: AROM R shoulder 6/4/25  12+ weeks: Strengthening 7/16/25    Performing HEP?: Yes    Pain: 3/10 in front of joint capsule \" biting\" only with movement\".   Pain Assessment  Pain Assessment: 0-10  0-10 (Numeric) Pain Score: 3  Pain Type: Acute pain, Surgical pain  Pain Location: Shoulder  Pain Orientation: Right, Upper  Pain Descriptors: Sharp, Other (Comment), Sore (biting)  Pain Frequency: Intermittent  Objective   Upper Extremity ROM                AROM PROM MMT MMT       Right Left Right Left Right Left   SHOULDER Flexion NT  ( was 143) WFL NT WFL     Extension NT WFL Neutral  WFL NT WFL     Abduction NT  ( was 122) WFL NT WFL     Internal Rotation NT WFL Neutral  WFL NT WFL     External Rotation NT WFL 75( was 70)  WFL NT WFL   AAROM   ELBOW Extension -20 WFL 0 WFL NT WFL     Flexion  120  ( was 130)  WFL NT WFL   AAROM  FOREARM Pronation 90 WFL 90( same)  WFL NT WFL     Supination 60 WFL 75( same )  WFL NT WFL      WRIST Flexion WFL WFL WFL WFL NT WFL     Extension WFL WFL WFL WFL NT WFL     Radial Deviation WFL WFL WFL WFL         Ulnar Deviation WFL WFL WFL WFL       Physical Observation: healing incisions, continued light scabbing . Pt stated she touches them. No excessive redness or drainage.   Edema: none     Sensory: impaired  Numbness/Tingling: noted in hand and fingers \" it has always been there and it comes and goes. \"     Treatment:  Reviewed scar massage. To complete on closed areas of incisions.     Modalities:  Moist heat applied x 5 min at start of session during goal review to promote muscle " movement during session  Ice applied x 5 min at end of session during wrap up to reduce edema and inflammation.   Reviewed benefit of biofreeze use for pain relief when sites are no longer scabbed.             Therapeutic Exercise:  AROM L hand composite flexion/extension, opposition, wrist flexion/extension, RD/UD,  FA sup/pro and elbow flexion/extension x10 reps each  scapular mobilization including elevation/depression, protraction/retraction, and CW/CCW circumduction x15 ( was 10 )reps each   forward/backward, side-to-side, and CW/CCW pendulums x 15 ( was 12)  reps with good form    Counter top walk aways x 12 ( was 10)  reps with 5 sec hold at end range  for PROM shoulder flexion and abduction  Writer performed  gentle  supine PROM of supine shoulder flexion, abduction, pt performed ER with dowel matthew x 10 ( was  8 )reps with hold at end range and good tolerance by patient.   Post-tx pain: 4/10, increased  since start of session    Assessment/Plan Pt motivated to return to function.  Noted increased PROM in shoulder today.   Pt to  continue to increase modality use, continue sling wear for 3 weeks. To initiate scar massage when incisions are  no longer scabbed.   Pt to continue with HEP , increasing reps to 15-18 as tolerated for pain. Plan to progress patient according to protocol and as tolerated for pain.   OP EDUCATION:  Education  Individual(s) Educated: Patient  Education Provided: Diagnosis & Precautions, Orthotics wearing schedule and precautions, Edema control, Wound care, POC discussed and agreed upon, Risk and benefits of OT discussed with patient or other, Joint protection and energy conservation, Ergonomics and postural realignment (POst-op protocol)  Home Program: PROM, AAROM, Modalities, Orthotic wearing schedule, care and precautions, Handout issued, Wound/scar care, Edema control, Activity modification  Equipment: Other (dowel matthew)  Risk and Benefits Discussed with Patient/Caregiver/Other:  yes  Patient/Caregiver Demonstrated Understanding: yes  Plan of Care Discussed and Agreed Upon: yes  Patient Response to Education: Patient/Caregiver Verbalized Understanding of Information, Patient/Caregiver Performed Return Demonstration of Exercises/Activities, Patient/Caregiver Asked Appropriate Questions    Goals:  Active       OT Goals       1. Patient will increase R shoulder PROM flexion and abduction to 130 degrees for improved ease of ADL's. (Met)       Start:  04/25/25    Expected End:  06/06/25    Resolved:  04/30/25         2. Patient will increase R shoulder PROM ER to 60 degrees for increased ease of ADL's. (Met)       Start:  04/25/25    Expected End:  06/06/25    Resolved:  04/30/25         3. Patient will increase R shoulder AROM flexion and abduction to 130 degrees for improved ease of ADL's. (Progressing)       Start:  04/25/25    Expected End:  08/15/25            4. Patient will increase R shoulder AROM ER to 60 degrees for increased ease of ADL's. (Progressing)       Start:  04/25/25    Expected End:  08/15/25            5. 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 Patient will increase overall functional ease and independence AEB UEFI score of at least 65/80 for improved quality of life for patient. (Progressing)       Start:  04/25/25    Expected End:  08/15/25

## 2025-05-14 NOTE — PROGRESS NOTES
Clinical Pharmacy Appointment    Patient ID: Arin Phan is a 59 y.o. female who presents for Obesity.    Pt is here for Follow Up appointment.     Referring Provider/PCP: Dariana Godoy MD  Last visit with PCP: 3/25/25   Next visit with PCP: Not yet scheduled      Subjective     HPI  WEIGHT LOSS  BMI Readings from Last 3 Encounters:   04/23/25 37.44 kg/m²   04/15/25 38.07 kg/m²   04/08/25 37.80 kg/m²      Starting weight: 231 lbs. (12/10)  Current weight: 218 lbs. No scale, Pants are baggier    Lifestyle (Low appetite)  Diet: 2 meals/day.  BK: Eggs, turkey sausage  LN: Usually skips  DN: Chicken, eggs, burgers, corn/peas  Snacks: Ritz peanut butter crackers, chips occasionally   Drinks: Crystal Light water  Has worked with nutritionist/dietician? Yes,    Physical Activity: On feet all day (), starting to use treadmill    Other Potential Contributing Factors  Alcohol use: Average 0 drinks/week  Tobacco use: No  Other drug use: No  Medications that may cause weight gain: none  Mental health: No current concerns  Eating Disorders: Denies Hx of any eating disorder  Comorbidities: Comorbidities: sleep apnea not using an appliance  (Had one, but stopped using)      Non-Pharmacological Therapy  Weight loss techniques attempted:  Self-directed dieting: Weight watchers in past    Pharmacological Therapy  Current Medications: Zepbound 7.5 mg on Sundays  Adverse Effects: None  Previous Medications: Ozempic- fatigue, mental fog (through a med spa)    Insurance coverage of weight-loss medications? No,    Eligible for copay cards/programs? Yes,    Eligible for  PAP? Yes, reports income <400% FPL    Medication Estimated Cost Expected weight loss Patient Risks/Cautions Notes   Wegovy (semaglutide) ~$650/month w/ savings card 10-20% None      Zepbound (tirzepatide) $650/month   w/ savings card 10-20%     Qsymia (phentermine-topiramate) $98/month via [facility] 5-10% None Controlled substance   Contrave  (bupropion-naltrexone) $99/month   via [employer] 5-10% None    Adipex (phentermine) ~$15/month 3-5% None Controlled substance,  Short-term use only   Renato (OTC Orlistat) ~$60/month 3-5%  Adverse effects likely outweigh benefit.         Medication Reconciliation:  Changed:   Added:   Discontinued:     Drug Interactions  No relevant drug interactions were noted.    Medication System Management  Patients preferred pharmacy: [facility]  Adherence/Organization: No issues reported  Affordability/Accessibility: Zepbound through [facility] through 12/31/25      Objective   Allergies   Allergen Reactions    House Dust Mite Other     congested, stuffy    Mold Other     congested, stuffy    Pollen Extracts Unknown     Social History     Social History Narrative    Not on file      Medication Review  Current Outpatient Medications   Medication Instructions    acetaminophen (TYLENOL) 650 mg, Every 8 hours PRN    albuterol (Ventolin HFA) 90 mcg/actuation inhaler 2 puffs, Every 6 hours PRN    ASPIRIN-ACETAMINOPHEN-CAFFEINE ORAL 2 tablets, 2 times daily PRN    calcium citrate-vitamin D3 500 mg-12.5 mcg (500 unit) tablet,chewable 1 tablet, 2 times daily    cyanocobalamin (Vitamin B-12) 100 mcg tablet 1 tablet, Every morning    DULoxetine (Cymbalta) 30 mg DR capsule Take 1 capsule by mouth daily x 2 weeks, then increase to 2 capsules (60mg) by mouth daily. Do not crush or chew.    DULoxetine (CYMBALTA) 60 mg, oral, Daily, To start after completing the 30mg capsules (30mg x 2 weeks, 60mg daily afterwards) Do not crush or chew.    minoxidil (LONITEN) 1.25 mg, oral, Daily    multivit-minerals/folic acid (ADULT ONE DAILY MULTIVITAMIN ORAL) Every morning    traZODone (Desyrel) 50 mg tablet Take half to one tablet by mouth about 30 minutes before bedtime as needed for sleep    Zepbound 7.5 mg, subcutaneous, Every 7 days      Vitals  BP Readings from Last 2 Encounters:   04/23/25 118/86   04/15/25 101/70     BMI Readings from Last 1  "Encounters:   04/23/25 37.44 kg/m²      Labs  A1C  Lab Results   Component Value Date    HGBA1C 5.4 02/17/2025    HGBA1C 5.7 (H) 10/23/2024    HGBA1C 5.7 03/28/2018     BMP  Lab Results   Component Value Date    CALCIUM 9.3 02/17/2025     02/17/2025    K 4.3 02/17/2025    CO2 29 02/17/2025     02/17/2025    BUN 19 02/17/2025    CREATININE 0.65 02/17/2025    EGFR 102 02/17/2025     LFTs  Lab Results   Component Value Date    ALT 20 02/17/2025    AST 24 02/17/2025    ALKPHOS 80 02/17/2025    BILITOT 0.3 02/17/2025     FLP  Lab Results   Component Value Date    TRIG 61 02/17/2025    CHOL 197 02/17/2025    LDLCALC 106 (H) 02/17/2025    HDL 76 02/17/2025     Urine Microalbumin  No results found for: \"MICROALBCREA\"  Weight Management  Wt Readings from Last 3 Encounters:   04/23/25 99 kg (218 lb 4.1 oz)   04/15/25 101 kg (221 lb 12.5 oz)   04/08/25 99.9 kg (220 lb 3.8 oz)      There is no height or weight on file to calculate BMI.     Assessment/Plan   Problem List Items Addressed This Visit       BMI 38.0-38.9,adult       Current regimen includes Zepbound. Patient's last weight reported as 218. Has lost at least 13 lbs (5.6% of TBW) since starting therapy plan. Due to tolerance of medication, continue dose.     Medication Changes:  CONTINUE  Zepbound 7.5 mg once weekly         Relevant Orders    Referral to Clinical Pharmacy     Other Visit Diagnoses         Weight loss advised        Relevant Orders    Referral to Clinical Pharmacy      Prediabetes        Relevant Orders    Referral to Clinical Pharmacy      Class 3 severe obesity due to excess calories with serious comorbidity and body mass index (BMI) of 40.0 to 44.9 in adult        Relevant Orders    Referral to Clinical Pharmacy              Clinical Pharmacist follow-up: 6/11, Telehealth visit    Continue all meds under the continuation of care with the referring provider and clinical pharmacy team.    Thank you,  Bella Prieto, PharmD, BCPS  Clinical " Pharmacy Specialist  [phone]    Verbal consent to manage patient's drug therapy was obtained from the patient. They were informed they may decline to participate or withdraw from participation in pharmacy services at any time.

## 2025-05-14 NOTE — PROGRESS NOTES
PRIMARY CARE PHYSICIAN: Dariana Godoy MD  REFERRING PROVIDER: No referring provider defined for this encounter.     CONSULT ORTHOPAEDIC: Knee Evaluation        ASSESSMENT & PLAN    IMPRESSION:   Primary osteoarthritis, right knee    PLAN:   Discussed the patient diagnosis above.  Reviewed her current x-rays with her.  She does have severe arthritis and most definitively would benefit from a knee replacement at this point is not ready for surgical intervention and wants to try conservative treatment while she recovers from her rotator cuff surgery and consider surgery next year.  Would like to proceed with a corticosteroid injection.  Discussed she can repeat this every 3 to 4 months.  Risk and benefits discussed, postinjection instructions verbally given.    L Inj/Asp: R knee on 5/15/2025 1:17 PM  Indications: pain  Details: 25 G needle, anterolateral (Inferolateral) approach  Medications: 40 mg triamcinolone acetonide 40 mg/mL; 4 mL lidocaine 10 mg/mL (1 %)  Outcome: tolerated well, no immediate complications    Prepped with alcohol, bandaid applied to injection site.   Procedure, treatment alternatives, risks and benefits explained, specific risks discussed. Consent was given by the patient.             SUBJECTIVE  CHIEF COMPLAINT:   Chief Complaint   Patient presents with    Right Knee - Pain        HPI: Arin Phan is a 59 y.o. patient. Arin Phan has had progressive problems with their right knee over the past several years. They do not report any history of trauma to the area(s). She states that she had a previous meniscus repair that was done about 8 years ago. At the time, she only had arthritis on the medial side of her knee. They deny having any numbness and tingling in their legs*. Their symptoms that are interfering with their daily life include  standing on her feet for a long period of time. She is a  and states by the end of the day, she is walking peg legged. Currently not  working as she had RTC surgery with Dr Vegas recently. Planning on going back to work beginning of August.     FUNCTIONAL STATUS: occasionally limited.  AMBULATORY STATUS: Independent  PREVIOUS TREATMENTS: NSAIDS as needed with mild improvement  Cortisone injection last done 7/2024 with no improvement  HISTORY OF SURGERY ON AFFECTED KNEE(S): Yes       REVIEW OF SYSTEMS  Constitutional: See HPI for pain assessment, No significant weight loss, recent trauma  Cardiovascular: No chest pain, shortness of breath  Respiratory: No difficulty breathing, cough  Gastrointestinal: No nausea, vomiting, diarrhea, constipation  Musculoskeletal: Noted in HPI, positive for pain, restricted motion, stiffness  Integumentary: No rashes, easy bruising, redness   Neurological: no numbness or tingling in extremities, no gait disturbances   Psychiatric: No mood changes, memory changes, social issues  Heme/Lymph: no excessive swelling, easy bruising, excessive bleeding  ENT: No hearing changes  Eyes: No vision changes    Medical History[1]     Allergies[2]     Surgical History[3]     Family History[4]     Social History     Socioeconomic History    Marital status: Single     Spouse name: Not on file    Number of children: Not on file    Years of education: Not on file    Highest education level: Not on file   Occupational History    Not on file   Tobacco Use    Smoking status: Never     Passive exposure: Never    Smokeless tobacco: Never   Vaping Use    Vaping status: Never Used   Substance and Sexual Activity    Alcohol use: Not Currently     Comment: rare    Drug use: Never    Sexual activity: Never   Other Topics Concern    Not on file   Social History Narrative    Not on file     Social Drivers of Health     Financial Resource Strain: Not on file   Food Insecurity: Not on file   Transportation Needs: Not on file   Physical Activity: Not on file   Stress: Not on file   Social Connections: Not on file   Intimate Partner Violence: Not on  "file   Housing Stability: Not on file        CURRENT MEDICATIONS:   Current Medications[5]     OBJECTIVE    PHYSICAL EXAM      4/23/2025    11:50 AM 4/23/2025    11:55 AM 4/23/2025    12:00 PM 4/23/2025    12:05 PM 4/23/2025    12:13 PM 4/23/2025    12:54 PM 5/15/2025    12:15 PM   Vitals   Systolic   125  114 118    Diastolic   82  82 86    BP Location Left arm Left arm Left arm       Heart Rate 76 83 79 79 83 79    Temp   36.7 °C (98.1 °F)  36.7 °C (98.1 °F) 36.4 °C (97.5 °F)    Resp 14 13 18 14 16 16    Height       1.626 m (5' 4.02\")   Weight (lb)       215.4   BMI       36.95 kg/m2   BSA (m2)       2.1 m2   Visit Report       Report      Body mass index is 36.95 kg/m².    GENERAL: A/Ox3, NAD. Appears healthy, well nourished  PSYCHIATRIC: Mood stable, appropriate memory recall  EYES: EOM intact, no scleral icterus  CARDIAC: regular rate  LUNGS: Breathing non-labored  SKIN: no erythema, rashes, or ecchymoses     MUSCULOSKELETAL:  Laterality: right Knee Exam  - Alignment: partially correctible varus deformity  - ROM:  5 - 120 deg   - Effusion: none  - Strength: knee extension and flexion 5/5, EHL/PF/DF motor intact  - Palpation: TTP along medial and lateral joint line  - Stability: Anterior/Posterior stable, varus/valgus stable  - Gait: normal  - Hip Exam: flexion to 100+ degrees, full extension, internal/external rotation adequate, and no pain with log roll  - Special Tests: none performed    NEUROVASCULAR:  - Neurovascular Status: sensation intact to light touch distally  - Capillary refill brisk at extremities, Bilateral dorsalis pedis pulse 2+     IMAGING:  Multiple views of the affected right knee(s) demonstrate: Severe medial, arthritis with complete joint space narrowing, subchondral sclerosis, marginal osteophytic change and varus subluxation.   X-rays were personally reviewed and interpreted by me.  Radiology reports were reviewed by me as well, if readily available at the time.        An Mack, " DO  Attending Surgeon  [facility]  [place], [place]          [1]   Past Medical History:  Diagnosis Date    Allergic     Anxiety     Arthritis     Asthma     Cluster headache ?    Depression     Joint pain ?    Low back pain ?    Migraine ?    Neck pain ?    Obesity     Osteoarthritis ?    Sleep apnea     no longer uses CPAP after bariatric surgery    Spinal stenosis ?   [2]   Allergies  Allergen Reactions    House Dust Mite Other     congested, stuffy    Mold Other     congested, stuffy    Pollen Extracts Unknown   [3]   Past Surgical History:  Procedure Laterality Date    BARIATRIC SURGERY      BUNIONECTOMY      CHOLECYSTECTOMY      COLONOSCOPY      EPIDURAL BLOCK INJECTION  ?    KNEE CARTILAGE SURGERY Right     UPPER GASTROINTESTINAL ENDOSCOPY     [4]   Family History  Problem Relation Name Age of Onset    Heart disease Mother 81     Coronary artery disease Mother 81     Other (s/p cabg) Mother 81     Hypertension Father 81     Obesity Father 81     Atrial fibrillation Father 81     Heart failure Father 81     Other (CRF) Father 81     Arthritis Father 81     Alcohol abuse Father 81     Other (CVA) Brother 59     Stroke Brother 59     Breast cancer Maternal Grandmother Luci jake     Migraines Maternal Grandmother Luci joseph     Breast cancer Paternal Grandmother     [5]   Current Outpatient Medications   Medication Sig Dispense Refill    acetaminophen (TylenoL) 325 mg tablet Take 2 tablets (650 mg) by mouth every 8 hours if needed for moderate pain (4 - 6) or mild pain (1 - 3).      albuterol (Ventolin HFA) 90 mcg/actuation inhaler Inhale 2 puffs every 6 hours if needed for shortness of breath or wheezing.      ASPIRIN-ACETAMINOPHEN-CAFFEINE ORAL Take 2 tablets by mouth 2 times a day as needed for headaches.      calcium citrate-vitamin D3 500 mg-12.5 mcg (500 unit) tablet,chewable Chew 1 tablet 2 times a day.      cyanocobalamin (Vitamin B-12)  100 mcg tablet Take 1 tablet (100 mcg) by mouth once daily in the morning.      DULoxetine (Cymbalta) 30 mg DR capsule Take 1 capsule by mouth daily x 2 weeks, then increase to 2 capsules (60mg) by mouth daily. Do not crush or chew. 30 capsule 5    DULoxetine (Cymbalta) 60 mg DR capsule Take 1 capsule (60 mg) by mouth once daily. To start after completing the 30mg capsules (30mg x 2 weeks, 60mg daily afterwards) Do not crush or chew. 90 capsule 3    minoxidil (Loniten) 2.5 mg tablet Take 0.5 tablets (1.25 mg) by mouth once daily. 15 tablet 4    multivit-minerals/folic acid (ADULT ONE DAILY MULTIVITAMIN ORAL) Take by mouth once daily in the morning. As directed      tirzepatide, weight loss, (Zepbound) 7.5 mg/0.5 mL injection Inject 7.5 mg under the skin every 7 days. 2 mL 11    traZODone (Desyrel) 50 mg tablet Take half to one tablet by mouth about 30 minutes before bedtime as needed for sleep 90 tablet 3     No current facility-administered medications for this visit.

## 2025-05-14 NOTE — ASSESSMENT & PLAN NOTE
Current regimen includes Zepbound. Patient's last weight reported as 218. Has lost at least 13 lbs (5.6% of TBW) since starting therapy plan. Due to tolerance of medication, continue dose.     Medication Changes:  CONTINUE  Zepbound 7.5 mg once weekly

## 2025-05-14 NOTE — Clinical Note
Patient doing well on Zepbound 7.5 mg. No side effects. Scale  at home, so no recent weight, but noticing pants are baggier. Will continue on this dose.

## 2025-05-15 ENCOUNTER — OFFICE VISIT (OUTPATIENT)
Dept: ORTHOPEDIC SURGERY | Facility: HOSPITAL | Age: 60
End: 2025-05-15
Payer: MEDICARE

## 2025-05-15 ENCOUNTER — HOSPITAL ENCOUNTER (OUTPATIENT)
Dept: RADIOLOGY | Facility: HOSPITAL | Age: 60
Discharge: HOME | End: 2025-05-15
Payer: MEDICARE

## 2025-05-15 ENCOUNTER — PHARMACY VISIT (OUTPATIENT)
Dept: PHARMACY | Facility: CLINIC | Age: 60
End: 2025-05-15
Payer: COMMERCIAL

## 2025-05-15 VITALS — BODY MASS INDEX: 36.77 KG/M2 | WEIGHT: 215.4 LBS | HEIGHT: 64 IN

## 2025-05-15 DIAGNOSIS — M25.561 BILATERAL CHRONIC KNEE PAIN: ICD-10-CM

## 2025-05-15 DIAGNOSIS — M17.11 PRIMARY OSTEOARTHRITIS OF RIGHT KNEE: Primary | ICD-10-CM

## 2025-05-15 DIAGNOSIS — M25.562 BILATERAL CHRONIC KNEE PAIN: ICD-10-CM

## 2025-05-15 DIAGNOSIS — G89.29 BILATERAL CHRONIC KNEE PAIN: ICD-10-CM

## 2025-05-15 PROCEDURE — 2500000004 HC RX 250 GENERAL PHARMACY W/ HCPCS (ALT 636 FOR OP/ED): Performed by: ORTHOPAEDIC SURGERY

## 2025-05-15 PROCEDURE — 20610 DRAIN/INJ JOINT/BURSA W/O US: CPT | Mod: RT | Performed by: ORTHOPAEDIC SURGERY

## 2025-05-15 PROCEDURE — 99213 OFFICE O/P EST LOW 20 MIN: CPT | Mod: 25 | Performed by: ORTHOPAEDIC SURGERY

## 2025-05-15 PROCEDURE — 73564 X-RAY EXAM KNEE 4 OR MORE: CPT | Mod: 50

## 2025-05-15 RX ORDER — LIDOCAINE HYDROCHLORIDE 10 MG/ML
4 INJECTION, SOLUTION INFILTRATION; PERINEURAL
Status: COMPLETED | OUTPATIENT
Start: 2025-05-15 | End: 2025-05-15

## 2025-05-15 RX ORDER — TRIAMCINOLONE ACETONIDE 40 MG/ML
40 INJECTION, SUSPENSION INTRA-ARTICULAR; INTRAMUSCULAR
Status: COMPLETED | OUTPATIENT
Start: 2025-05-15 | End: 2025-05-15

## 2025-05-15 RX ADMIN — LIDOCAINE HYDROCHLORIDE 4 ML: 10 INJECTION, SOLUTION INFILTRATION; PERINEURAL at 13:17

## 2025-05-15 RX ADMIN — TRIAMCINOLONE ACETONIDE 40 MG: 40 INJECTION, SUSPENSION INTRA-ARTICULAR; INTRAMUSCULAR at 13:17

## 2025-05-21 ENCOUNTER — TREATMENT (OUTPATIENT)
Dept: OCCUPATIONAL THERAPY | Facility: CLINIC | Age: 60
End: 2025-05-21
Payer: MEDICARE

## 2025-05-21 DIAGNOSIS — M75.121 COMPLETE TEAR OF RIGHT ROTATOR CUFF, UNSPECIFIED WHETHER TRAUMATIC: ICD-10-CM

## 2025-05-21 PROCEDURE — 97110 THERAPEUTIC EXERCISES: CPT | Mod: GO | Performed by: OCCUPATIONAL THERAPIST

## 2025-05-21 PROCEDURE — 97140 MANUAL THERAPY 1/> REGIONS: CPT | Mod: GO | Performed by: OCCUPATIONAL THERAPIST

## 2025-05-21 ASSESSMENT — PAIN SCALES - GENERAL: PAINLEVEL_OUTOF10: 3

## 2025-05-21 ASSESSMENT — PAIN - FUNCTIONAL ASSESSMENT: PAIN_FUNCTIONAL_ASSESSMENT: 0-10

## 2025-05-21 NOTE — PROGRESS NOTES
"Occupational Therapy Treatment  Patient Name: Arin Phan  MRN: [id number]  OT Received on: 5/21/2025        Time Calculation  Start Time: 1000  Stop Time: 1045  Time Calculation (min): 45 min  OT Therapeutic Procedures Time Entry  Manual Therapy Time Entry: 8  Therapeutic Exercise Time Entry: 37    Insurance:  Visit Number: 5 of 20  Authorization #: [id number]  Authorization Dates: 4/28/25-8/29/25  Approved Codes: 43027, 50542, 23177, 13996, 42125  Insurance Type: [facility]      Subjective   Problem List Items Addressed This Visit           ICD-10-CM    Complete tear of right rotator cuff M75.121     Current Problem/Reason for visit:  S/p R RTC repair on 4/23/25    Referred by: Dr. Vegas    Patient reports \"My shoulder has been bothering me a little bit more lately, it is better today than it has been.\"    Precautions:   0-2 weeks: AAROM R elbow/FA  0-6 weeks: PROM R shoulder  6-12 weeks: AROM R shoulder  12+ weeks: Strengthening    Performing HEP?: Yes    Pain:  Pain Assessment  Pain Assessment: 0-10  0-10 (Numeric) Pain Score: 3  Pain Location: Shoulder  Pain Orientation: Right    Objective   4 weeks post-op.    Advil PRN for pain.    Physical Observation: Patient into clinic with sling donned  Edema: Mild RUE proximally  Sensory: Intact   Numbness/Tingling: N/a      Treatment:    Manual: Therapist implemented soft tissue mobilization and trigger point in conjunction with BioFreeze along medial border of scapula, upper trap, and anterior aspect of shoulder capsule.    Therapeutic Exercise:  AROM R FA sup/pro, R elbow flexion/extension with FA neutral, supinated, pronated x20 reps each.  Scapular mobilization shoulder elevation/depression, protraction/retraction, CW/CCW circumduction x20 reps each.  Pendulums forward/backward, side-to-side, CW/CCW x20 reps each  Countertop leans x15 reps with prolonged hold.  Supine dowel matthew for ER x15 reps.  Therapist performed PROM to R shoulder flexion flexion, " abduction, scaption, ER, and IR x10 reps each with prolonged hold.    Post-tx pain: 0/10    Assessment/Plan Patient is progressing as expected; PROM as visualized this date is excellent; will continue to advance intervention as indicated/tolerated.      OP EDUCATION:  Education  Individual(s) Educated: Patient  Home Program: PROM, AROM, Modalities, Orthotic wearing schedule, care and precautions    Goals:  Active       OT Goals       1. Patient will increase R shoulder PROM flexion and abduction to 130 degrees for improved ease of ADL's. (Met)       Start:  04/25/25    Expected End:  06/06/25    Resolved:  04/30/25         2. Patient will increase R shoulder PROM ER to 60 degrees for increased ease of ADL's. (Met)       Start:  04/25/25    Expected End:  06/06/25    Resolved:  04/30/25         3. Patient will increase R shoulder AROM flexion and abduction to 130 degrees for improved ease of ADL's. (Progressing)       Start:  04/25/25    Expected End:  08/15/25            4. Patient will increase R shoulder AROM ER to 60 degrees for increased ease of ADL's. (Progressing)       Start:  04/25/25    Expected End:  08/15/25            5. 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 Patient will increase overall functional ease and independence AEB UEFI score of at least 65/80 for improved quality of life for patient. (Progressing)       Start:  04/25/25    Expected End:  08/15/25

## 2025-05-28 ENCOUNTER — TREATMENT (OUTPATIENT)
Dept: OCCUPATIONAL THERAPY | Facility: CLINIC | Age: 60
End: 2025-05-28
Payer: MEDICARE

## 2025-05-28 DIAGNOSIS — M75.121 COMPLETE TEAR OF RIGHT ROTATOR CUFF, UNSPECIFIED WHETHER TRAUMATIC: ICD-10-CM

## 2025-05-28 PROCEDURE — 97110 THERAPEUTIC EXERCISES: CPT | Mod: GO,CO

## 2025-05-28 ASSESSMENT — PAIN DESCRIPTION - DESCRIPTORS: DESCRIPTORS: OTHER (COMMENT);SHARP

## 2025-05-28 ASSESSMENT — PAIN - FUNCTIONAL ASSESSMENT: PAIN_FUNCTIONAL_ASSESSMENT: 0-10

## 2025-05-28 ASSESSMENT — PAIN SCALES - GENERAL: PAINLEVEL_OUTOF10: 4

## 2025-06-02 ENCOUNTER — APPOINTMENT (OUTPATIENT)
Dept: ORTHOPEDIC SURGERY | Facility: CLINIC | Age: 60
End: 2025-06-02
Payer: MEDICARE

## 2025-06-02 DIAGNOSIS — M75.101 TEAR OF RIGHT ROTATOR CUFF, UNSPECIFIED TEAR EXTENT, UNSPECIFIED WHETHER TRAUMATIC: Primary | ICD-10-CM

## 2025-06-02 PROCEDURE — 1036F TOBACCO NON-USER: CPT | Performed by: PHYSICIAN ASSISTANT

## 2025-06-02 PROCEDURE — 99024 POSTOP FOLLOW-UP VISIT: CPT | Performed by: PHYSICIAN ASSISTANT

## 2025-06-02 ASSESSMENT — COLUMBIA-SUICIDE SEVERITY RATING SCALE - C-SSRS
2. HAVE YOU ACTUALLY HAD ANY THOUGHTS OF KILLING YOURSELF?: NO
1. IN THE PAST MONTH, HAVE YOU WISHED YOU WERE DEAD OR WISHED YOU COULD GO TO SLEEP AND NOT WAKE UP?: NO
6. HAVE YOU EVER DONE ANYTHING, STARTED TO DO ANYTHING, OR PREPARED TO DO ANYTHING TO END YOUR LIFE?: NO

## 2025-06-02 ASSESSMENT — PAIN - FUNCTIONAL ASSESSMENT: PAIN_FUNCTIONAL_ASSESSMENT: 0-10

## 2025-06-02 ASSESSMENT — PAIN SCALES - GENERAL: PAINLEVEL_OUTOF10: 3

## 2025-06-02 NOTE — PROGRESS NOTES
Reason for Appointment  Chief Complaint   Patient presents with    Right Shoulder - Follow-up     History of Present Illness  Patient is here today 6 weeks status post right shoulder arthroscopic rotator cuff repair and biceps tenodesis.  She is doing well, is progressing to active shoulder motion.  She already has over 130 degrees of active forward flexion today and she does understand being careful and not pushing this shoulder too much.  She understands no heavy lifting or strengthening at this point.  Wounds are fully healed with no signs of infection.  We will follow-up with her in 2 months.    Assessment   Right rotator cuff tear

## 2025-06-04 ENCOUNTER — TREATMENT (OUTPATIENT)
Facility: CLINIC | Age: 60
End: 2025-06-04
Payer: MEDICARE

## 2025-06-04 DIAGNOSIS — M75.121 COMPLETE TEAR OF RIGHT ROTATOR CUFF, UNSPECIFIED WHETHER TRAUMATIC: ICD-10-CM

## 2025-06-04 PROCEDURE — 97110 THERAPEUTIC EXERCISES: CPT | Mod: GO,CO

## 2025-06-04 ASSESSMENT — PAIN SCALES - GENERAL: PAINLEVEL_OUTOF10: 3

## 2025-06-04 ASSESSMENT — PAIN DESCRIPTION - DESCRIPTORS: DESCRIPTORS: RADIATING;OTHER (COMMENT)

## 2025-06-04 ASSESSMENT — PAIN - FUNCTIONAL ASSESSMENT: PAIN_FUNCTIONAL_ASSESSMENT: 0-10

## 2025-06-04 NOTE — PROGRESS NOTES
"-Occupational Therapy Treatment  Patient Name: Arin Phan  MRN: [id number]  OT Received on: 6/4/2025 OT Received On: 06/04/25  Time Calculation  Start Time: 1013  Stop Time: 1111  Time Calculation (min): 58 min  OT Therapeutic Procedures Time Entry  Therapeutic Exercise Time Entry: 58  Insurance:  Visit Number: 7 of 20 4/28/25 - OT AUTH RECEIVED FOR 20 VISITS (80 UNITS) FOR [id number], [id number], [id number], [id number], [id number] FROM 4/28/25 TO 8/29/25 AUTH # [id number]    4/25/25 - [employer] / EVAL ONLY / 20 VISIT LIMIT YEAR - 0 USED 4/25/25 / $10 CO PAY / OOP 3050 NOT MET / C/S W/S & AVAILITY # [id number]    Subjective   Problem List Items Addressed This Visit           ICD-10-CM       Musculoskeletal and Injuries    Complete tear of right rotator cuff M75.121       1. Complete tear of right rotator cuff, unspecified whether traumatic  Referral to Occupational Therapy            History of Present Illness  Date of onset: 1/8/2025  Date of surgery: 4/23/2025  Mechanism of injury: Patient is a 59-year-old female who sustained a fall on her steps in January resulting in R rotator tear. Patient underwent #1 right shoulder arthroscopic double row rotator cuff repair  #2 right shoulder arthroscopic biceps tenodesis  #3 right shoulder arthroscopic major joint debridement of labral tear biceps tear subscapular fraying with significant joint synovitis  #4 right shoulder arthroscopic anterior acromioplasty extensive decompression and bursectomy for severe subacromial bursitis with impingement lesion        Referred by: Dr. Vegas, next follow up  8/4/25 no more sling wear, AROM today.     Patient reports \" my neck is pretty sore \". pt now using heat and ice once a day.  No longer wearing sling . No more oxy at bedtime  and ibupfrofen the last  4 of 7( was 1  of 7)  \"night time mostly.\"  Goals and precautions reviewed with patient. She verbalized understanding.   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     Precautions: " "  0-2 weeks: AAROM R elbow/FA  5/7/25  0-6 weeks: PROM R shoulder   6-12 weeks: AROM R shoulder 6/4/25- current  12+ weeks: Strengthening 7/16/25    Performing HEP?: Yes    Pain: 3/10 in top of capsule , radiating up R side of neck.  \" biting\" only with movement\".   0/10 rest   Pain Assessment  Pain Assessment: 0-10  0-10 (Numeric) Pain Score: 3 (0/10 with rest)  Pain Type: Acute pain, Surgical pain  Pain Location: Shoulder  Pain Orientation: Right, Upper  Pain Descriptors: Radiating, Other (Comment) (biting)  Pain Frequency: Intermittent  Objective   UEFI completed with patient at 33/80 ( was 7/80 at [facility])  Upper Extremity ROM                AAROM  ( Dowel)  PROM MMT MMT       Right Left Right Left Right Left   SHOULDER Flexion 165( new)  WFL NT WFL     Extension 52 ( new) WFL Neutral  WFL NT WFL     Abduction 110( new)    WFL NT WFL     Internal Rotation To stomach WFL Neutral  WFL NT WFL     External Rotation NT WFL 75 ( same) WFL NT WFL   AAROM   ELBOW Extension -20 WFL 0 WFL NT WFL     Flexion  120   WFL NT WFL   AAROM  FOREARM Pronation 90 WFL 90 WFL NT WFL     Supination 60 WFL 90   WFL NT WFL      WRIST Flexion WFL WFL WFL WFL NT WFL     Extension WFL WFL WFL WFL NT WFL     Radial Deviation WFL WFL WFL WFL         Ulnar Deviation WFL WFL WFL WFL       Physical Observation: healing incisions, continued light scabbing. No excessive redness or drainage.   Edema: none   Sensory: impaired  Numbness/Tingling:  continues to be noted in \"hand and fingers, every once in while\".     Treatment:  Reviewed scar massage. To complete on closed areas of incisions.    Modalities:  Moist heat applied x 5 min at start of session during goal review to promote muscle movement during session  Ice applied x 5 min at end of session during wrap up to reduce edema and inflammation.   Reviewed benefit of biofreeze use for pain relief when sites are no longer scabbed.             Therapeutic Exercise:   Initiated " supine dowel matthew AAROM.  Completed  2 sets 10 reps each of tricep punches, shoulder flexion and  added standing internal rotation and shoulder ext.  HEP provided   Initiated overhead pulley HEP today. Completed 15 reps each for bilateral flexion, scaption at 45 degrees and abduction. 5 sec hold at end range.Added to HEP.  Pt has purchased pulleys for home use.   Post-tx pain: 3/10, unchanged since start of session    Assessment/Plan Pt motivated to return to function. Noted good AROM in supine today.  Pt to continue with modality use, scar massage when incisions are  no longer scabbed.   Pt to continue with  supine dowel matthew HEP , increasing reps to  as tolerated for pain. Plan to progress patient according to protocol and as tolerated for pain.  Next visit check elbow and FA ROM.   OP EDUCATION:  Education  Individual(s) Educated: Patient  Home Program: PROM, AROM, Modalities, Orthotic wearing schedule, care and precautions  Equipment: Other (dowel matthew)  Risk and Benefits Discussed with Patient/Caregiver/Other: yes  Patient/Caregiver Demonstrated Understanding: yes  Plan of Care Discussed and Agreed Upon: yes  Patient Response to Education: Patient/Caregiver Verbalized Understanding of Information, Patient/Caregiver Performed Return Demonstration of Exercises/Activities, Patient/Caregiver Asked Appropriate Questions    Goals:  Active       OT Goals       1. Patient will increase R shoulder PROM flexion and abduction to 130 degrees for improved ease of ADL's. (Met)       Start:  04/25/25    Expected End:  06/06/25    Resolved:  04/30/25         2. Patient will increase R shoulder PROM ER to 60 degrees for increased ease of ADL's. (Met)       Start:  04/25/25    Expected End:  06/06/25    Resolved:  04/30/25         3. Patient will increase R shoulder AROM flexion and abduction to 130 degrees for improved ease of ADL's. (Progressing)       Start:  04/25/25    Expected End:  08/15/25            4. Patient will  increase R shoulder AROM ER to 60 degrees for increased ease of ADL's. (Progressing)       Start:  04/25/25    Expected End:  08/15/25            5. 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 Patient will increase overall functional ease and independence AEB UEFI score of at least 65/80 for improved quality of life for patient. (Progressing)       Start:  04/25/25    Expected End:  08/15/25

## 2025-06-09 ASSESSMENT — DERMATOLOGY QUALITY OF LIFE (QOL) ASSESSMENT
RATE HOW BOTHERED YOU ARE BY EFFECTS OF YOUR SKIN PROBLEMS ON YOUR ACTIVITIES (EG, GOING OUT, ACCOMPLISHING WHAT YOU WANT, WORK ACTIVITIES OR YOUR RELATIONSHIPS WITH OTHERS): 1
RATE HOW EMOTIONALLY BOTHERED YOU ARE BY YOUR SKIN PROBLEM (FOR EXAMPLE, WORRY, EMBARRASSMENT, FRUSTRATION): 3
RATE HOW BOTHERED YOU ARE BY EFFECTS OF YOUR SKIN PROBLEMS ON YOUR ACTIVITIES (EG, GOING OUT, ACCOMPLISHING WHAT YOU WANT, WORK ACTIVITIES OR YOUR RELATIONSHIPS WITH OTHERS): 1
RATE HOW BOTHERED YOU ARE BY SYMPTOMS OF YOUR SKIN PROBLEM (EG, ITCHING, STINGING BURNING, HURTING OR SKIN IRRITATION): 1
RATE HOW EMOTIONALLY BOTHERED YOU ARE BY YOUR SKIN PROBLEM (FOR EXAMPLE, WORRY, EMBARRASSMENT, FRUSTRATION): 3
WHAT SINGLE SKIN CONDITION LISTED BELOW IS THE PATIENT ANSWERING THE QUALITY-OF-LIFE ASSESSMENT QUESTIONS ABOUT: NONE OF THE ABOVE
RATE HOW BOTHERED YOU ARE BY SYMPTOMS OF YOUR SKIN PROBLEM (EG, ITCHING, STINGING BURNING, HURTING OR SKIN IRRITATION): 1
WHAT SINGLE SKIN CONDITION LISTED BELOW IS THE PATIENT ANSWERING THE QUALITY-OF-LIFE ASSESSMENT QUESTIONS ABOUT: NONE OF THE ABOVE
DATE THE QUALITY-OF-LIFE ASSESSMENT WAS COMPLETED: 67365

## 2025-06-09 ASSESSMENT — PATIENT GLOBAL ASSESSMENT (PGA): WHAT IS THE PGA: PATIENT GLOBAL ASSESSMENT:  2 - MILD

## 2025-06-10 NOTE — PROGRESS NOTES
Clinical Pharmacy Appointment    Patient ID: Arin Phan is a 59 y.o. female who presents for Prediabetes, Obesity, and Medication Visit.    Pt is here for Follow Up appointment.     Referring Provider/PCP: Dariana Godoy MD  Last visit with PCP: 3/25/25   Next visit with PCP: Not yet scheduled      Subjective     HPI  WEIGHT LOSS  BMI Readings from Last 3 Encounters:   05/15/25 36.95 kg/m²   04/23/25 37.44 kg/m²   04/15/25 38.07 kg/m²      Starting weight: 231 lbs. (12/10)  218 lbs. No scale, Pants are baggier  215lbs in last OV 5/15/25  Current weight: 212lbs, reported on 6/11/25    Lifestyle (Low appetite)  Diet: Changes to the diet: no major changes, rarely eating, she does not remember to eat. Has had bariatric surgery in the past  Has worked with nutritionist/dietician? Yes,    Physical Activity: Changes to activity: no major changes, she does report she is in the process of moving recently, activity is up overall. She is walking up and down her driveway    Other Potential Contributing Factors  Alcohol use: Average 0 drinks/week  Tobacco use: No  Other drug use: No  Medications that may cause weight gain: none  Mental health: No current concerns  Eating Disorders: Denies Hx of any eating disorder  Comorbidities: Comorbidities: sleep apnea not using an appliance  (Had one, but stopped using)      Non-Pharmacological Therapy  Weight loss techniques attempted:  Self-directed dieting: Weight watchers in past    Pharmacological Therapy  Current Medications: Zepbound 7.5 mg on Sundays  Adverse Effects: None  Previous Medications: Ozempic- fatigue, mental fog (through a med spa)    Insurance coverage of weight-loss medications? No,    Eligible for copay cards/programs? Yes,    Eligible for  PAP? Yes, reports income <400% FPL    Medication Estimated Cost Expected weight loss Patient Risks/Cautions Notes   Wegovy (semaglutide) ~$650/month w/ savings card 10-20% None      Zepbound (tirzepatide) $650/month   w/  savings card 10-20%     Qsymia (phentermine-topiramate) $98/month via [facility] [employer] 5-10% None Controlled substance   Contrave (bupropion-naltrexone) $99/month   via [employer] 5-10% None    Adipex (phentermine) ~$15/month 3-5% None Controlled substance,  Short-term use only   Renato (OTC Orlistat) ~$60/month 3-5%  Adverse effects likely outweigh benefit.     Drug Interactions  No relevant drug interactions were noted.    Medication System Management  Patients preferred pharmacy: [facility]  Adherence/Organization: No issues reported  Affordability/Accessibility: Zepbound through [facility] through 12/31/25      Objective   Allergies   Allergen Reactions    House Dust Mite Other     congested, stuffy    Mold Other     congested, stuffy    Pollen Extracts Unknown     Social History     Social History Narrative    Not on file      Medication Review  Current Outpatient Medications   Medication Instructions    acetaminophen (TYLENOL) 650 mg, Every 8 hours PRN    albuterol (Ventolin HFA) 90 mcg/actuation inhaler 2 puffs, Every 6 hours PRN    ASPIRIN-ACETAMINOPHEN-CAFFEINE ORAL 2 tablets, 2 times daily PRN    calcium citrate-vitamin D3 500 mg-12.5 mcg (500 unit) tablet,chewable 1 tablet, 2 times daily    cyanocobalamin (Vitamin B-12) 100 mcg tablet 1 tablet, Every morning    DULoxetine (Cymbalta) 30 mg DR capsule Take 1 capsule by mouth daily x 2 weeks, then increase to 2 capsules (60mg) by mouth daily. Do not crush or chew.    DULoxetine (CYMBALTA) 60 mg, oral, Daily, To start after completing the 30mg capsules (30mg x 2 weeks, 60mg daily afterwards) Do not crush or chew.    minoxidil (LONITEN) 2.5 mg, oral, Daily    multivit-minerals/folic acid (ADULT ONE DAILY MULTIVITAMIN ORAL) Every morning    spironolactone (ALDACTONE) 50 mg, oral, Daily    traZODone (Desyrel) 50 mg tablet Take half to one tablet by mouth about 30 minutes before bedtime as needed for sleep    Zepbound 7.5 mg, subcutaneous, Every 7 days      Vitals  BP  "Readings from Last 2 Encounters:   04/23/25 118/86   04/15/25 101/70     BMI Readings from Last 1 Encounters:   05/15/25 36.95 kg/m²      Labs  A1C  Lab Results   Component Value Date    HGBA1C 5.4 02/17/2025    HGBA1C 5.7 (H) 10/23/2024    HGBA1C 5.7 03/28/2018     BMP  Lab Results   Component Value Date    CALCIUM 9.3 02/17/2025     02/17/2025    K 4.3 02/17/2025    CO2 29 02/17/2025     02/17/2025    BUN 19 02/17/2025    CREATININE 0.65 02/17/2025    EGFR 102 02/17/2025     LFTs  Lab Results   Component Value Date    ALT 20 02/17/2025    AST 24 02/17/2025    ALKPHOS 80 02/17/2025    BILITOT 0.3 02/17/2025     FLP  Lab Results   Component Value Date    TRIG 61 02/17/2025    CHOL 197 02/17/2025    LDLCALC 106 (H) 02/17/2025    HDL 76 02/17/2025     Urine Microalbumin  No results found for: \"MICROALBCREA\"  Weight Management  Wt Readings from Last 3 Encounters:   05/15/25 97.7 kg (215 lb 6.4 oz)   04/23/25 99 kg (218 lb 4.1 oz)   04/15/25 101 kg (221 lb 12.5 oz)      There is no height or weight on file to calculate BMI.     Assessment/Plan   Problem List Items Addressed This Visit          Cardiac and Vasculature    Elevated LDL cholesterol level    Relevant Orders    Referral to Clinical Pharmacy       Endocrine/Metabolic    BMI 38.0-38.9,adult     Other Visit Diagnoses         Class 3 severe obesity due to excess calories with serious comorbidity and body mass index (BMI) of 40.0 to 44.9 in adult    -  Primary    Relevant Orders    Referral to Clinical Pharmacy      Prediabetes        Relevant Orders    Referral to Clinical Pharmacy      Weight loss advised              Patient's current weight reported as ~212lbs. Has lost ~20 lbs. since starting therapy plan.  Goal weight: 175lbs reported per patient (ideal), would love to be 195lbs initially  Will:  Continue Zepbound 7.5mg once weekly given patient not eating much and forgetting to eat. I want to see if part of this is stress and if her appetite " resumes a little once we are farther away from some of the life stress.      Clinical Pharmacist follow-up: 4 weeks, Telehealth visit    Continue all meds under the continuation of care with the referring provider and clinical pharmacy team.    Thank you,  Johny Shepherd PharmD, [facility]  Clinical Pharmacy Specialist-Primary Care  [phone]    Verbal consent to manage patient's drug therapy was obtained from the patient. They were informed they may decline to participate or withdraw from participation in pharmacy services at any time.

## 2025-06-11 ENCOUNTER — TELEMEDICINE (OUTPATIENT)
Dept: PHARMACY | Facility: HOSPITAL | Age: 60
End: 2025-06-11
Payer: MEDICARE

## 2025-06-11 ENCOUNTER — APPOINTMENT (OUTPATIENT)
Dept: DERMATOLOGY | Facility: CLINIC | Age: 60
End: 2025-06-11
Payer: MEDICARE

## 2025-06-11 ENCOUNTER — TREATMENT (OUTPATIENT)
Facility: CLINIC | Age: 60
End: 2025-06-11
Payer: MEDICARE

## 2025-06-11 DIAGNOSIS — E78.00 ELEVATED LDL CHOLESTEROL LEVEL: ICD-10-CM

## 2025-06-11 DIAGNOSIS — L64.9 ANDROGENETIC ALOPECIA: Primary | ICD-10-CM

## 2025-06-11 DIAGNOSIS — R73.03 PREDIABETES: ICD-10-CM

## 2025-06-11 DIAGNOSIS — Z78.9 WEIGHT LOSS ADVISED: ICD-10-CM

## 2025-06-11 DIAGNOSIS — M75.121 COMPLETE TEAR OF RIGHT ROTATOR CUFF, UNSPECIFIED WHETHER TRAUMATIC: ICD-10-CM

## 2025-06-11 DIAGNOSIS — E66.813 CLASS 3 SEVERE OBESITY DUE TO EXCESS CALORIES WITH SERIOUS COMORBIDITY AND BODY MASS INDEX (BMI) OF 40.0 TO 44.9 IN ADULT: Primary | ICD-10-CM

## 2025-06-11 PROCEDURE — RXMED WILLOW AMBULATORY MEDICATION CHARGE

## 2025-06-11 PROCEDURE — 99214 OFFICE O/P EST MOD 30 MIN: CPT

## 2025-06-11 PROCEDURE — 97110 THERAPEUTIC EXERCISES: CPT | Mod: GO,CO

## 2025-06-11 RX ORDER — SPIRONOLACTONE 50 MG/1
50 TABLET, FILM COATED ORAL DAILY
Qty: 30 TABLET | Refills: 5 | Status: SHIPPED | OUTPATIENT
Start: 2025-06-11 | End: 2026-06-11

## 2025-06-11 RX ORDER — MINOXIDIL 2.5 MG/1
2.5 TABLET ORAL DAILY
Qty: 30 TABLET | Refills: 5 | Status: SHIPPED | OUTPATIENT
Start: 2025-06-11 | End: 2025-12-08

## 2025-06-11 ASSESSMENT — PAIN - FUNCTIONAL ASSESSMENT: PAIN_FUNCTIONAL_ASSESSMENT: 0-10

## 2025-06-11 ASSESSMENT — PAIN DESCRIPTION - DESCRIPTORS: DESCRIPTORS: RADIATING;ACHING

## 2025-06-11 ASSESSMENT — PAIN SCALES - GENERAL: PAINLEVEL_OUTOF10: 3

## 2025-06-11 NOTE — Clinical Note
-Discussed the mechanism of action of androgenetic alopecia, including the slowly progressive nature of the condition and the need for any therapy to be continued long term to maintain results.  -Discussed treatment options including topical and oral therapies.     Recommend:    -Arin is tolerating the oral minoxidil well with no adverse effects. Will increase strength of oral Minoxidil to 2.5 mg daily. The risks, benefits, and side effects of the medication, including hypertrichosis, lightheadedness, and rapid heartbeat, were discussed with the patient today.   -Start Spironolactone 50 mg daily. The risks, benefits, and side effects of the medication, including headache, decreased libido, menstrual irregularities, orthostatic hypotension, fatigue, and hyperkalemia were discussed with the patient. Potassium level from 2/17/2025 was normal. Will recheck in 2 weeks after starting Spironolactone.     -Follow up in 6 months.

## 2025-06-11 NOTE — PROGRESS NOTES
"-Occupational Therapy Treatment  Patient Name: Arin Phan  MRN: [id number]  OT Received on: 6/11/2025 OT Received On: 06/11/25  Time Calculation  Start Time: 1046  Stop Time: 1136  Time Calculation (min): 50 min  OT Therapeutic Procedures Time Entry  Therapeutic Exercise Time Entry: 40  Non-Billable Time  Non-billable time: 10  Non-billable time reason: 5 heat/5 ice  Insurance:  Visit Number: 8 of 20    4/28/25 - OT AUTH RECEIVED FOR 20 VISITS (80 UNITS) FOR [id number], [id number], [id number], [id number], [id number] FROM 4/28/25 TO 8/29/25 AUTH # [id number]  JS  4/25/25 - [employer] / EVAL ONLY / 20 VISIT LIMIT YEAR - 0 USED 4/25/25 / $10 CO PAY / OOP 3050 NOT MET / C/S W/S & AVAILITY # [id number]    Subjective   Problem List Items Addressed This Visit           ICD-10-CM       Musculoskeletal and Injuries    Complete tear of right rotator cuff M75.121       1. Complete tear of right rotator cuff, unspecified whether traumatic  Referral to Occupational Therapy            History of Present Illness  Date of onset: 1/8/2025  Date of surgery: 4/23/2025  Mechanism of injury: Patient is a 59-year-old female who sustained a fall on her steps in January resulting in R rotator tear. Patient underwent #1 right shoulder arthroscopic double row rotator cuff repair  #2 right shoulder arthroscopic biceps tenodesis  #3 right shoulder arthroscopic major joint debridement of labral tear biceps tear subscapular fraying with significant joint synovitis  #4 right shoulder arthroscopic anterior acromioplasty extensive decompression and bursectomy for severe subacromial bursitis with impingement lesion        Referred by: Dr. Vegas, next follow up  8/4/25 no more sling wear, AROM now    Patient reports \" my neck is  still pretty sore \". No more oxy at bedtime  and ibupfrofen the last  4 of 7( same)  \"night time mostly.\"  Goals and precautions reviewed with patient. She verbalized understanding.   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 "     Precautions:   0-2 weeks: AAROM R elbow/FA  5/7/25  0-6 weeks: PROM R shoulder   6-12 weeks: AROM R shoulder 6/4/25- current  12+ weeks: Strengthening 7/16/25    Performing HEP?: Yes    Pain: 3-4/10 in top of capsule , radiating up R side of neck.  achy  0/10 rest   Pain Assessment  Pain Assessment: 0-10  0-10 (Numeric) Pain Score: 3  Pain Type: Acute pain, Surgical pain  Pain Location: Shoulder  Pain Orientation: Right, Upper (neck also)  Pain Descriptors: Radiating, Aching  Pain Frequency: Constant/continuous  Objective   Upper Extremity ROM                AAROM  ( Dowel)  PROM MMT MMT       Right Left Right Left Right Left   SHOULDER Flexion 172 ( was 165)   WFL NT WFL     Extension 60( was 52)  WFL  WFL NT WFL     Abduction 140  ( was 110)  WFL  WFL NT WFL     Internal Rotation To stomach WFL  WFL NT WFL     External Rotation Mid thoracic in standing  85 degrees in supine with dowel matthew   WFL  WFL NT WFL   AAROM   ELBOW Extension -3 ( was -20) WFL 0 WFL NT WFL     Flexion  120   WFL NT WFL   AAROM  FOREARM Pronation 90 WFL 90 WFL NT WFL     Supination 78 ( was 60) WFL 90   WFL NT WFL      WRIST Flexion WFL WFL WFL WFL NT WFL     Extension WFL WFL WFL WFL NT WFL     Radial Deviation WFL WFL WFL WFL         Ulnar Deviation WFL WFL WFL WFL       Physical Observation: healing incisions, continued light scabbing.Covered with bandaids No excessive redness or drainage.   Edema: none   Sensory: intact  Numbness/Tingling: no longer noted   Treatment:  Reviewed scar massage. To complete on closed areas of incisions.    Modalities:  Moist heat applied x 5 min at start of session during goal review to promote muscle movement during session  Ice applied x 5 min at end of session during wrap up to reduce edema and inflammation.   Reviewed benefit of biofreeze use for pain relief when sites are no longer scabbed.             Therapeutic Exercise:   Completed supine dowel matthew AAROM.  Completed  2 sets 15( was  10)  reps each of tricep punches, shoulder flexion , horizontal abduction, external rotation and standing internal rotation and shoulder ext.  HEP provided   Completed overhead pulley HEP today. Completed 20 ( was  15) reps each for bilateral flexion, scaption at 45 degrees and abduction. 5 sec hold at end range.  Pt has them at home and has started using as part of HEP 2 times daily.  Post-tx pain: 3-4/10, unchanged since start of session    Assessment/Plan Pt motivated to return to function. Noted increased  AROM in supine  and standing today.  Pt to continue with modality use, scar massage when incisions are  no longer scabbed.   Pt to continue with  supine dowel matthew HEP , seated pulleys increasing reps to  as tolerated for pain. Plan to progress patient according to protocol and as tolerated for pain.  OP EDUCATION:  Education  Individual(s) Educated: Patient  Education Provided: POC discussed and agreed upon, Risk and benefits of OT discussed with patient or other, Diagnosis & Precautions, Symptom management  Home Program: PROM, AROM, Modalities, Orthotic wearing schedule, care and precautions, Wound/scar care  Equipment: Other, Pulleys (dowel matthew)  Risk and Benefits Discussed with Patient/Caregiver/Other: yes  Patient/Caregiver Demonstrated Understanding: yes  Plan of Care Discussed and Agreed Upon: yes  Patient Response to Education: Patient/Caregiver Verbalized Understanding of Information, Patient/Caregiver Performed Return Demonstration of Exercises/Activities, Patient/Caregiver Asked Appropriate Questions    Goals:  Active       OT Goals       1. Patient will increase R shoulder PROM flexion and abduction to 130 degrees for improved ease of ADL's. (Met)       Start:  04/25/25    Expected End:  06/06/25    Resolved:  04/30/25         2. Patient will increase R shoulder PROM ER to 60 degrees for increased ease of ADL's. (Met)       Start:  04/25/25    Expected End:  06/06/25    Resolved:  04/30/25          3. Patient will increase R shoulder AROM flexion and abduction to 130 degrees for improved ease of ADL's. (Progressing)       Start:  04/25/25    Expected End:  08/15/25            4. Patient will increase R shoulder AROM ER to 60 degrees for increased ease of ADL's. (Progressing)       Start:  04/25/25    Expected End:  08/15/25            5. 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 Patient will increase overall functional ease and independence AEB UEFI score of at least 65/80 for improved quality of life for patient. (Progressing)       Start:  04/25/25    Expected End:  08/15/25

## 2025-06-11 NOTE — Clinical Note
Decreased hair density in androgenetic areas. On dermoscopy, miniaturized hair follicles are seen and hair shafts of varying diameters are noted.

## 2025-06-11 NOTE — PROGRESS NOTES
Subjective     Arin Phan is a 59 y.o. female who presents for the following: Alopecia. Patient was last seen 2/12/2025. She has been taking oral minoxidil 1.25 mg daily and tolerating well with no adverse effects. She continues to get facial hair which was present before starting the oral minoxidil- has not gotten worse since starting the medication.     Intake Questions  Do you have any new or changing Lesions?: (Patient-Rptd) (P) No  For patients coming in for a Follow-up Visit:  Have there been any changes in your health since your last visit?: (Patient-Rptd) (P) No  Are you an organ transplant recipient?: (Patient-Rptd) (P) No  Have you had or do you have a Staph Infection?: (Patient-Rptd) (P) No  Have you had or do you have Vacular Disease?: (Patient-Rptd) (P) No  Do you use sunscreen?: (Patient-Rptd) (P) None  Do you use a tanning bed?: (Patient-Rptd) (P) No  Are you trying to get pregnant?: (Patient-Rptd) (P) No  Are you on birth control?: (Patient-Rptd) (P) No  Do you have irregular menstrual cycles?: (Patient-Rptd) (P) No    Review of Systems:  No other skin or systemic complaints other than what is documented elsewhere in the note.    The following portions of the chart were reviewed this encounter and updated as appropriate:   Allergies  Meds         Skin Cancer History  Biopsy Log Book  No skin cancers from Specimen Tracking.    Additional History      Specialty Problems    None       Objective   Well appearing patient in no apparent distress; mood and affect are within normal limits.    A focused skin examination was performed. All findings within normal limits unless otherwise noted below.    Assessment/Plan   Skin Exam  1. ANDROGENETIC ALOPECIA  Scalp  Decreased hair density in androgenetic areas. On dermoscopy, miniaturized hair follicles are seen and hair shafts of varying diameters are noted.    -Discussed the mechanism of action of androgenetic alopecia, including the slowly progressive  nature of the condition and the need for any therapy to be continued long term to maintain results.  -Discussed treatment options including topical and oral therapies.     Recommend:    -Arin is tolerating the oral minoxidil well with no adverse effects. Will increase strength of oral Minoxidil to 2.5 mg daily. The risks, benefits, and side effects of the medication, including hypertrichosis, lightheadedness, and rapid heartbeat, were discussed with the patient today.   -Start Spironolactone 50 mg daily. The risks, benefits, and side effects of the medication, including headache, decreased libido, menstrual irregularities, orthostatic hypotension, fatigue, and hyperkalemia were discussed with the patient. Potassium level from 2/17/2025 was normal. Will recheck in 2 weeks after starting Spironolactone.     -Follow up in 6 months.   spironolactone (Aldactone) 50 mg tablet - Scalp  Take 1 tablet (50 mg) by mouth once daily.    Potassium - Scalp  Related Medications  minoxidil (Loniten) 2.5 mg tablet  Take 1 tablet (2.5 mg) by mouth once daily.

## 2025-06-13 ENCOUNTER — TELEPHONE (OUTPATIENT)
Dept: PRIMARY CARE | Facility: CLINIC | Age: 60
End: 2025-06-13
Payer: MEDICARE

## 2025-06-13 ENCOUNTER — PHARMACY VISIT (OUTPATIENT)
Dept: PHARMACY | Facility: CLINIC | Age: 60
End: 2025-06-13
Payer: COMMERCIAL

## 2025-06-13 DIAGNOSIS — G89.29 OTHER CHRONIC PAIN: ICD-10-CM

## 2025-06-13 NOTE — TELEPHONE ENCOUNTER
Please call and schedule a visit with me if possible in next few weeks. I am happy to do virtual. Let her know that I need a little more detail about her RLS type symptoms just so that I can be sure I'm treating appropriately. Thank you!

## 2025-06-13 NOTE — TELEPHONE ENCOUNTER
Pt is calling for advice and or an appt for RLS or difficulty sleeping it's ongoing some day bad and some days it's good. The trazodone she's getting few hours of sleep and wake up and hen an few hours later fall back to sleep. OTC: Advil prn Please advise [phone]      Pharm:  [facility]

## 2025-06-18 ENCOUNTER — TREATMENT (OUTPATIENT)
Facility: CLINIC | Age: 60
End: 2025-06-18
Payer: MEDICARE

## 2025-06-18 DIAGNOSIS — M75.121 COMPLETE TEAR OF RIGHT ROTATOR CUFF, UNSPECIFIED WHETHER TRAUMATIC: ICD-10-CM

## 2025-06-18 PROCEDURE — 97110 THERAPEUTIC EXERCISES: CPT | Mod: GO | Performed by: OCCUPATIONAL THERAPIST

## 2025-06-18 RX ORDER — DULOXETIN HYDROCHLORIDE 60 MG/1
60 CAPSULE, DELAYED RELEASE ORAL DAILY
Qty: 90 CAPSULE | Refills: 3 | Status: SHIPPED | OUTPATIENT
Start: 2025-06-18

## 2025-06-18 ASSESSMENT — PAIN SCALES - GENERAL: PAINLEVEL_OUTOF10: 1

## 2025-06-18 ASSESSMENT — PAIN - FUNCTIONAL ASSESSMENT: PAIN_FUNCTIONAL_ASSESSMENT: 0-10

## 2025-06-18 NOTE — TELEPHONE ENCOUNTER
I made her a virtual ed of day next Thursday. She also was asking about her Cymbalta, she accidentally removed it from her mychart. The old order is in there but she said the new one you ordered she took out on accident. She can;t recall the directions and she needs a refill. Can you advise?

## 2025-06-18 NOTE — TELEPHONE ENCOUNTER
Please call and let patient know it is the cymbalta 60mg capsule. To take 1 capsule once a day by mouth (can be morning or evening). Thank you!

## 2025-06-18 NOTE — PROGRESS NOTES
"Occupational Therapy Treatment  Patient Name: Arin Phan  MRN: [id number]  OT Received on: 6/18/2025        Time Calculation  Start Time: 1100  Stop Time: 1145  Time Calculation (min): 45 min  OT Therapeutic Procedures Time Entry  Therapeutic Exercise Time Entry: 45    Insurance:  Visit Number: 9 of 20  Authorization #: [id number]  Authorization Dates: 4/28/25-8/29/25  Approved Codes: 18048, 49617, 51094, 08845, 35980  Insurance Type: [facility]      Subjective   Problem List Items Addressed This Visit           ICD-10-CM    Complete tear of right rotator cuff M75.121     Current Problem/Reason for visit:  S/p R RTC repair on 4/23/25    Referred by: Dr. Vegas    Patient reports \"I was pretty sore after the last session, but I feel really good today.\"    Precautions:   0-2 weeks: AAROM R elbow/FA  0-6 weeks: PROM R shoulder  6-12 weeks: AROM R shoulder  12+ weeks: Strengthening    Performing HEP?: Yes    Pain:  Pain Assessment  Pain Assessment: 0-10  0-10 (Numeric) Pain Score: 1  Pain Location: Shoulder  Pain Orientation: Right    Objective     8 weeks post-op    Advil PRN for pain; taken prior to tx sx.    Edema: Mild RUE proximally  Sensory: Intact   Numbness/Tingling: N/a      Treatment:    Therapeutic Exercise:  UBE seated without resistance x6 minutes; 3 FW/3 BW.  Wall washes with RUE for shoulder flexion and CW/CCW x10 reps each.  Overhead pulleys for shoulder flexion and abduction x20 reps each.  Supine scapular protraction/retraction and CW/CCW stabilization x10 reps x 3 sets each.  Supine/standing dowel matthew ther ex for shoulder flexion, horizontal abduction/adduction, abduction/adduction, ER, IR, and extension x10 reps each.    Post-tx pain: 3/10    Assessment/Plan Patient progressing steadily; AROM is excellent at this time; will continue to advance intervention as indicated/tolerated.      OP EDUCATION:  Education  Individual(s) Educated: Patient  Home Program: AAROM, AROM, " Modalities    Goals:  Active       OT Goals       1. Patient will increase R shoulder PROM flexion and abduction to 130 degrees for improved ease of ADL's. (Met)       Start:  04/25/25    Expected End:  06/06/25    Resolved:  04/30/25         2. Patient will increase R shoulder PROM ER to 60 degrees for increased ease of ADL's. (Met)       Start:  04/25/25    Expected End:  06/06/25    Resolved:  04/30/25         3. Patient will increase R shoulder AROM flexion and abduction to 130 degrees for improved ease of ADL's. (Progressing)       Start:  04/25/25    Expected End:  08/15/25            4. Patient will increase R shoulder AROM ER to 60 degrees for increased ease of ADL's. (Progressing)       Start:  04/25/25    Expected End:  08/15/25            5. 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 Patient will increase overall functional ease and independence AEB UEFI score of at least 65/80 for improved quality of life for patient. (Progressing)       Start:  04/25/25    Expected End:  08/15/25

## 2025-06-19 ENCOUNTER — TELEPHONE (OUTPATIENT)
Dept: PRIMARY CARE | Facility: CLINIC | Age: 60
End: 2025-06-19
Payer: MEDICARE

## 2025-06-19 ENCOUNTER — TELEMEDICINE (OUTPATIENT)
Dept: PRIMARY CARE | Facility: CLINIC | Age: 60
End: 2025-06-19
Payer: MEDICARE

## 2025-06-19 DIAGNOSIS — G25.81 RESTLESS LEG: ICD-10-CM

## 2025-06-19 DIAGNOSIS — J45.30 MILD PERSISTENT ASTHMA WITHOUT COMPLICATION (HHS-HCC): ICD-10-CM

## 2025-06-19 DIAGNOSIS — F51.01 PRIMARY INSOMNIA: Primary | ICD-10-CM

## 2025-06-19 PROBLEM — J45.20 MILD INTERMITTENT ASTHMA WITHOUT COMPLICATION (HHS-HCC): Status: RESOLVED | Noted: 2023-11-02 | Resolved: 2025-06-19

## 2025-06-19 PROBLEM — J45.909 ASTHMA: Status: RESOLVED | Noted: 2023-11-02 | Resolved: 2025-06-19

## 2025-06-19 PROCEDURE — 99213 OFFICE O/P EST LOW 20 MIN: CPT | Performed by: STUDENT IN AN ORGANIZED HEALTH CARE EDUCATION/TRAINING PROGRAM

## 2025-06-19 PROCEDURE — 1036F TOBACCO NON-USER: CPT | Performed by: STUDENT IN AN ORGANIZED HEALTH CARE EDUCATION/TRAINING PROGRAM

## 2025-06-19 RX ORDER — ZOLPIDEM TARTRATE 5 MG/1
TABLET ORAL
Qty: 60 TABLET | Refills: 0 | Status: SHIPPED | OUTPATIENT
Start: 2025-06-19

## 2025-06-19 RX ORDER — ROPINIROLE 0.25 MG/1
TABLET, FILM COATED ORAL
Qty: 30 TABLET | Refills: 2 | Status: SHIPPED | OUTPATIENT
Start: 2025-06-19

## 2025-06-19 ASSESSMENT — ENCOUNTER SYMPTOMS
DEPRESSION: 0
LOSS OF SENSATION IN FEET: 0
OCCASIONAL FEELINGS OF UNSTEADINESS: 0

## 2025-06-19 ASSESSMENT — PATIENT HEALTH QUESTIONNAIRE - PHQ9
1. LITTLE INTEREST OR PLEASURE IN DOING THINGS: NOT AT ALL
SUM OF ALL RESPONSES TO PHQ9 QUESTIONS 1 AND 2: 0
2. FEELING DOWN, DEPRESSED OR HOPELESS: NOT AT ALL

## 2025-06-19 ASSESSMENT — PAIN SCALES - GENERAL: PAINLEVEL_OUTOF10: 0-NO PAIN

## 2025-06-19 NOTE — PROGRESS NOTES
OUTPATIENT VISIT - [place]   Virtual or Telephone Consent    An interactive audio and video telecommunication system which permits real time communications between the patient (at the originating site) and provider (at the distant site) was utilized to provide this telehealth service.   Verbal consent was requested and obtained from Arin Phan on this date, 06/19/25 for a telehealth visit and the patient's location was confirmed at the time of the visit.    Subjective   Patient ID: Arin Phan is a 59 y.o. female who presents for No chief complaint on file..    HPI  HPI     Having trouble sleeping, legs going crazy at night like feels like running a marathon. Legs will not stop moving. Has had a stressful past few months - surgery, moving, dad passed away. Her insomnia started before this and is not just related to her legs. Cannot fall asleep, will lay awake hours. Trazodone not helping. Has not tried other medications for this or the restless legs.     Objective     ROS  Review of Systems  All pertinent positive symptoms are included in the history of present illness.  All other systems have been reviewed and are negative and noncontributory to this patient's current ailments.    PHQ9/GAD7:  Patient Health Questionnaire-2 Score: 0 (6/19/2025  8:57 AM)   No data recorded   No data recorded     Current Medications  Current Outpatient Medications   Medication Instructions    acetaminophen (TYLENOL) 650 mg, Every 8 hours PRN    albuterol (Ventolin HFA) 90 mcg/actuation inhaler 2 puffs, Every 6 hours PRN    ASPIRIN-ACETAMINOPHEN-CAFFEINE ORAL 2 tablets, 2 times daily PRN    calcium citrate-vitamin D3 500 mg-12.5 mcg (500 unit) tablet,chewable 1 tablet, 2 times daily    cyanocobalamin (Vitamin B-12) 100 mcg tablet 1 tablet, Every morning    DULoxetine (CYMBALTA) 60 mg, oral, Daily, Do not crush or chew.    minoxidil (LONITEN) 2.5 mg, oral, Daily    multivit-minerals/folic acid (ADULT ONE DAILY MULTIVITAMIN  ORAL) Every morning    spironolactone (ALDACTONE) 50 mg, oral, Daily    traZODone (Desyrel) 50 mg tablet Take half to one tablet by mouth about 30 minutes before bedtime as needed for sleep    Zepbound 7.5 mg, subcutaneous, Every 7 days        Allergies  Allergies[1]     VITAL SIGNS  There were no vitals filed for this visit.  There were no vitals filed for this visit.   There is no height or weight on file to calculate BMI.     Physical Exam  Constitutional:       General: She is not in acute distress.  Pulmonary:      Effort: Pulmonary effort is normal. No respiratory distress.   Neurological:      Mental Status: She is alert.   Psychiatric:         Mood and Affect: Mood normal.         Thought Content: Thought content normal.         Assessment/Plan   Assessment & Plan  Primary insomnia  Chronic, uncontrolled despite trazodone trial and worsened with RLS symptoms  Trial ambien 5-10 mg nightly prn insomnia  Controlled medication agreement reviewed and sent to be signed via [employer]  UDS ordered, will obtain if maintained on ambien   Orders:    Drug Screen, Urine With Reflex to Confirmation; Future    zolpidem (Ambien) 5 mg tablet; Take 1 tablet by mouth about 30 minutes prior to bed; can take a second tablet if does not assist with sleep    Restless leg  Chronic, has not been diagnosed with RLS though based on description likely is the cause for her symptoms  Briefly reviewed therapy including control (gabapentin, lyrica) vs requip  Reviewed side effects to requip, verbalized understanding and open to trial    Orders:    rOPINIRole (Requip) 0.25 mg tablet; Take 1 tablet nightly prior to bed for restless legs; if symptoms not improved can take a second tablet nightly    Mild persistent asthma without complication ([facility])  Chronic, noted on review of chart, well controlled with albuterol prn, continue to monitor           Counseling:       Medication education:           Education:  The patient is counseled  regarding potential side-effects of all new medications          Understanding:  Patient expressed understanding of information conveyed today          Adherence:  No barriers to adherence identified     Follow-up: 2-4 weeks    ** Please excuse any errors in grammar or translation related to this dictation. Voice recognition software was utilized to prepare this document. **       Dariana Godoy MD   06/19/25          [1]   Allergies  Allergen Reactions    House Dust Mite Other     congested, stuffy    Mold Other     congested, stuffy    Pollen Extracts Unknown

## 2025-06-19 NOTE — TELEPHONE ENCOUNTER
Please call and schedule a follow-up insomnia/restless legs with me in 2-4 weeks. Can be virtual, 15 minutes is okay. Thanks!!

## 2025-06-19 NOTE — ASSESSMENT & PLAN NOTE
Chronic, has not been diagnosed with RLS though based on description likely is the cause for her symptoms  Briefly reviewed therapy including control (gabapentin, lyrica) vs requip  Reviewed side effects to requip, verbalized understanding and open to trial    Orders:    rOPINIRole (Requip) 0.25 mg tablet; Take 1 tablet nightly prior to bed for restless legs; if symptoms not improved can take a second tablet nightly

## 2025-06-19 NOTE — ASSESSMENT & PLAN NOTE
Chronic, uncontrolled despite trazodone trial and worsened with RLS symptoms  Trial ambien 5-10 mg nightly prn insomnia  Controlled medication agreement reviewed and sent to be signed via [facility]  UDS ordered, will obtain if maintained on ambien   Orders:    Drug Screen, Urine With Reflex to Confirmation; Future    zolpidem (Ambien) 5 mg tablet; Take 1 tablet by mouth about 30 minutes prior to bed; can take a second tablet if does not assist with sleep

## 2025-06-25 ENCOUNTER — TREATMENT (OUTPATIENT)
Facility: CLINIC | Age: 60
End: 2025-06-25
Payer: MEDICARE

## 2025-06-25 DIAGNOSIS — M75.121 COMPLETE TEAR OF RIGHT ROTATOR CUFF, UNSPECIFIED WHETHER TRAUMATIC: ICD-10-CM

## 2025-06-25 PROCEDURE — 97110 THERAPEUTIC EXERCISES: CPT | Mod: GO,CO

## 2025-06-25 ASSESSMENT — PAIN - FUNCTIONAL ASSESSMENT: PAIN_FUNCTIONAL_ASSESSMENT: 0-10

## 2025-06-25 ASSESSMENT — PAIN DESCRIPTION - DESCRIPTORS: DESCRIPTORS: SORE;ACHING

## 2025-06-25 ASSESSMENT — PAIN SCALES - GENERAL: PAINLEVEL_OUTOF10: 4

## 2025-06-25 NOTE — PROGRESS NOTES
"-Occupational Therapy Treatment/10th visit progress note  Patient Name: Arin Phan  MRN: [id number]  OT Received on: 6/25/2025 OT Received On: 06/25/25  Time Calculation  Start Time: 1019  Stop Time: 1119  Time Calculation (min): 60 min  OT Therapeutic Procedures Time Entry  Therapeutic Exercise Time Entry: 50  Non-Billable Time  Non-billable time: 10  Non-billable time reason: 5 nheat/5 ice  Insurance:  Visit Number: 10 of 20 4/28/25 - OT AUTH RECEIVED FOR 20 VISITS (80 UNITS) FOR [id number], [id number], [id number], [id number], [id number] FROM 4/28/25 TO 8/29/25 AUTH # [id number]  JS  4/25/25 - [employer] / EVAL ONLY / 20 VISIT LIMIT YEAR - 0 USED 4/25/25 / $10 CO PAY / OOP 3050 NOT MET / C/S W/S & AVAILITY # [id number]    Subjective   Problem List Items Addressed This Visit           ICD-10-CM       Musculoskeletal and Injuries    Complete tear of right rotator cuff M75.121       1. Complete tear of right rotator cuff, unspecified whether traumatic  Referral to Occupational Therapy            History of Present Illness  Date of onset: 1/8/2025  Date of surgery: 4/23/2025  Mechanism of injury: Patient is a 59-year-old female who sustained a fall on her steps in January resulting in R rotator tear. Patient underwent #1 right shoulder arthroscopic double row rotator cuff repair  #2 right shoulder arthroscopic biceps tenodesis  #3 right shoulder arthroscopic major joint debridement of labral tear biceps tear subscapular fraying with significant joint synovitis  #4 right shoulder arthroscopic anterior acromioplasty extensive decompression and bursectomy for severe subacromial bursitis with impingement lesion        Referred by: Dr. Vegas, next follow up  8/4/25 no more sling wear, AROM now    Patient reports \" my neck is  still pretty sore . I have been moving into a new place and it's a lot of work\". Took  ibupfrofen the last  2 of 7( was 4 of 7 )  \"night time mostly.\"  Goals and precautions reviewed with patient. She " "verbalized understanding.   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     Precautions:   0-2 weeks: AAROM R elbow/FA  5/7/25  0-6 weeks: PROM R shoulder   6-12 weeks: AROM R shoulder 6/4/25- currently 9 weeks  12+ weeks: Strengthening 7/16/25    Performing HEP?: Yes    Pain: 4/10 in top of capsule , radiating up both sides  of neck. \" Achy\"  Objective   UEFI completed by patient at 45/80 ( increased from 7/80 at eval)   Upper Extremity ROM                AAROM standing   ( Dowel)  PROM MMT MMT       Right Left Right Left Right Left   SHOULDER Flexion 165 ( was 90 at eval)   WFL NT WFL     Extension 65( was neutral at eval WFL  WFL NT WFL     Abduction 165  ( was 60 at eval)  WFL  WFL NT WFL     Internal Rotation Mid/upper  thoracic in standing( was 10 at eval)  WFL  WFL NT WFL     External Rotation 78 degrees (Seated  with dowel matthew)  Was 10 degrees at eval   WFL  WFL NT WFL   AAROM   ELBOW Extension -3 ( was -20 at eval) WFL 0 WFL NT WFL     Flexion  144( was 120 at eval)    WFL NT WFL   AAROM  FOREARM Pronation 90 WFL 90 WFL NT WFL     Supination 78 ( was 60 at eval) WFL 90   WFL NT WFL      WRIST Flexion WFL WFL WFL WFL NT WFL     Extension WFL WFL WFL WFL NT WFL     Radial Deviation WFL WFL WFL WFL         Ulnar Deviation WFL WFL WFL WFL       Physical Observation: healing incisions, continued light scabbing.Covered with bandaids No excessive redness or drainage.   Edema: none   Sensory: impaired  Numbness/Tingling: sometimes in fingers  Treatment:  Reviewed scar massage. To complete on closed areas of incisions.    Modalities:  Moist heat applied x 5 min at start of session during goal review to promote muscle movement during session  Ice applied x 5 min at end of session during wrap up to reduce edema and inflammation.   Reviewed benefit of biofreeze use for pain relief when sites are no longer scabbed.             Therapeutic Exercise:  UBE seated without resistance x6 minutes; alternating " directions each minute.   Wall washes with RUE for shoulder flexion and CW/CCW x 15 ( was 10) reps each.  Overhead pulleys for shoulder flexion and abduction x 3 minutes  Supine scapular protraction/retraction and CW/CCW stabilization x15   ( was 10)  reps x 3 sets each.  Supine/standing dowel matthew ther ex for shoulder flexion, horizontal abduction/adduction, abduction/adduction, ER, IR, and extension x 15 ( was 10) reps each.    Post-tx pain: 4 /10, unchanged since start of session    Assessment/Plan Pt motivated to return to function. Noted increased  AROM in supine  and standing today, increased UEFI score.   Pt to continue with modality use, scar massage when incisions are  no longer scabbed.   Pt to continue with  supine dowel  and standing dowel matthew HEP , seated pulleys and standing towel slides  increasing reps to  as tolerated for pain. Plan to progress patient according to protocol and as tolerated for pain.  OP EDUCATION:  Education  Individual(s) Educated: Patient  Education Provided: Risk and benefits of OT discussed with patient or other, POC discussed and agreed upon  Home Program: AAROM, AROM, Modalities  Equipment: Other, Pulleys  Risk and Benefits Discussed with Patient/Caregiver/Other: yes  Patient/Caregiver Demonstrated Understanding: yes  Plan of Care Discussed and Agreed Upon: yes  Patient Response to Education: Patient/Caregiver Verbalized Understanding of Information, Patient/Caregiver Performed Return Demonstration of Exercises/Activities, Patient/Caregiver Asked Appropriate Questions    Goals:  Active       OT Goals       1. Patient will increase R shoulder PROM flexion and abduction to 130 degrees for improved ease of ADL's. (Met)       Start:  04/25/25    Expected End:  06/06/25    Resolved:  04/30/25         2. Patient will increase R shoulder PROM ER to 60 degrees for increased ease of ADL's. (Met)       Start:  04/25/25    Expected End:  06/06/25    Resolved:  04/30/25         3. Patient  will increase R shoulder AROM flexion and abduction to 130 degrees for improved ease of ADL's. (Met)       Start:  04/25/25    Expected End:  08/15/25    Resolved:  06/25/25         4. Patient will increase R shoulder AROM ER to 60 degrees for increased ease of ADL's. (Met)       Start:  04/25/25    Expected End:  08/15/25    Resolved:  06/25/25         5. 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 Patient will increase overall functional ease and independence AEB UEFI score of at least 65/80 for improved quality of life for patient. (Progressing)       Start:  04/25/25    Expected End:  08/15/25

## 2025-06-26 ENCOUNTER — APPOINTMENT (OUTPATIENT)
Dept: PRIMARY CARE | Facility: CLINIC | Age: 60
End: 2025-06-26
Payer: MEDICARE

## 2025-07-15 PROCEDURE — RXMED WILLOW AMBULATORY MEDICATION CHARGE

## 2025-07-16 ENCOUNTER — DOCUMENTATION (OUTPATIENT)
Facility: CLINIC | Age: 60
End: 2025-07-16
Payer: MEDICARE

## 2025-07-16 NOTE — PROGRESS NOTES
Occupational Therapy                 Therapy Communication Note    Patient Name: Arin Phan  MRN: [id number]  Department:   Room: Room/bed info not found  Today's Date: 7/16/2025     Discipline: Occupational Therapy    Missed Visit Reason:  Pt no show/no call. Writer called and left voicemail of next scheduled OT session.     Missed Time: No Show

## 2025-07-17 ENCOUNTER — APPOINTMENT (OUTPATIENT)
Dept: PHARMACY | Facility: HOSPITAL | Age: 60
End: 2025-07-17
Payer: MEDICARE

## 2025-07-17 ENCOUNTER — APPOINTMENT (OUTPATIENT)
Dept: PRIMARY CARE | Facility: CLINIC | Age: 60
End: 2025-07-17
Payer: MEDICARE

## 2025-07-17 ENCOUNTER — PHARMACY VISIT (OUTPATIENT)
Dept: PHARMACY | Facility: CLINIC | Age: 60
End: 2025-07-17
Payer: COMMERCIAL

## 2025-07-22 ENCOUNTER — APPOINTMENT (OUTPATIENT)
Dept: CARDIOLOGY | Facility: HOSPITAL | Age: 60
End: 2025-07-22
Payer: MEDICARE

## 2025-07-23 ENCOUNTER — TREATMENT (OUTPATIENT)
Facility: CLINIC | Age: 60
End: 2025-07-23
Payer: MEDICARE

## 2025-07-23 ENCOUNTER — APPOINTMENT (OUTPATIENT)
Dept: NEUROLOGY | Facility: CLINIC | Age: 60
End: 2025-07-23
Payer: MEDICARE

## 2025-07-23 DIAGNOSIS — M75.121 COMPLETE TEAR OF RIGHT ROTATOR CUFF, UNSPECIFIED WHETHER TRAUMATIC: ICD-10-CM

## 2025-07-23 PROCEDURE — 97110 THERAPEUTIC EXERCISES: CPT | Mod: GO,CO

## 2025-07-23 ASSESSMENT — PAIN SCALES - GENERAL: PAINLEVEL_OUTOF10: 2

## 2025-07-23 ASSESSMENT — PAIN - FUNCTIONAL ASSESSMENT: PAIN_FUNCTIONAL_ASSESSMENT: 0-10

## 2025-07-23 ASSESSMENT — PAIN DESCRIPTION - DESCRIPTORS: DESCRIPTORS: ACHING

## 2025-07-23 NOTE — PROGRESS NOTES
"-Occupational Therapy Treatment/10th visit progress note  Patient Name: Arin Phan  MRN: [id number]  OT Received on: 7/23/2025 OT Received On: 07/23/25  Time Calculation  Start Time: 1233  Stop Time: 1321  Time Calculation (min): 48 min  OT Therapeutic Procedures Time Entry  Therapeutic Exercise Time Entry: 38  Non-Billable Time  Non-billable time: 10  Non-billable time reason: 5 heat/5 ice  Insurance:  Visit Number: 11 of 20 4/28/25 - OT AUTH RECEIVED FOR 20 VISITS (80 UNITS) FOR [id number], [id number], [id number], [id number], [id number] FROM 4/28/25 TO 8/29/25 AUTH # [id number]  JS  4/25/25 - [employer] / EVAL ONLY / 20 VISIT LIMIT YEAR - 0 USED 4/25/25 / $10 CO PAY / OOP 3050 NOT MET / C/S W/S & AVAILITY # [id number]    Subjective   Problem List Items Addressed This Visit           ICD-10-CM       Musculoskeletal and Injuries    Complete tear of right rotator cuff M75.121         1. Complete tear of right rotator cuff, unspecified whether traumatic  Referral to Occupational Therapy            History of Present Illness  Date of onset: 1/8/2025  Date of surgery: 4/23/2025  Mechanism of injury: Patient is a 59-year-old female who sustained a fall on her steps in January resulting in R rotator tear. Patient underwent #1 right shoulder arthroscopic double row rotator cuff repair  #2 right shoulder arthroscopic biceps tenodesis  #3 right shoulder arthroscopic major joint debridement of labral tear biceps tear subscapular fraying with significant joint synovitis  #4 right shoulder arthroscopic anterior acromioplasty extensive decompression and bursectomy for severe subacromial bursitis with impingement lesion        Referred by: Dr. Vegas, next follow up  7/28/25 no more sling wear, begin MMS now    Patient reports \" my neck is pretty much sore all of the time\".Pt did report slipping while on vacation and catching self with her operated arm but no injury.   Pt  Took  ibupfrofen the last  1- 2 of 7( was 2 of 7 )  Goals and " "precautions reviewed with patient. She verbalized understanding.   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     Precautions:   0-2 weeks: AAROM R elbow/FA  5/7/25  0-6 weeks: PROM R shoulder   6-12 weeks: AROM R shoulder 6/4/25  12+ weeks: Strengthening 7/16/25-Current    Performing HEP?: Yes    Pain: 1-2/10 ( was 4/10) in tricep and front  of capsule ,\" Achy\"  Objective   Upper Extremity ROM                AAROM standing   ( Dowel)  PROM MMT MMT       Right Left Right Left Right Left   SHOULDER Flexion 165 ( was 90 at eval) supine  WFL NT WFL     Extension 65( was neutral at eval) WFL  WFL NT WFL     Abduction 165  ( was 60 at eval) supine WFL  WFL NT WFL     Internal Rotation Mid/upper  thoracic in standing( was 10 at eval)  WFL  WFL NT WFL     External Rotation 78 degrees (Supine  with dowel matthew)  Was 10 degrees at eval   WFL  WFL NT WFL   AAROM   ELBOW Extension -3 ( was -20 at eval) WFL 0 WFL NT WFL     Flexion  144( was 120 at eval)    WFL NT WFL   AAROM  FOREARM Pronation 90 WFL 90 WFL NT WFL     Supination 78 ( was 60 at eval) WFL 90   WFL NT WFL      WRIST Flexion WFL WFL WFL WFL NT WFL     Extension WFL WFL WFL WFL NT WFL     Radial Deviation WFL WFL WFL WFL         Ulnar Deviation WFL WFL WFL WFL       Physical Observation: healed softening scars.    Edema: none   Sensory: impaired  Numbness/Tingling:  noted sometimes in fingers  Treatment:  Reviewed scar massage. Pt following through daily  Modalities:  Moist heat applied x 5 min at start of session during goal review to promote muscle movement during session  Ice applied x 5 min at end of session during wrap up to reduce edema and inflammation.   Reviewed benefit of biofreeze use for pain relief             Therapeutic Exercise:  UBE seated  at level 2.0 ( was without resistance) x6 minutes; alternating directions each minute.   Wall washes with RUE  with added 1# for shoulder flexion and CW/CCW  1 set x  10  reps each.  Reviewed " Overhead pulleys for shoulder flexion and abduction x 3 minutes, completing as part of HEP  Supine scapular protraction/retraction , tricep punches, abduction and  2 x 10 reps  Completed side lying ER with added 1#  1 x 10 reps  Completed standing dowel matthew  for IR, and extension x 10 reps each.  Initiated standing IR  stretch with  pulleys x 10 reps with 5 sec hold. Added to HEP    Post-tx pain: 2/10, unchanged since start of session    Assessment/Plan Pt motivated to return to function. Noted good tolerance to added low resistance.   Pt to continue with modality use, scar massage .   Pt to continue with  supine 1#  and standing dowel matthew HEP , seated pulleys , increasing reps to  as tolerated for pain. Plan to progress patient according to protocol and as tolerated for pain.  OP EDUCATION:  Education  Individual(s) Educated: Patient  Education Provided: Risk and benefits of OT discussed with patient or other, POC discussed and agreed upon  Home Program: AAROM, AROM, Modalities, Strengthening  Equipment: Other, Pulleys (1# free weight)  Risk and Benefits Discussed with Patient/Caregiver/Other: yes  Patient/Caregiver Demonstrated Understanding: yes  Plan of Care Discussed and Agreed Upon: yes  Patient Response to Education: Patient/Caregiver Verbalized Understanding of Information, Patient/Caregiver Performed Return Demonstration of Exercises/Activities, Patient/Caregiver Asked Appropriate Questions    Goals:  Active       OT Goals       1. Patient will increase R shoulder PROM flexion and abduction to 130 degrees for improved ease of ADL's. (Met)       Start:  04/25/25    Expected End:  06/06/25    Resolved:  04/30/25         2. Patient will increase R shoulder PROM ER to 60 degrees for increased ease of ADL's. (Met)       Start:  04/25/25    Expected End:  06/06/25    Resolved:  04/30/25         3. Patient will increase R shoulder AROM flexion and abduction to 130 degrees for improved ease of ADL's. (Met)        Start:  04/25/25    Expected End:  08/15/25    Resolved:  06/25/25         4. Patient will increase R shoulder AROM ER to 60 degrees for increased ease of ADL's. (Met)       Start:  04/25/25    Expected End:  08/15/25    Resolved:  06/25/25         5. 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 Patient will increase overall functional ease and independence AEB UEFI score of at least 65/80 for improved quality of life for patient. (Progressing)       Start:  04/25/25    Expected End:  08/15/25

## 2025-07-28 ENCOUNTER — APPOINTMENT (OUTPATIENT)
Dept: ORTHOPEDIC SURGERY | Facility: CLINIC | Age: 60
End: 2025-07-28
Payer: MEDICARE

## 2025-07-28 DIAGNOSIS — M75.101 TEAR OF RIGHT ROTATOR CUFF, UNSPECIFIED TEAR EXTENT, UNSPECIFIED WHETHER TRAUMATIC: Primary | ICD-10-CM

## 2025-07-28 PROCEDURE — 1036F TOBACCO NON-USER: CPT | Performed by: ORTHOPAEDIC SURGERY

## 2025-07-28 PROCEDURE — 99213 OFFICE O/P EST LOW 20 MIN: CPT | Performed by: ORTHOPAEDIC SURGERY

## 2025-07-28 ASSESSMENT — ENCOUNTER SYMPTOMS
SHORTNESS OF BREATH: 0
TROUBLE SWALLOWING: 0
WOUND: 0
SINUS PRESSURE: 0
WHEEZING: 0
EYE DISCHARGE: 0
FEVER: 0
ARTHRALGIAS: 1
JOINT SWELLING: 0

## 2025-07-28 ASSESSMENT — PAIN - FUNCTIONAL ASSESSMENT: PAIN_FUNCTIONAL_ASSESSMENT: 0-10

## 2025-07-28 ASSESSMENT — PAIN SCALES - GENERAL: PAINLEVEL_OUTOF10: 1

## 2025-07-28 NOTE — PROGRESS NOTES
Reason for Appointment  Chief Complaint   Patient presents with    Right Shoulder - Follow-up     History of Present Illness  Patient is a 60 y.o. female here today for follow-up evaluation of 3 and half months status post a right shoulder arthroscopic rotator cuff repair and biceps tenodesis.  She is doing very well, she has finished formal therapy.  She has great motion, she was recently on a trip to [place] when she slipped and did have a slight hyperextension to the right shoulder but she still has good motion and function.  She has had some slight increased pain since the injury.  No other changes in her past medical history, allergies, or medications.    Medical History[1]    Surgical History[2]    Medication Documentation Review Audit       Reviewed by Deborah Childs MA (Medical Assistant) on 07/28/25 at 0924      Medication Order Taking? Sig Documenting Provider Last Dose Status   acetaminophen (TylenoL) 325 mg tablet [id number] Yes Take 2 tablets (650 mg) by mouth every 8 hours if needed for moderate pain (4 - 6) or mild pain (1 - 3). Historical Provider, MD  Active   albuterol (Ventolin HFA) 90 mcg/actuation inhaler [id number] Yes Inhale 2 puffs every 6 hours if needed for shortness of breath or wheezing. Historical Provider, MD  Active   ASPIRIN-ACETAMINOPHEN-CAFFEINE ORAL [id number] Yes Take 2 tablets by mouth 2 times a day as needed for headaches. Historical Provider, MD  Active   calcium citrate-vitamin D3 500 mg-12.5 mcg (500 unit) tablet,chewable [id number] Yes Chew 1 tablet 2 times a day. Historical Provider, MD  Active   cyanocobalamin (Vitamin B-12) 100 mcg tablet [id number] Yes Take 1 tablet (100 mcg) by mouth once daily in the morning. Historical Provider, MD  Active   DULoxetine (Cymbalta) 60 mg DR capsule [id number] Yes Take 1 capsule (60 mg) by mouth once daily. Do not crush or chew. Dariana Godoy MD  Active   minoxidil (Loniten) 2.5 mg tablet [id number] Yes Take 1 tablet (2.5 mg) by mouth once  daily. AV Lee  Active   multivit-minerals/folic acid (ADULT ONE DAILY MULTIVITAMIN ORAL) [id number] Yes Take by mouth once daily in the morning. As directed Historical Provider, MD  Active   rOPINIRole (Requip) 0.25 mg tablet [id number] Yes Take 1 tablet nightly prior to bed for restless legs; if symptoms not improved can take a second tablet nightly Dariana Godoy MD  Active   spironolactone (Aldactone) 50 mg tablet [id number] Yes Take 1 tablet (50 mg) by mouth once daily. AV Lee  Active   tirzepatide, weight loss, (Zepbound) 7.5 mg/0.5 mL injection [id number] Yes Inject 7.5 mg under the skin every 7 days. Dariana Godoy MD  Active   traZODone (Desyrel) 50 mg tablet [id number] Yes Take half to one tablet by mouth about 30 minutes before bedtime as needed for sleep Dariana Godoy MD  Active   zolpidem (Ambien) 5 mg tablet [id number] Yes Take 1 tablet by mouth about 30 minutes prior to bed; can take a second tablet if does not assist with sleep Dariana Godoy MD  Active                    RX Allergies[3]    Review of Systems   Constitutional:  Negative for fever.   HENT:  Negative for mouth sores, sinus pressure and trouble swallowing.    Eyes:  Negative for discharge.   Respiratory:  Negative for shortness of breath and wheezing.    Cardiovascular:  Negative for chest pain.   Musculoskeletal:  Positive for arthralgias. Negative for joint swelling.   Skin:  Negative for pallor and wound.   All other systems reviewed and are negative.    Exam   On exam she has full active forward flexion of the right shoulder.  She has good cuff strength with resisted external rotation.  Deltoid is functional.  Good pulses and sensation in the upper extremity    Assessment   Right rotator cuff tear    Plan   She is doing extremely well, having minimal pain in the shoulder today even after her recent injury.  She still has good cuff strength today and she understands slowly increasing activity and  lifting over the next few months.  She will be careful with this arm, we be happy to see her back at any point for any further issues.    I, Adriana Grullon PA-C, am acting as a scribe and attest that this documentation has been prepared under the direction and in the presence of Armani Vegas MD.  By signing below, I, Armani Vegas MD, personally performed the services described in this documentation. All medical record entries made by the scribe were at my direction and in my presence. I have reviewed the chart and agree that the record reflects my personal performance and is accurate and complete.                         [1]   Past Medical History:  Diagnosis Date    Allergic     Anxiety     Arthritis     Asthma     Cluster headache ?    Depression     Joint pain ?    Low back pain ?    Migraine ?    Neck pain ?    Obesity     Osteoarthritis ?    Rotator cuff arthropathy 04/23/2025    Dr Vegas    Sleep apnea     no longer uses CPAP after bariatric surgery    Spinal stenosis ?   [2]   Past Surgical History:  Procedure Laterality Date    BARIATRIC SURGERY      BUNIONECTOMY      CHOLECYSTECTOMY      COLONOSCOPY      EPIDURAL BLOCK INJECTION  ?    KNEE CARTILAGE SURGERY Right     UPPER GASTROINTESTINAL ENDOSCOPY     [3]   Allergies  Allergen Reactions    House Dust Mite Other     congested, stuffy    Mold Other     congested, stuffy    Pollen Extracts Unknown

## 2025-07-30 ENCOUNTER — DOCUMENTATION (OUTPATIENT)
Facility: CLINIC | Age: 60
End: 2025-07-30
Payer: MEDICARE

## 2025-07-30 ENCOUNTER — APPOINTMENT (OUTPATIENT)
Facility: CLINIC | Age: 60
End: 2025-07-30
Payer: MEDICARE

## 2025-07-30 NOTE — PROGRESS NOTES
Occupational Therapy                 Therapy Communication Note    Patient Name: Arin Phan  MRN: [id number]  Department:   Room: Room/bed info not found  Today's Date: 7/30/2025     Discipline: Occupational Therapy    Missed Visit Reason:  Patient cancelled via my chart. No reason provided.     Missed Time: Cancel

## 2025-08-04 ENCOUNTER — DOCUMENTATION (OUTPATIENT)
Facility: CLINIC | Age: 60
End: 2025-08-04

## 2025-08-04 ENCOUNTER — APPOINTMENT (OUTPATIENT)
Dept: ORTHOPEDIC SURGERY | Facility: CLINIC | Age: 60
End: 2025-08-04
Payer: MEDICARE

## 2025-08-04 NOTE — PROGRESS NOTES
Occupational Therapy                 Therapy Communication Note    Patient Name: Arin Phan  MRN: [id number]  Department:   Room: Room/bed info not found  Today's Date: 8/4/2025     Discipline: Occupational Therapy    Missed Visit Reason:  Patient cancelled via my chart. No reason provided    Missed Time: Cancel

## 2025-08-06 ENCOUNTER — APPOINTMENT (OUTPATIENT)
Dept: NEUROLOGY | Facility: CLINIC | Age: 60
End: 2025-08-06
Payer: MEDICARE

## 2025-08-06 VITALS
HEART RATE: 98 BPM | DIASTOLIC BLOOD PRESSURE: 74 MMHG | WEIGHT: 197.97 LBS | BODY MASS INDEX: 33.8 KG/M2 | SYSTOLIC BLOOD PRESSURE: 109 MMHG | HEIGHT: 64 IN | RESPIRATION RATE: 18 BRPM

## 2025-08-06 DIAGNOSIS — G47.30 SLEEP APNEA, UNSPECIFIED TYPE: Primary | ICD-10-CM

## 2025-08-06 DIAGNOSIS — R51.9 CHRONIC DAILY HEADACHE: ICD-10-CM

## 2025-08-06 PROCEDURE — 99204 OFFICE O/P NEW MOD 45 MIN: CPT | Performed by: PSYCHIATRY & NEUROLOGY

## 2025-08-06 PROCEDURE — 3008F BODY MASS INDEX DOCD: CPT | Performed by: PSYCHIATRY & NEUROLOGY

## 2025-08-06 PROCEDURE — 1036F TOBACCO NON-USER: CPT | Performed by: PSYCHIATRY & NEUROLOGY

## 2025-08-06 RX ORDER — TOPIRAMATE 25 MG/1
25 TABLET, FILM COATED ORAL DAILY
Qty: 30 TABLET | Refills: 2 | Status: SHIPPED | OUTPATIENT
Start: 2025-08-06 | End: 2026-08-06

## 2025-08-06 RX ORDER — RIZATRIPTAN BENZOATE 10 MG/1
10 TABLET ORAL ONCE AS NEEDED
Qty: 9 TABLET | Refills: 0 | Status: SHIPPED | OUTPATIENT
Start: 2025-08-06 | End: 2025-09-05

## 2025-08-06 ASSESSMENT — PAIN SCALES - GENERAL: PAINLEVEL_OUTOF10: 4

## 2025-08-06 NOTE — LETTER
August 6, 2025     No Recipients    Patient: Arin Phan   YOB: 1965   Date of Visit: 8/6/2025       Dear Dr. Benton Recipients:    Thank you for referring Arin Pahn to me for evaluation. Below are my notes for this consultation.  If you have questions, please do not hesitate to call me. I look forward to following your patient along with you.       Sincerely,     Kati Gray MD      CC: No Recipients  ______________________________________________________________________________________    Assessment/Plan:  Chronic daily headache: differential includes medication overuse headache, sleep apnea and migraine.  Will continue to wean Excedrin, get sleep apnea test to see if she still has this with her weight loss. Will start topiramate as a prophylactic.    History of sleep apnea: unclear if she still has it after her large weight loss.  Will retest for sleep apnea.  Medication overuse headache: continue to wean her excedrin.    Orders:  Orders Placed This Encounter   Procedures   • In-Center Sleep Study       HPI:   Arin Phan is a 60 y.o.  female with history of headaches since age 31s.  She was previously, diagnosed with sleep apnea but hasn't used CPAP in years.  She has lost 123 pounds following gastric surgery and is currently on Zepbound.  She is not sure is she still has sleep apnea after losing her weight.  She has been having headaches that are there when she wakes up and persist for several hours to a few days.    Location: right frontal over the eye  Frequency/Duration: every morning, last several hours can be until later afternoon or even a few days.    Photophobia: occasionally; Phonophobia: yes; Nausea: yes; Vomiting: no  Aura: not with migraine, but also has ocular migraine  Focal Symptoms: no  Treatment: excedrin (previously 4 tabs twice daily, now 2-4 tabs 4-5x a week, sumatriptan, never took a prophylactic medication  Alleviating measures: none  Excerbating factors:  none  Triggers: none  Caffeine: none  Exercise: none  Sleep: with ambien 6-7 hours per night, interrupted  Menses: none    Medications Ordered Prior to Encounter[1]     Problem List[2]     Surgical History[3]     RX Allergies[4]     Family History[5]     Social History[6]    ROS:    General: Feeling Fatigued   Vision: Eyesight Problems   ENT: Ringing in the Ears   Pulm: No symptoms reported  Cardiac: No symptoms reported  GI: Abdominal Pain   Urology: Urniary Frequency   Rheum: Joint Stiffness  and Back Pain   Derm: No symptoms reported  Neuro: Headache  and Dizziness   Psych: Memory Loss  and Sleep Problems   Endo: Excessive Thirst  and Dry skin   Hem-Onc: Easy Bruising      Objective:    Visit Vitals  /74 (BP Location: Right arm, Patient Position: Sitting, BP Cuff Size: Large adult)   Pulse 98   Resp 18        Neurologic Exam:    Well-developed, well-nourished, in no apparent distress. HEENT: normocephalic, atraumatic. No pharyngeal erythema. Neck supple. No carotid bruits. Chest clear to auscultation. No wheezes, rales or rhonchi. Cardiac exam regular rate and rhythm. Normal S1, S2 no rubs, murmurs or gallops. Abdomen nontender, nondistended, positive bowel sounds. Extremities no cyanosis, clubbing, or edema.    Neurological exam: Alert attentive with normal language, orientation, and speech. Reasonable fund of knowledge and memory (recent and remote). Funduscopic exam shows no evidence of papilledema. Pupils equal, round, reactive to light. Visual fields are full to confrontation.  Extraocular eye movements are intact without nystagmus. V1 to 3 is intact to light touch. The face is symmetric.  Hearing is intact to soft voice.  Palate elevates symmetrically. Sternocleidomastoid and trapezius muscles are 5 out of 5 and the tongue is midline. Motor exam: 5 out of 5 throughout normal bulk and tone. No drift on [place]. No orbiting and normal fine finger movements.  Sensory exam: intact to light touch, pinprick,  temperature, joint position sense and vibration. Deep tendon reflexes: +1 throughout symmetric. Plantar responses are flexor bilaterally. Cerebellar exam: no evidence of ataxia on finger-nose-finger or heel-knee-shin maneuver. Casual gait and station are normal.     The chart was reviewed and summarized as above.  Neuroimaging was personally reviewed and interpreted as documented in the HPI or Assessment        [1]  Current Outpatient Medications on File Prior to Visit   Medication Sig Dispense Refill   • acetaminophen (TylenoL) 325 mg tablet Take 2 tablets (650 mg) by mouth every 8 hours if needed for moderate pain (4 - 6) or mild pain (1 - 3).     • albuterol (Ventolin HFA) 90 mcg/actuation inhaler Inhale 2 puffs every 6 hours if needed for shortness of breath or wheezing.     • ASPIRIN-ACETAMINOPHEN-CAFFEINE ORAL Take 2 tablets by mouth 2 times a day as needed for headaches.     • calcium citrate-vitamin D3 500 mg-12.5 mcg (500 unit) tablet,chewable Chew 1 tablet 2 times a day.     • cyanocobalamin (Vitamin B-12) 100 mcg tablet Take 1 tablet (100 mcg) by mouth once daily in the morning.     • DULoxetine (Cymbalta) 60 mg DR capsule Take 1 capsule (60 mg) by mouth once daily. Do not crush or chew. 90 capsule 3   • minoxidil (Loniten) 2.5 mg tablet Take 1 tablet (2.5 mg) by mouth once daily. 30 tablet 5   • multivit-minerals/folic acid (ADULT ONE DAILY MULTIVITAMIN ORAL) Take by mouth once daily in the morning. As directed     • rOPINIRole (Requip) 0.25 mg tablet Take 1 tablet nightly prior to bed for restless legs; if symptoms not improved can take a second tablet nightly 30 tablet 2   • spironolactone (Aldactone) 50 mg tablet Take 1 tablet (50 mg) by mouth once daily. 30 tablet 5   • tirzepatide, weight loss, (Zepbound) 7.5 mg/0.5 mL injection Inject 7.5 mg under the skin every 7 days. 2 mL 11   • traZODone (Desyrel) 50 mg tablet Take half to one tablet by mouth about 30 minutes before bedtime as needed for sleep  90 tablet 3   • zolpidem (Ambien) 5 mg tablet Take 1 tablet by mouth about 30 minutes prior to bed; can take a second tablet if does not assist with sleep 60 tablet 0     No current facility-administered medications on file prior to visit.   [2]  Patient Active Problem List  Diagnosis   • Arthritis of left acromioclavicular joint   • Cervical nerve root impingement   • Chronic rhinitis   • DDD (degenerative disc disease), cervical   • Spondylosis without myelopathy or radiculopathy, cervical region   • Depression with anxiety   • Grief   • Intestinal malabsorption   • Hyperparathyroidism (Multi)   • Morbid (severe) obesity due to excess calories (Multi)   • Obstructive sleep apnea syndrome   • Ocular migraine   • Other chronic pain   • Primary osteoarthritis, right shoulder   • Overflow incontinence   • Stress incontinence (female) (male)   • Tinnitus of both ears   • Vitamin D deficiency   • Low back pain   • Osteoarthritis of knee   • Osteoarthritis   • Primary osteoarthritis of right knee   • Psychological stress   • Radial tunnel syndrome   • Restless leg   • Sleep apnea   • Complete tear of right rotator cuff   • Elevated LDL cholesterol level   • Primary insomnia   [3]  Past Surgical History:  Procedure Laterality Date   • BARIATRIC SURGERY     • BUNIONECTOMY     • CHOLECYSTECTOMY     • COLONOSCOPY     • EPIDURAL BLOCK INJECTION  ?   • KNEE CARTILAGE SURGERY Right    • UPPER GASTROINTESTINAL ENDOSCOPY     [4]  Allergies  Allergen Reactions   • House Dust Mite Other     congested, stuffy   • Mold Other     congested, stuffy   • Pollen Extracts Unknown   [5]  Family History  Problem Relation Name Age of Onset   • Heart disease Mother 81    • Coronary artery disease Mother 81    • Other (s/p cabg) Mother 81    • Hypertension Father 81    • Obesity Father 81    • Atrial fibrillation Father 81    • Heart failure Father 81    • Other (CRF) Father 81    • Arthritis Father 81    • Alcohol abuse Father 81    • Diabetes  Father 81    • Other (CVA) Brother 59    • Stroke Brother 59    • Breast cancer Maternal Grandmother Luci joseph    • Migraines Maternal Grandmother Luci joseph    • Breast cancer Paternal Grandmother     [6]  Social History  Tobacco Use   • Smoking status: Never     Passive exposure: Never   • Smokeless tobacco: Never   Vaping Use   • Vaping status: Never Used   Substance Use Topics   • Alcohol use: Not Currently     Comment: Has a drink every now and then   • Drug use: Never

## 2025-08-06 NOTE — PROGRESS NOTES
Assessment/Plan:  Chronic daily headache: differential includes medication overuse headache, sleep apnea and migraine.  Will continue to wean Excedrin, get sleep apnea test to see if she still has this with her weight loss. Will start topiramate as a prophylactic.    History of sleep apnea: unclear if she still has it after her large weight loss.  Will retest for sleep apnea.  Medication overuse headache: continue to wean her excedrin.    Orders:  Orders Placed This Encounter   Procedures    In-Center Sleep Study       HPI:   Arin Phan is a 60 y.o.  female with history of headaches since age 31s.  She was previously, diagnosed with sleep apnea but hasn't used CPAP in years.  She has lost 123 pounds following gastric surgery and is currently on Zepbound.  She is not sure is she still has sleep apnea after losing her weight.  She has been having headaches that are there when she wakes up and persist for several hours to a few days.    Location: right frontal over the eye  Frequency/Duration: every morning, last several hours can be until later afternoon or even a few days.    Photophobia: occasionally; Phonophobia: yes; Nausea: yes; Vomiting: no  Aura: not with migraine, but also has ocular migraine  Focal Symptoms: no  Treatment: excedrin (previously 4 tabs twice daily, now 2-4 tabs 4-5x a week, sumatriptan, never took a prophylactic medication  Alleviating measures: none  Excerbating factors: none  Triggers: none  Caffeine: none  Exercise: none  Sleep: with ambien 6-7 hours per night, interrupted  Menses: none    Medications Ordered Prior to Encounter[1]     Problem List[2]     Surgical History[3]     RX Allergies[4]     Family History[5]     Social History[6]    ROS:    General: Feeling Fatigued   Vision: Eyesight Problems   ENT: Ringing in the Ears   Pulm: No symptoms reported  Cardiac: No symptoms reported  GI: Abdominal Pain   Urology: Urniary Frequency   Rheum: Joint Stiffness  and Back Pain   Derm: No  symptoms reported  Neuro: Headache  and Dizziness   Psych: Memory Loss  and Sleep Problems   Endo: Excessive Thirst  and Dry skin   Hem-Onc: Easy Bruising      Objective:    Visit Vitals  /74 (BP Location: Right arm, Patient Position: Sitting, BP Cuff Size: Large adult)   Pulse 98   Resp 18        Neurologic Exam:    Well-developed, well-nourished, in no apparent distress. HEENT: normocephalic, atraumatic. No pharyngeal erythema. Neck supple. No carotid bruits. Chest clear to auscultation. No wheezes, rales or rhonchi. Cardiac exam regular rate and rhythm. Normal S1, S2 no rubs, murmurs or gallops. Abdomen nontender, nondistended, positive bowel sounds. Extremities no cyanosis, clubbing, or edema.    Neurological exam: Alert attentive with normal language, orientation, and speech. Reasonable fund of knowledge and memory (recent and remote). Funduscopic exam shows no evidence of papilledema. Pupils equal, round, reactive to light. Visual fields are full to confrontation.  Extraocular eye movements are intact without nystagmus. V1 to 3 is intact to light touch. The face is symmetric.  Hearing is intact to soft voice.  Palate elevates symmetrically. Sternocleidomastoid and trapezius muscles are 5 out of 5 and the tongue is midline. Motor exam: 5 out of 5 throughout normal bulk and tone. No drift on [place]. No orbiting and normal fine finger movements.  Sensory exam: intact to light touch, pinprick, temperature, joint position sense and vibration. Deep tendon reflexes: +1 throughout symmetric. Plantar responses are flexor bilaterally. Cerebellar exam: no evidence of ataxia on finger-nose-finger or heel-knee-shin maneuver. Casual gait and station are normal.     The chart was reviewed and summarized as above.  Neuroimaging was personally reviewed and interpreted as documented in the HPI or Assessment        [1]   Current Outpatient Medications on File Prior to Visit   Medication Sig Dispense Refill    acetaminophen  (TylenoL) 325 mg tablet Take 2 tablets (650 mg) by mouth every 8 hours if needed for moderate pain (4 - 6) or mild pain (1 - 3).      albuterol (Ventolin HFA) 90 mcg/actuation inhaler Inhale 2 puffs every 6 hours if needed for shortness of breath or wheezing.      ASPIRIN-ACETAMINOPHEN-CAFFEINE ORAL Take 2 tablets by mouth 2 times a day as needed for headaches.      calcium citrate-vitamin D3 500 mg-12.5 mcg (500 unit) tablet,chewable Chew 1 tablet 2 times a day.      cyanocobalamin (Vitamin B-12) 100 mcg tablet Take 1 tablet (100 mcg) by mouth once daily in the morning.      DULoxetine (Cymbalta) 60 mg DR capsule Take 1 capsule (60 mg) by mouth once daily. Do not crush or chew. 90 capsule 3    minoxidil (Loniten) 2.5 mg tablet Take 1 tablet (2.5 mg) by mouth once daily. 30 tablet 5    multivit-minerals/folic acid (ADULT ONE DAILY MULTIVITAMIN ORAL) Take by mouth once daily in the morning. As directed      rOPINIRole (Requip) 0.25 mg tablet Take 1 tablet nightly prior to bed for restless legs; if symptoms not improved can take a second tablet nightly 30 tablet 2    spironolactone (Aldactone) 50 mg tablet Take 1 tablet (50 mg) by mouth once daily. 30 tablet 5    tirzepatide, weight loss, (Zepbound) 7.5 mg/0.5 mL injection Inject 7.5 mg under the skin every 7 days. 2 mL 11    traZODone (Desyrel) 50 mg tablet Take half to one tablet by mouth about 30 minutes before bedtime as needed for sleep 90 tablet 3    zolpidem (Ambien) 5 mg tablet Take 1 tablet by mouth about 30 minutes prior to bed; can take a second tablet if does not assist with sleep 60 tablet 0     No current facility-administered medications on file prior to visit.   [2]   Patient Active Problem List  Diagnosis    Arthritis of left acromioclavicular joint    Cervical nerve root impingement    Chronic rhinitis    DDD (degenerative disc disease), cervical    Spondylosis without myelopathy or radiculopathy, cervical region    Depression with anxiety    Grief     Intestinal malabsorption    Hyperparathyroidism (Multi)    Morbid (severe) obesity due to excess calories (Multi)    Obstructive sleep apnea syndrome    Ocular migraine    Other chronic pain    Primary osteoarthritis, right shoulder    Overflow incontinence    Stress incontinence (female) (male)    Tinnitus of both ears    Vitamin D deficiency    Low back pain    Osteoarthritis of knee    Osteoarthritis    Primary osteoarthritis of right knee    Psychological stress    Radial tunnel syndrome    Restless leg    Sleep apnea    Complete tear of right rotator cuff    Elevated LDL cholesterol level    Primary insomnia   [3]   Past Surgical History:  Procedure Laterality Date    BARIATRIC SURGERY      BUNIONECTOMY      CHOLECYSTECTOMY      COLONOSCOPY      EPIDURAL BLOCK INJECTION  ?    KNEE CARTILAGE SURGERY Right     UPPER GASTROINTESTINAL ENDOSCOPY     [4]   Allergies  Allergen Reactions    House Dust Mite Other     congested, stuffy    Mold Other     congested, stuffy    Pollen Extracts Unknown   [5]   Family History  Problem Relation Name Age of Onset    Heart disease Mother 81     Coronary artery disease Mother 81     Other (s/p cabg) Mother 81     Hypertension Father 81     Obesity Father 81     Atrial fibrillation Father 81     Heart failure Father 81     Other (CRF) Father 81     Arthritis Father 81     Alcohol abuse Father 81     Diabetes Father 81     Other (CVA) Brother 59     Stroke Brother 59     Breast cancer Maternal Grandmother Luci joseph     Migraines Maternal Grandmother Luci joseph     Breast cancer Paternal Grandmother     [6]   Social History  Tobacco Use    Smoking status: Never     Passive exposure: Never    Smokeless tobacco: Never   Vaping Use    Vaping status: Never Used   Substance Use Topics    Alcohol use: Not Currently     Comment: Has a drink every now and then    Drug use: Never

## 2025-08-06 NOTE — PATIENT INSTRUCTIONS
Keep a calendar of your migraines.    Wear hair loose.      Exercise regularly, get plenty of fresh air and regular, sleep.    Preventative medication should be taken every day regardless of whether or not, you have a headache.  It is to prevent headaches.    Medication to treat the actual headache should be taken as soon as you realize you have a headache.    Medications such as Maxalt, Imitrex, Relpax, Zomig, can be taken only twice a day and they're only nine pills per month.  If you have frequent headaches may need to save these for the worst headaches.    Do not take pain medication every day.  Your body can become addicted to this medication and may make the headaches worse.  If you are having headaches every day, we need to increase the preventative medication, call me if this is becoming a problem.    Avoid foods that can cause migraines.  These include: Red wine, MSG, nitrates (hot dogs, or lunch meats), caffeine, chocolate, cheese.    Monosodium Glutamate (MSG)  MSG also known as Monosodium glutamate is a common food additive. MSG is used to make food taste better, and can be found in processed products, fast food, and canned soups. MSG can cause headaches when consumed in large amounts.    Fast Foods that Contain MSG  Ch[employer] Chicken [place]   [employer]'s Extra Crispy Chicken Breast    Chips  (Will be listed on ingredient list Monosodium Glutamate)  Doritos  Pringles  Potato Chips    Meats  Hot dogs    Lunch Meats  Beef jerky    Sausages  Smoked meats    Pepperoni  Meat snack sticks    Torrez  Patrasmi    Hamburgers  Cold cuts    Salami  Fried Chicken      Chicken Nuggets  Burgers    Sauces  Ketchup    Mayonnaise  Barbecue sauce   Salad dressing  Soy sauce    Mustard    Miscellaneous  Bodybuilding protein powder  Instant Ramen  Spices    Items that cause headaches without MSG  Alcohol (red wine, beer, whiskey, Scotch, and champagne )  Cheese (Aged Cheeses: including Parmesan, Swiss,  Alysia Dietrich    I can be reached at phone: [phone] or email: [email or web address] or message me on [employer]

## 2025-08-06 NOTE — LETTER
August 6, 2025     No Recipients    Patient: Arin Phan   YOB: 1965   Date of Visit: 8/6/2025       Dear Dr. Benton Recipients:    Thank you for referring Arin Phan to me for evaluation. Below are my notes for this consultation.  If you have questions, please do not hesitate to call me. I look forward to following your patient along with you.       Sincerely,     Kati Gray MD      CC: No Recipients  ______________________________________________________________________________________    Assessment/Plan:  Chronic daily headache: differential includes medication overuse headache, sleep apnea and migraine.  Will continue to wean Excedrin, get sleep apnea test to see if she still has this with her weight loss. Will start topiramate as a prophylactic.    History of sleep apnea: unclear if she still has it after her large weight loss.  Will retest for sleep apnea.  Medication overuse headache: continue to wean her excedrin.    Orders:  Orders Placed This Encounter   Procedures   • In-Center Sleep Study       HPI:   Arin Phan is a 60 y.o.  female with history of headaches since age 31s.  She was previously, diagnosed with sleep apnea but hasn't used CPAP in years.  She has lost 123 pounds following gastric surgery and is currently on Zepbound.  She is not sure is she still has sleep apnea after losing her weight.  She has been having headaches that are there when she wakes up and persist for several hours to a few days.    Location: right frontal over the eye  Frequency/Duration: every morning, last several hours can be until later afternoon or even a few days.    Photophobia: occasionally; Phonophobia: yes; Nausea: yes; Vomiting: no  Aura: not with migraine, but also has ocular migraine  Focal Symptoms: no  Treatment: excedrin (previously 4 tabs twice daily, now 2-4 tabs 4-5x a week, sumatriptan, never took a prophylactic medication  Alleviating measures: none  Excerbating factors:  none  Triggers: none  Caffeine: none  Exercise: none  Sleep: with ambien 6-7 hours per night, interrupted  Menses: none    Medications Ordered Prior to Encounter[1]     Problem List[2]     Surgical History[3]     RX Allergies[4]     Family History[5]     Social History[6]    ROS:    General: Feeling Fatigued   Vision: Eyesight Problems   ENT: Ringing in the Ears   Pulm: No symptoms reported  Cardiac: No symptoms reported  GI: Abdominal Pain   Urology: Urniary Frequency   Rheum: Joint Stiffness  and Back Pain   Derm: No symptoms reported  Neuro: Headache  and Dizziness   Psych: Memory Loss  and Sleep Problems   Endo: Excessive Thirst  and Dry skin   Hem-Onc: Easy Bruising      Objective:    Visit Vitals  /74 (BP Location: Right arm, Patient Position: Sitting, BP Cuff Size: Large adult)   Pulse 98   Resp 18        Neurologic Exam:    Well-developed, well-nourished, in no apparent distress. HEENT: normocephalic, atraumatic. No pharyngeal erythema. Neck supple. No carotid bruits. Chest clear to auscultation. No wheezes, rales or rhonchi. Cardiac exam regular rate and rhythm. Normal S1, S2 no rubs, murmurs or gallops. Abdomen nontender, nondistended, positive bowel sounds. Extremities no cyanosis, clubbing, or edema.    Neurological exam: Alert attentive with normal language, orientation, and speech. Reasonable fund of knowledge and memory (recent and remote). Funduscopic exam shows no evidence of papilledema. Pupils equal, round, reactive to light. Visual fields are full to confrontation.  Extraocular eye movements are intact without nystagmus. V1 to 3 is intact to light touch. The face is symmetric.  Hearing is intact to soft voice.  Palate elevates symmetrically. Sternocleidomastoid and trapezius muscles are 5 out of 5 and the tongue is midline. Motor exam: 5 out of 5 throughout normal bulk and tone. No drift on [place]. No orbiting and normal fine finger movements.  Sensory exam: intact to light touch, pinprick,  temperature, joint position sense and vibration. Deep tendon reflexes: +1 throughout symmetric. Plantar responses are flexor bilaterally. Cerebellar exam: no evidence of ataxia on finger-nose-finger or heel-knee-shin maneuver. Casual gait and station are normal.     The chart was reviewed and summarized as above.  Neuroimaging was personally reviewed and interpreted as documented in the HPI or Assessment        [1]  Current Outpatient Medications on File Prior to Visit   Medication Sig Dispense Refill   • acetaminophen (TylenoL) 325 mg tablet Take 2 tablets (650 mg) by mouth every 8 hours if needed for moderate pain (4 - 6) or mild pain (1 - 3).     • albuterol (Ventolin HFA) 90 mcg/actuation inhaler Inhale 2 puffs every 6 hours if needed for shortness of breath or wheezing.     • ASPIRIN-ACETAMINOPHEN-CAFFEINE ORAL Take 2 tablets by mouth 2 times a day as needed for headaches.     • calcium citrate-vitamin D3 500 mg-12.5 mcg (500 unit) tablet,chewable Chew 1 tablet 2 times a day.     • cyanocobalamin (Vitamin B-12) 100 mcg tablet Take 1 tablet (100 mcg) by mouth once daily in the morning.     • DULoxetine (Cymbalta) 60 mg DR capsule Take 1 capsule (60 mg) by mouth once daily. Do not crush or chew. 90 capsule 3   • minoxidil (Loniten) 2.5 mg tablet Take 1 tablet (2.5 mg) by mouth once daily. 30 tablet 5   • multivit-minerals/folic acid (ADULT ONE DAILY MULTIVITAMIN ORAL) Take by mouth once daily in the morning. As directed     • rOPINIRole (Requip) 0.25 mg tablet Take 1 tablet nightly prior to bed for restless legs; if symptoms not improved can take a second tablet nightly 30 tablet 2   • spironolactone (Aldactone) 50 mg tablet Take 1 tablet (50 mg) by mouth once daily. 30 tablet 5   • tirzepatide, weight loss, (Zepbound) 7.5 mg/0.5 mL injection Inject 7.5 mg under the skin every 7 days. 2 mL 11   • traZODone (Desyrel) 50 mg tablet Take half to one tablet by mouth about 30 minutes before bedtime as needed for sleep  90 tablet 3   • zolpidem (Ambien) 5 mg tablet Take 1 tablet by mouth about 30 minutes prior to bed; can take a second tablet if does not assist with sleep 60 tablet 0     No current facility-administered medications on file prior to visit.   [2]  Patient Active Problem List  Diagnosis   • Arthritis of left acromioclavicular joint   • Cervical nerve root impingement   • Chronic rhinitis   • DDD (degenerative disc disease), cervical   • Spondylosis without myelopathy or radiculopathy, cervical region   • Depression with anxiety   • Grief   • Intestinal malabsorption   • Hyperparathyroidism (Multi)   • Morbid (severe) obesity due to excess calories (Multi)   • Obstructive sleep apnea syndrome   • Ocular migraine   • Other chronic pain   • Primary osteoarthritis, right shoulder   • Overflow incontinence   • Stress incontinence (female) (male)   • Tinnitus of both ears   • Vitamin D deficiency   • Low back pain   • Osteoarthritis of knee   • Osteoarthritis   • Primary osteoarthritis of right knee   • Psychological stress   • Radial tunnel syndrome   • Restless leg   • Sleep apnea   • Complete tear of right rotator cuff   • Elevated LDL cholesterol level   • Primary insomnia   [3]  Past Surgical History:  Procedure Laterality Date   • BARIATRIC SURGERY     • BUNIONECTOMY     • CHOLECYSTECTOMY     • COLONOSCOPY     • EPIDURAL BLOCK INJECTION  ?   • KNEE CARTILAGE SURGERY Right    • UPPER GASTROINTESTINAL ENDOSCOPY     [4]  Allergies  Allergen Reactions   • House Dust Mite Other     congested, stuffy   • Mold Other     congested, stuffy   • Pollen Extracts Unknown   [5]  Family History  Problem Relation Name Age of Onset   • Heart disease Mother 81    • Coronary artery disease Mother 81    • Other (s/p cabg) Mother 81    • Hypertension Father 81    • Obesity Father 81    • Atrial fibrillation Father 81    • Heart failure Father 81    • Other (CRF) Father 81    • Arthritis Father 81    • Alcohol abuse Father 81    • Diabetes  Father 81    • Other (CVA) Brother 59    • Stroke Brother 59    • Breast cancer Maternal Grandmother Luci joseph    • Migraines Maternal Grandmother Luci joseph    • Breast cancer Paternal Grandmother     [6]  Social History  Tobacco Use   • Smoking status: Never     Passive exposure: Never   • Smokeless tobacco: Never   Vaping Use   • Vaping status: Never Used   Substance Use Topics   • Alcohol use: Not Currently     Comment: Has a drink every now and then   • Drug use: Never          [1]  Current Outpatient Medications on File Prior to Visit   Medication Sig Dispense Refill   • acetaminophen (TylenoL) 325 mg tablet Take 2 tablets (650 mg) by mouth every 8 hours if needed for moderate pain (4 - 6) or mild pain (1 - 3).     • albuterol (Ventolin HFA) 90 mcg/actuation inhaler Inhale 2 puffs every 6 hours if needed for shortness of breath or wheezing.     • ASPIRIN-ACETAMINOPHEN-CAFFEINE ORAL Take 2 tablets by mouth 2 times a day as needed for headaches.     • calcium citrate-vitamin D3 500 mg-12.5 mcg (500 unit) tablet,chewable Chew 1 tablet 2 times a day.     • cyanocobalamin (Vitamin B-12) 100 mcg tablet Take 1 tablet (100 mcg) by mouth once daily in the morning.     • DULoxetine (Cymbalta) 60 mg DR capsule Take 1 capsule (60 mg) by mouth once daily. Do not crush or chew. 90 capsule 3   • minoxidil (Loniten) 2.5 mg tablet Take 1 tablet (2.5 mg) by mouth once daily. 30 tablet 5   • multivit-minerals/folic acid (ADULT ONE DAILY MULTIVITAMIN ORAL) Take by mouth once daily in the morning. As directed     • rOPINIRole (Requip) 0.25 mg tablet Take 1 tablet nightly prior to bed for restless legs; if symptoms not improved can take a second tablet nightly 30 tablet 2   • spironolactone (Aldactone) 50 mg tablet Take 1 tablet (50 mg) by mouth once daily. 30 tablet 5   • tirzepatide, weight loss, (Zepbound) 7.5 mg/0.5 mL injection Inject 7.5 mg under the skin every 7 days. 2 mL 11   • traZODone (Desyrel) 50 mg tablet  Take half to one tablet by mouth about 30 minutes before bedtime as needed for sleep 90 tablet 3   • zolpidem (Ambien) 5 mg tablet Take 1 tablet by mouth about 30 minutes prior to bed; can take a second tablet if does not assist with sleep 60 tablet 0     No current facility-administered medications on file prior to visit.   [2]  Patient Active Problem List  Diagnosis   • Arthritis of left acromioclavicular joint   • Cervical nerve root impingement   • Chronic rhinitis   • DDD (degenerative disc disease), cervical   • Spondylosis without myelopathy or radiculopathy, cervical region   • Depression with anxiety   • Grief   • Intestinal malabsorption   • Hyperparathyroidism (Multi)   • Morbid (severe) obesity due to excess calories (Multi)   • Obstructive sleep apnea syndrome   • Ocular migraine   • Other chronic pain   • Primary osteoarthritis, right shoulder   • Overflow incontinence   • Stress incontinence (female) (male)   • Tinnitus of both ears   • Vitamin D deficiency   • Low back pain   • Osteoarthritis of knee   • Osteoarthritis   • Primary osteoarthritis of right knee   • Psychological stress   • Radial tunnel syndrome   • Restless leg   • Sleep apnea   • Complete tear of right rotator cuff   • Elevated LDL cholesterol level   • Primary insomnia   [3]  Past Surgical History:  Procedure Laterality Date   • BARIATRIC SURGERY     • BUNIONECTOMY     • CHOLECYSTECTOMY     • COLONOSCOPY     • EPIDURAL BLOCK INJECTION  ?   • KNEE CARTILAGE SURGERY Right    • UPPER GASTROINTESTINAL ENDOSCOPY     [4]  Allergies  Allergen Reactions   • House Dust Mite Other     congested, stuffy   • Mold Other     congested, stuffy   • Pollen Extracts Unknown   [5]  Family History  Problem Relation Name Age of Onset   • Heart disease Mother 81    • Coronary artery disease Mother 81    • Other (s/p cabg) Mother 81    • Hypertension Father 81    • Obesity Father 81    • Atrial fibrillation Father 81    • Heart failure Father 81    •  Other (CRF) Father 81    • Arthritis Father 81    • Alcohol abuse Father 81    • Diabetes Father 81    • Other (CVA) Brother 59    • Stroke Brother 59    • Breast cancer Maternal Grandmother Lucihever joseph    • Migraines Maternal Grandmother Luci joseph    • Breast cancer Paternal Grandmother     [6]  Social History  Tobacco Use   • Smoking status: Never     Passive exposure: Never   • Smokeless tobacco: Never   Vaping Use   • Vaping status: Never Used   Substance Use Topics   • Alcohol use: Not Currently     Comment: Has a drink every now and then   • Drug use: Never

## 2025-08-08 PROCEDURE — RXMED WILLOW AMBULATORY MEDICATION CHARGE

## 2025-08-12 ENCOUNTER — PHARMACY VISIT (OUTPATIENT)
Dept: PHARMACY | Facility: CLINIC | Age: 60
End: 2025-08-12
Payer: COMMERCIAL

## 2025-08-14 ENCOUNTER — DOCUMENTATION (OUTPATIENT)
Facility: CLINIC | Age: 60
End: 2025-08-14
Payer: MEDICARE

## 2025-08-15 ENCOUNTER — APPOINTMENT (OUTPATIENT)
Dept: PHARMACY | Facility: HOSPITAL | Age: 60
End: 2025-08-15
Payer: MEDICARE

## 2025-08-15 DIAGNOSIS — E78.00 ELEVATED LDL CHOLESTEROL LEVEL: ICD-10-CM

## 2025-08-15 DIAGNOSIS — E66.813 CLASS 3 SEVERE OBESITY DUE TO EXCESS CALORIES WITH SERIOUS COMORBIDITY AND BODY MASS INDEX (BMI) OF 40.0 TO 44.9 IN ADULT: ICD-10-CM

## 2025-08-15 DIAGNOSIS — R73.03 PREDIABETES: ICD-10-CM

## 2025-08-26 DIAGNOSIS — E78.00 ELEVATED LDL CHOLESTEROL LEVEL: ICD-10-CM

## 2025-08-26 DIAGNOSIS — E66.01 MORBID OBESITY (MULTI): Primary | ICD-10-CM

## 2025-08-26 DIAGNOSIS — R73.03 PREDIABETES: ICD-10-CM

## 2025-08-26 DIAGNOSIS — E66.813 CLASS 3 SEVERE OBESITY DUE TO EXCESS CALORIES WITH SERIOUS COMORBIDITY AND BODY MASS INDEX (BMI) OF 40.0 TO 44.9 IN ADULT: ICD-10-CM

## 2025-08-27 ENCOUNTER — OFFICE VISIT (OUTPATIENT)
Dept: PRIMARY CARE | Facility: CLINIC | Age: 60
End: 2025-08-27
Payer: MEDICARE

## 2025-08-27 VITALS
TEMPERATURE: 96.7 F | DIASTOLIC BLOOD PRESSURE: 78 MMHG | OXYGEN SATURATION: 94 % | HEIGHT: 64 IN | BODY MASS INDEX: 33.12 KG/M2 | WEIGHT: 194 LBS | HEART RATE: 63 BPM | SYSTOLIC BLOOD PRESSURE: 130 MMHG

## 2025-08-27 DIAGNOSIS — E78.00 ELEVATED LDL CHOLESTEROL LEVEL: ICD-10-CM

## 2025-08-27 DIAGNOSIS — E21.3 HYPERPARATHYROIDISM (MULTI): ICD-10-CM

## 2025-08-27 DIAGNOSIS — M17.11 PRIMARY OSTEOARTHRITIS OF RIGHT KNEE: ICD-10-CM

## 2025-08-27 DIAGNOSIS — E55.9 VITAMIN D DEFICIENCY: ICD-10-CM

## 2025-08-27 DIAGNOSIS — R42 DIZZINESS: ICD-10-CM

## 2025-08-27 DIAGNOSIS — Z76.89 ENCOUNTER TO ESTABLISH CARE WITH NEW PROVIDER: Primary | ICD-10-CM

## 2025-08-27 DIAGNOSIS — G89.29 OTHER CHRONIC PAIN: ICD-10-CM

## 2025-08-27 DIAGNOSIS — F41.8 DEPRESSION WITH ANXIETY: ICD-10-CM

## 2025-08-27 DIAGNOSIS — R73.01 IMPAIRED FASTING GLUCOSE: ICD-10-CM

## 2025-08-27 DIAGNOSIS — G47.30 SLEEP APNEA, UNSPECIFIED TYPE: ICD-10-CM

## 2025-08-27 DIAGNOSIS — L65.9 ALOPECIA: ICD-10-CM

## 2025-08-27 DIAGNOSIS — R51.9 HEADACHE DISORDER: ICD-10-CM

## 2025-08-27 DIAGNOSIS — G25.81 RESTLESS LEG: ICD-10-CM

## 2025-08-27 DIAGNOSIS — Z12.31 ENCOUNTER FOR SCREENING MAMMOGRAM FOR MALIGNANT NEOPLASM OF BREAST: ICD-10-CM

## 2025-08-27 DIAGNOSIS — F51.01 PRIMARY INSOMNIA: ICD-10-CM

## 2025-08-27 PROBLEM — M75.121 COMPLETE TEAR OF RIGHT ROTATOR CUFF: Status: RESOLVED | Noted: 2025-03-06 | Resolved: 2025-08-27

## 2025-08-27 PROBLEM — F43.21 GRIEF: Status: RESOLVED | Noted: 2023-11-02 | Resolved: 2025-08-27

## 2025-08-27 PROBLEM — M70.60 GREATER TROCHANTERIC BURSITIS: Status: ACTIVE | Noted: 2025-08-27

## 2025-08-27 PROBLEM — E66.01 MORBID (SEVERE) OBESITY DUE TO EXCESS CALORIES (MULTI): Status: RESOLVED | Noted: 2023-11-02 | Resolved: 2025-08-27

## 2025-08-27 PROCEDURE — 99214 OFFICE O/P EST MOD 30 MIN: CPT | Performed by: NURSE PRACTITIONER

## 2025-08-27 PROCEDURE — 3008F BODY MASS INDEX DOCD: CPT | Performed by: NURSE PRACTITIONER

## 2025-08-27 PROCEDURE — 1036F TOBACCO NON-USER: CPT | Performed by: NURSE PRACTITIONER

## 2025-08-27 RX ORDER — ROPINIROLE 0.25 MG/1
TABLET, FILM COATED ORAL
Qty: 30 TABLET | Refills: 2 | Status: SHIPPED | OUTPATIENT
Start: 2025-08-27

## 2025-08-27 ASSESSMENT — ENCOUNTER SYMPTOMS
LIGHT-HEADEDNESS: 1
CARDIOVASCULAR NEGATIVE: 1
DIZZINESS: 1
CONSTITUTIONAL NEGATIVE: 1
GASTROINTESTINAL NEGATIVE: 1
WEAKNESS: 1
PSYCHIATRIC NEGATIVE: 1
RESPIRATORY NEGATIVE: 1

## 2025-08-27 ASSESSMENT — PATIENT HEALTH QUESTIONNAIRE - PHQ9
2. FEELING DOWN, DEPRESSED OR HOPELESS: NOT AT ALL
1. LITTLE INTEREST OR PLEASURE IN DOING THINGS: NOT AT ALL
SUM OF ALL RESPONSES TO PHQ9 QUESTIONS 1 AND 2: 0

## 2025-08-27 ASSESSMENT — PAIN SCALES - GENERAL: PAINLEVEL_OUTOF10: 7

## 2025-09-16 ENCOUNTER — APPOINTMENT (OUTPATIENT)
Dept: ORTHOPEDIC SURGERY | Facility: CLINIC | Age: 60
End: 2025-09-16
Payer: MEDICARE

## 2025-09-16 ENCOUNTER — APPOINTMENT (OUTPATIENT)
Dept: PHARMACY | Facility: HOSPITAL | Age: 60
End: 2025-09-16
Payer: MEDICARE

## 2025-10-06 ENCOUNTER — APPOINTMENT (OUTPATIENT)
Dept: SLEEP MEDICINE | Facility: CLINIC | Age: 60
End: 2025-10-06
Payer: MEDICARE

## 2025-10-24 ENCOUNTER — APPOINTMENT (OUTPATIENT)
Facility: CLINIC | Age: 60
End: 2025-10-24
Payer: MEDICARE

## 2025-12-17 ENCOUNTER — APPOINTMENT (OUTPATIENT)
Dept: DERMATOLOGY | Facility: CLINIC | Age: 60
End: 2025-12-17
Payer: MEDICARE

## (undated) DEVICE — ARTHREX LATERAL TRACTION ARM SLEEVE (TO BE USED W/AR-1630 OR AR-1600M)

## (undated) DEVICE — DRESSING, ABDOMINAL, WET PRUF, TENDERSORB, 5 X 9 IN, STERILE

## (undated) DEVICE — Device

## (undated) DEVICE — CANNULA, BUTTON, PASSPORT, 8MM X 5CM

## (undated) DEVICE — SUTURE, MONOCRYL, 3-0, 27 IN, PS-2, UNDYED

## (undated) DEVICE — DRAPE, INCISE, ANTIMICROBIAL, IOBAN 2, 13 X 13 IN, DISPOSABLE, STERILE

## (undated) DEVICE — TUBING, PATIENT 8FT STERILE

## (undated) DEVICE — DRAPE, INSTRUMENT, W/POUCH, STERI DRAPE, 7 X 11 IN, DISPOSABLE, STERILE

## (undated) DEVICE — GLOVE, PROTEXIS PI CLASSIC, SZ-6.5, PF, LF

## (undated) DEVICE — BANDAGE, ELASTIC, MATRIX, SELF-CLOSURE, 6 IN X 5 YD, LF

## (undated) DEVICE — BANDAGE, ELASTIC, ACE, SELF-CLOSURE, 6 IN X 5 YD, STERILE

## (undated) DEVICE — GLOVE, SURGICAL, PROTEXIS PI , 7.5, PF, LF

## (undated) DEVICE — NEEDLE, SPINAL, QUINCKE, 18 G X 3.5 IN, PINK HUB

## (undated) DEVICE — TUBING, SUCTION, 6MM X 10, CLEAN N-COND

## (undated) DEVICE — SUTURE, PROLENE, 3-0, 18 IN, PS2, BLUE

## (undated) DEVICE — TUBING, PUMP REDEUCE 8FT STERILE

## (undated) DEVICE — GOWN, SURGICAL, SIRUS, NON REINFORCED, LARGE

## (undated) DEVICE — EXCAL 4MM X 13CM SINGLE

## (undated) DEVICE — GLOVE, SURGICAL, PROTEXIS PI BLUE W/NEUTHERA, 6.5, PF, LF

## (undated) DEVICE — DRESSING, TRANSPARENT, TEGADERM, FRAME STYLE, 4 X 4.5, STRL

## (undated) DEVICE — DRESSING, GAUZE, SPONGE, KERLIX, SUPER, 6 X 6.75 IN, STERILE 10PK

## (undated) DEVICE — CANNULA, BUTTON, PASSPORT, 8MM X 4CM

## (undated) DEVICE — KIT, DISPOSABLES, FOR 2.6 FIBERTAK

## (undated) DEVICE — PROBE, APOLLO RF, 90 DEG, EXTRA LARTGE